# Patient Record
Sex: FEMALE | Race: BLACK OR AFRICAN AMERICAN | NOT HISPANIC OR LATINO | Employment: UNEMPLOYED | URBAN - METROPOLITAN AREA
[De-identification: names, ages, dates, MRNs, and addresses within clinical notes are randomized per-mention and may not be internally consistent; named-entity substitution may affect disease eponyms.]

---

## 2017-01-10 RX ORDER — ERGOCALCIFEROL 1.25 MG/1
50000 CAPSULE ORAL
Qty: 12 CAPSULE | Refills: 1 | Status: SHIPPED | OUTPATIENT
Start: 2017-01-10 | End: 2017-12-11 | Stop reason: SDUPTHER

## 2017-03-28 DIAGNOSIS — I10 ESSENTIAL HYPERTENSION: ICD-10-CM

## 2017-03-28 RX ORDER — ATENOLOL AND CHLORTHALIDONE TABLET 100; 25 MG/1; MG/1
1 TABLET ORAL DAILY
Qty: 90 TABLET | Refills: 1 | Status: SHIPPED | OUTPATIENT
Start: 2017-03-28 | End: 2017-12-11 | Stop reason: SDUPTHER

## 2017-09-07 DIAGNOSIS — I10 ESSENTIAL HYPERTENSION: ICD-10-CM

## 2017-09-07 RX ORDER — BENAZEPRIL HYDROCHLORIDE 20 MG/1
TABLET ORAL
Qty: 90 TABLET | Refills: 1 | Status: SHIPPED | OUTPATIENT
Start: 2017-09-07 | End: 2017-12-11 | Stop reason: SDUPTHER

## 2017-09-20 DIAGNOSIS — Z85.850 HISTORY OF PAPILLARY ADENOCARCINOMA OF THYROID: ICD-10-CM

## 2017-09-20 RX ORDER — LEVOTHYROXINE SODIUM 125 UG/1
TABLET ORAL
Qty: 90 TABLET | Refills: 2 | Status: SHIPPED | OUTPATIENT
Start: 2017-09-20 | End: 2018-07-25

## 2017-09-24 DIAGNOSIS — E78.5 HYPERLIPIDEMIA, UNSPECIFIED HYPERLIPIDEMIA TYPE: ICD-10-CM

## 2017-09-26 RX ORDER — PRAVASTATIN SODIUM 20 MG/1
20 TABLET ORAL DAILY
Qty: 90 TABLET | Refills: 1 | Status: SHIPPED | OUTPATIENT
Start: 2017-09-26 | End: 2019-01-22 | Stop reason: SDUPTHER

## 2017-12-05 RX ORDER — ERGOCALCIFEROL 1.25 MG/1
50000 CAPSULE ORAL
Qty: 12 CAPSULE | Refills: 1 | OUTPATIENT
Start: 2017-12-05

## 2017-12-11 DIAGNOSIS — E11.65 UNCONTROLLED TYPE 2 DIABETES MELLITUS WITH HYPERGLYCEMIA, WITHOUT LONG-TERM CURRENT USE OF INSULIN: ICD-10-CM

## 2017-12-11 DIAGNOSIS — I10 ESSENTIAL HYPERTENSION: ICD-10-CM

## 2017-12-11 NOTE — TELEPHONE ENCOUNTER
----- Message from Evie Singh sent at 12/11/2017  3:05 PM CST -----  Contact: Self [phone]  If needed, pls add orders and schedule labs at main campus on 4/3.   pls mail appointment slip.      ..Refill request.  ergocalciferol (ERGOCALCIFEROL) 50,000 unit Cap     ..  [facility]/pharmacy #[id number] - [address] AT COR[address] OF [address] [address]  Phone: [phone] Fax: [phone]

## 2017-12-11 NOTE — TELEPHONE ENCOUNTER
----- Message from Katrin Summers sent at 12/11/2017  3:17 PM CST -----  Contact: Pt   [phone]   Rd  -   Pt calling to get an refill on medication  atenolol-chlorthalidone  ,   benazepril  ,  metformin  , to last until her next mel .  Call the pt back when filled

## 2017-12-12 RX ORDER — ERGOCALCIFEROL 1.25 MG/1
50000 CAPSULE ORAL
Qty: 12 CAPSULE | Refills: 1 | Status: SHIPPED | OUTPATIENT
Start: 2017-12-12 | End: 2022-06-01

## 2017-12-12 RX ORDER — METFORMIN HYDROCHLORIDE 500 MG/1
1000 TABLET, EXTENDED RELEASE ORAL 2 TIMES DAILY
Qty: 180 TABLET | Refills: 2 | Status: SHIPPED | OUTPATIENT
Start: 2017-12-12 | End: 2018-08-07 | Stop reason: SDUPTHER

## 2017-12-12 RX ORDER — BENAZEPRIL HYDROCHLORIDE 20 MG/1
20 TABLET ORAL DAILY
Qty: 90 TABLET | Refills: 1 | Status: SHIPPED | OUTPATIENT
Start: 2017-12-12 | End: 2019-01-18 | Stop reason: SDUPTHER

## 2017-12-12 RX ORDER — ATENOLOL AND CHLORTHALIDONE TABLET 100; 25 MG/1; MG/1
1 TABLET ORAL DAILY
Qty: 90 TABLET | Refills: 1 | Status: SHIPPED | OUTPATIENT
Start: 2017-12-12 | End: 2018-11-30

## 2018-04-05 ENCOUNTER — TELEPHONE (OUTPATIENT)
Dept: ENDOCRINOLOGY | Facility: CLINIC | Age: 58
End: 2018-04-05

## 2018-04-05 NOTE — TELEPHONE ENCOUNTER
----- Message from Cosmo Dozier sent at 4/5/2018 10:20 AM CDT -----  Contact: self  Pt called in about having rs appt. Pt next appt is 8/7/18. Pt wanted to see if will get medication up until that point. Pt would like the nurse to give her a call back in regards to this matter      Pt can be reached at [phone]    TY

## 2018-07-18 DIAGNOSIS — E78.5 HYPERLIPIDEMIA, UNSPECIFIED HYPERLIPIDEMIA TYPE: ICD-10-CM

## 2018-07-18 RX ORDER — PRAVASTATIN SODIUM 20 MG/1
20 TABLET ORAL DAILY
Qty: 90 TABLET | Refills: 1 | Status: CANCELLED | OUTPATIENT
Start: 2018-07-18

## 2018-07-24 RX ORDER — LEVOTHYROXINE SODIUM 125 UG/1
TABLET ORAL
Qty: 30 TABLET | Refills: 0 | OUTPATIENT
Start: 2018-07-24

## 2018-07-25 DIAGNOSIS — Z85.850 HISTORY OF PAPILLARY ADENOCARCINOMA OF THYROID: ICD-10-CM

## 2018-07-25 RX ORDER — LEVOTHYROXINE SODIUM 125 UG/1
TABLET ORAL
Qty: 90 TABLET | Refills: 1 | Status: SHIPPED | OUTPATIENT
Start: 2018-07-25 | End: 2018-08-07 | Stop reason: SDUPTHER

## 2018-07-25 NOTE — TELEPHONE ENCOUNTER
----- Message from Evie Singh sent at 7/25/2018 11:38 AM CDT -----  Contact: Self [phone]  PT is requesting orders for labs to be done prior to appointment on 8/7.    pls schedule August 1 at [facility].    ..Checking on the status of refill request.  SYNTHROID 125 mcg tablet    ..  [facility]/pharmacy #[id number] - [address] AT CORNER OF [address] [address]  Phone: [phone] Fax: [phone]

## 2018-08-02 ENCOUNTER — TELEPHONE (OUTPATIENT)
Dept: ENDOCRINOLOGY | Facility: CLINIC | Age: 58
End: 2018-08-02

## 2018-08-02 DIAGNOSIS — E03.9 HYPOTHYROIDISM, UNSPECIFIED TYPE: ICD-10-CM

## 2018-08-02 DIAGNOSIS — E11.8 TYPE 2 DIABETES MELLITUS WITH COMPLICATION, UNSPECIFIED WHETHER LONG TERM INSULIN USE: Primary | ICD-10-CM

## 2018-08-02 NOTE — TELEPHONE ENCOUNTER
----- Message from Bishnu Summers sent at 8/1/2018  5:23 PM CDT -----  Contact: Pt  Pt is requesting orders for labs. Has appt with Dr pugh for 08/07/18. Needs labs orderedand lexy prior to dr smith    Pt can be reached at [phone]

## 2018-08-02 NOTE — TELEPHONE ENCOUNTER
Attempted to call pt but pt phone has  voice mail  not set up,  so called another  contact no for Shelly Melchor and left brief voice mail for pt lab appointment set up for tomorrow. Contact no was provided.

## 2018-08-03 ENCOUNTER — APPOINTMENT (OUTPATIENT)
Dept: LAB | Facility: HOSPITAL | Age: 58
End: 2018-08-03
Attending: INTERNAL MEDICINE
Payer: COMMERCIAL

## 2018-08-06 NOTE — PROGRESS NOTES
"Subjective:       Patient ID: Rand Kaye is a 57 y.o. female.    Chief Complaint: Follow-up    HPI     53 y/o F here for follow-up for DM and hx of thyroid cancer.  Last seen by Trenton Stephens and Rd 9/2016     Has T2DM Since 2009. A1c has increased to 10.1% (from 9.3%). Doesn't check glucose at home. Has been noncompliant with meds and diet. Uses sugar, drinking sodas. Uses metformin sparingly. Never filled invokana from last visit.  Is gaining weight. Is a , so lifestyle is sedentary - insulin is last option. Overdue for eye check. Denies peripheral neuropathy.     Last seen by DME: 4/2013    For thyroid cancer, had surgery in 2012 to remove thyroid 2/2 compressive symptoms. Incidentally found to have a 1 cm papillary thyroid cancer in left lobe after surgery. Received 30 mCi iodine after surgery, but did not show for WBS. Started on synthroid, taking 125mcg/day. No hyper or hypothyroid symptoms. Last TSH normal. Thyroglobulin previously 8.8, but most recent 2.7.       Was then noted to have a left neck mass on exam that pt states is unchanged since 2013. Had U/S neck 2/2013:  "Sonographic examination of neck compartments II through V was carried out.  No pathologic lymphadenopathy identified.  In the lateral compartment III there is an asymmetric thickening of the muscle which may represent a mass. There is no indication that this is arising from the lymph node."     then underwent FNA that was "almost acellular, no colloid, unsatisfactory"      Has HTN, compliant with meds including benazepril      Has HLD, on statin     Is obese, BMI 33, admits to not exercising and not following diet     Review of Systems   Constitutional: Negative for unexpected weight change.   Eyes: Negative for visual disturbance.   Respiratory: Negative for shortness of breath.    Cardiovascular: Negative for chest pain.   Gastrointestinal: Negative for abdominal pain.   Genitourinary: Negative for urgency. " "  Musculoskeletal: Negative for arthralgias.   Skin: Negative for wound.   Neurological: Negative for headaches.   Hematological: Does not bruise/bleed easily.   Psychiatric/Behavioral: Negative for sleep disturbance.       Objective:       BP (!) 150/84 (BP Location: Left arm, Patient Position: Sitting, BP Method: Medium (Manual))   Pulse (!) 54   Ht 5' 10" (1.778 m)   Wt 105.7 kg (233 lb 0.4 oz)   LMP 09/18/2013   BMI 33.44 kg/m²     Physical Exam   Constitutional: She appears well-developed and well-nourished. No distress.   HENT:   Head: Normocephalic and atraumatic.   Neck: No thyromegaly present.   +L neck mass ~3-4cm, mobile, non tender, not warm   Cardiovascular: Normal rate.    Pulmonary/Chest: Effort normal and breath sounds normal.   Abdominal: Soft.   Musculoskeletal: She exhibits no edema.   Feet no cuts or scratches  Shoes appropriate    Neurological:   sensation intact to vibration and monofilament    Skin:   No nodules  injection sites are ok. No lipo hypertrophy or atrophy    Vitals reviewed.        Protective Sensation (w/ 10 gram monofilament):  Right: Intact  Left: Intact    Visual Inspection:  Normal -  Bilateral    Pedal Pulses:   Right: Present  Left: Present    Posterior tibialis:   Right:Present  Left: Present        Lab Results   Component Value Date    HGBA1C 10.1 (H) 08/03/2018    HGBA1C 9.3 (H) 09/10/2016    LABMICR 89.0 09/13/2016    MICALBCREAT 94.7 (H) 09/13/2016    CHOL 222 (H) 08/03/2018    CHOL 206 (H) 07/30/2015    TRIG 191 (H) 08/03/2018    TRIG 113 07/30/2015    HDL 66 08/03/2018    HDL 56 07/30/2015    LDLCALC 117.8 08/03/2018    LDLCALC 127.4 07/30/2015         Chemistry        Component Value Date/Time     08/03/2018 1620    K 3.3 (L) 08/03/2018 1620     08/03/2018 1620    CO2 28 08/03/2018 1620    BUN 12 08/03/2018 1620    CREATININE 0.8 08/03/2018 1620     (H) 08/03/2018 1620        Component Value Date/Time    CALCIUM 10.0 08/03/2018 1620    STEPHAN " 59 2018 1620    AST 22 2018 1620    ALT 24 2018 1620    BILITOT 0.3 2018 1620    ESTGFRAFRICA >60.0 2018 1620    EGFRNONAA >60.0 2018 1620          TSH   Date Value Ref Range Status   2018 0.814 0.400 - 4.000 uIU/mL Final     Free T4   Date Value Ref Range Status   2018 1.08 0.71 - 1.51 ng/dL Final     Thyroglobulin Antibody Screen   Date Value Ref Range Status   2018 <1.8 <4.0 IU/mL Final     Thyroglobulin   Date Value Ref Range Status   10/18/2013 6.6 2.0 - 40.0 ng/mL Final     Thyroperoxidase Antibodies   Date Value Ref Range Status   2012 < 6.0 0 - 6 IU/mL Final     T.7 (2018)    Assessment:       1. History of papillary adenocarcinoma of thyroid    2. Uncontrolled type 2 diabetes mellitus with hyperglycemia, without long-term current use of insulin    3. Postoperative hypothyroidism    4. Class 1 obesity due to excess calories with serious comorbidity and body mass index (BMI) of 34.0 to 34.9 in adult    5. Hyperlipidemia, unspecified hyperlipidemia type    6. Essential hypertension        Plan:       History of papillary adenocarcinoma of thyroid  Got 30mCi s/p surgery but never showed for WBS   This left neck mass in concerning, will recommend repeat U/S for comparison since .    May require further imaging to evaluate soft tissue, ie CT neck. Will await US results prior to ordering.  TG improved from previous.      Biochemically and clinically euthyroid, continue LT4 125mcg.      Uncontrolled type 2 diabetes mellitus with hyperglycemia, without long-term current use of insulin  Had extensive discussion about dietary lifestyle changes.  Patient v/u.   Referred to DM education for further education on diet, new medication, and possibility of CGM.  She is motivated to start managing her DM, as she wants to avoid end organ complications.   Discussed increased daily activity to assist with weight loss.   Given a1c and increasing trend, prefer  to start insulin, but patient's occupation prevents that at this time.  To restart metformin (500mg XL BID), start Jardiance (counseled on potential SE of yeast infections, dehydration, and UTI.  Counseled on positive effects of weight loss.).  Will likely benefit from GLP1 RA in near future.  Start checking BS at least 3 times daily, but preferably 4x daily.  Provided BG log. Patient to send in completed sheet for further titration of regimen.    Postoperative hypothyroidism  As above     Obesity due to excess calories, unspecified obesity severity  Counseled on wt loss and exercise   Avoid white carbs, sodas, snacking, late meals  Started SGLT2i, which will assist with weight loss. Recommended compliance with metformin.     Hyperlipidemia, unspecified hyperlipidemia type  Reviewed lipid panel with patient.  Continue statin.    Essential hypertension  Hypertensive at triage, but did not take medications today.  Usually controlled on ACEi.  Continue benazepril 20mg and tenoric.         RTC in 6 months for DM follow up, and again in 1y for thyroid FU  Patient to send in log for further DM regimen adjustment if necessary.      Madai Otero MD  Endocrinology Fellow     This case has been reviewed with staff, Dr. Brooks.

## 2018-08-07 ENCOUNTER — OFFICE VISIT (OUTPATIENT)
Dept: ENDOCRINOLOGY | Facility: CLINIC | Age: 58
End: 2018-08-07
Payer: COMMERCIAL

## 2018-08-07 VITALS
HEIGHT: 70 IN | SYSTOLIC BLOOD PRESSURE: 150 MMHG | BODY MASS INDEX: 33.36 KG/M2 | DIASTOLIC BLOOD PRESSURE: 84 MMHG | WEIGHT: 233 LBS | HEART RATE: 54 BPM

## 2018-08-07 DIAGNOSIS — E78.5 HYPERLIPIDEMIA, UNSPECIFIED HYPERLIPIDEMIA TYPE: ICD-10-CM

## 2018-08-07 DIAGNOSIS — E89.0 POSTOPERATIVE HYPOTHYROIDISM: ICD-10-CM

## 2018-08-07 DIAGNOSIS — E66.09 CLASS 1 OBESITY DUE TO EXCESS CALORIES WITH SERIOUS COMORBIDITY AND BODY MASS INDEX (BMI) OF 34.0 TO 34.9 IN ADULT: ICD-10-CM

## 2018-08-07 DIAGNOSIS — Z85.850 HISTORY OF PAPILLARY ADENOCARCINOMA OF THYROID: Primary | ICD-10-CM

## 2018-08-07 DIAGNOSIS — I10 ESSENTIAL HYPERTENSION: ICD-10-CM

## 2018-08-07 DIAGNOSIS — E11.65 UNCONTROLLED TYPE 2 DIABETES MELLITUS WITH HYPERGLYCEMIA, WITHOUT LONG-TERM CURRENT USE OF INSULIN: ICD-10-CM

## 2018-08-07 PROCEDURE — 99214 OFFICE O/P EST MOD 30 MIN: CPT | Mod: S$GLB,,, | Performed by: INTERNAL MEDICINE

## 2018-08-07 PROCEDURE — 3046F HEMOGLOBIN A1C LEVEL >9.0%: CPT | Mod: CPTII,S$GLB,, | Performed by: INTERNAL MEDICINE

## 2018-08-07 PROCEDURE — 3008F BODY MASS INDEX DOCD: CPT | Mod: CPTII,S$GLB,, | Performed by: INTERNAL MEDICINE

## 2018-08-07 PROCEDURE — 3077F SYST BP >= 140 MM HG: CPT | Mod: CPTII,S$GLB,, | Performed by: INTERNAL MEDICINE

## 2018-08-07 PROCEDURE — 3079F DIAST BP 80-89 MM HG: CPT | Mod: CPTII,S$GLB,, | Performed by: INTERNAL MEDICINE

## 2018-08-07 PROCEDURE — 99999 PR PBB SHADOW E&M-EST. PATIENT-LVL V: CPT | Mod: PBBFAC,,, | Performed by: INTERNAL MEDICINE

## 2018-08-07 RX ORDER — INSULIN PUMP SYRINGE, 3 ML
EACH MISCELLANEOUS
Qty: 1 EACH | Refills: 0 | Status: SHIPPED | OUTPATIENT
Start: 2018-08-07 | End: 2019-08-07

## 2018-08-07 RX ORDER — METFORMIN HYDROCHLORIDE 500 MG/1
1000 TABLET, EXTENDED RELEASE ORAL 2 TIMES DAILY
Qty: 180 TABLET | Refills: 2 | Status: SHIPPED | OUTPATIENT
Start: 2018-08-07 | End: 2023-04-26 | Stop reason: SDUPTHER

## 2018-08-07 RX ORDER — LANCETS
EACH MISCELLANEOUS
Qty: 200 EACH | Refills: 6 | Status: SHIPPED | OUTPATIENT
Start: 2018-08-07

## 2018-08-07 RX ORDER — ERGOCALCIFEROL 1.25 MG/1
50000 CAPSULE ORAL
Qty: 12 CAPSULE | Refills: 1 | Status: CANCELLED | OUTPATIENT
Start: 2018-08-07

## 2018-08-07 RX ORDER — LEVOTHYROXINE SODIUM 125 UG/1
TABLET ORAL
Qty: 90 TABLET | Refills: 1 | Status: SHIPPED | OUTPATIENT
Start: 2018-08-07 | End: 2022-02-25

## 2018-08-07 RX ORDER — BETAMETHASONE DIPROPIONATE 0.5 MG/G
OINTMENT TOPICAL 2 TIMES DAILY
Qty: 45 G | Refills: 0 | Status: SHIPPED | OUTPATIENT
Start: 2018-08-07 | End: 2022-04-27 | Stop reason: SDUPTHER

## 2018-08-07 NOTE — PATIENT INSTRUCTIONS
Vitamin D3 1000 iU daily    Referred to Diabetes education    Started new medication - Jardiance.    Discussed side effects - dehydration, yeast infection, UTIs, etc.     Scheduled US.

## 2018-08-21 ENCOUNTER — CLINICAL SUPPORT (OUTPATIENT)
Dept: DIABETES | Facility: CLINIC | Age: 58
End: 2018-08-21
Payer: COMMERCIAL

## 2018-08-21 ENCOUNTER — HOSPITAL ENCOUNTER (OUTPATIENT)
Dept: RADIOLOGY | Facility: HOSPITAL | Age: 58
Discharge: HOME | End: 2018-08-21
Attending: INTERNAL MEDICINE
Payer: COMMERCIAL

## 2018-08-21 ENCOUNTER — CLINICAL SUPPORT (OUTPATIENT)
Dept: OPTOMETRY | Facility: CLINIC | Age: 58
End: 2018-08-21
Attending: INTERNAL MEDICINE
Payer: COMMERCIAL

## 2018-08-21 DIAGNOSIS — E11.65 UNCONTROLLED TYPE 2 DIABETES MELLITUS WITH HYPERGLYCEMIA, WITHOUT LONG-TERM CURRENT USE OF INSULIN: ICD-10-CM

## 2018-08-21 DIAGNOSIS — Z85.850 HISTORY OF PAPILLARY ADENOCARCINOMA OF THYROID: ICD-10-CM

## 2018-08-21 PROCEDURE — 76536 US EXAM OF HEAD AND NECK: CPT | Mod: 26,,, | Performed by: RADIOLOGY

## 2018-08-21 PROCEDURE — 92250 FUNDUS PHOTOGRAPHY W/I&R: CPT | Mod: S$GLB,,, | Performed by: OPHTHALMOLOGY

## 2018-08-21 PROCEDURE — G0108 DIAB MANAGE TRN  PER INDIV: HCPCS | Mod: S$GLB,,, | Performed by: INTERNAL MEDICINE

## 2018-08-21 PROCEDURE — 76536 US EXAM OF HEAD AND NECK: CPT | Mod: TC

## 2018-08-21 NOTE — PROGRESS NOTES
"Diabetes Education  Author: Traci Cruz RD, CDE  Date: 8/21/2018    Diabetes Education Visit  Diabetes Education Record Assessment/Progress: Initial    Diabetes Type  Diabetes Type : Type II    Diabetes History  Diabetes Diagnosis: >10 years    Diagnosed with type 2 DM ~ 2010. At the time attended a group diabetes education class.      18 [address] .      hasnt been able to afford picking up rx of jardiance yet. Was previously taking metformin 500 mg once a day, has rx to increase this to 500 mg 2 tabs BID.      Until January was sleeping in 18 hinojosa but since January now sleeping at home.      Sedentary lifestyle. Currently drinking "a lot of water." Stopped sugar sweetened beverages last week -- was drinking 2 a day (sweet tea in bottle). Also cut out chips, ice cream.      3 meals/day. bfast is eaten at home (sandwich), lunch she brings leftovers with her and eats both lunch and evening meal on road.      Lives alone. 3 kids, 4 granddaughters     Not SMBG-has not been a priority. has current rx for meter supplies. Goal will be to begin SMBG.    Monitoring   Blood Glucose Logs: No  Do you use a personal glucose monitor?: No  In the last month, how often have you had a low blood sugar reaction?: never  Can you tell when your blood sugar is too high?: no    Exercise   Exercise Type: none    Current Diabetes Treatment   Current Treatment: Oral Medication    Social History  Preferred Learning Method: Face to Face  Primary Support: Self    Barriers to Change  Barriers to Change: None  Learning Challenges : None    Readiness to Learn   Readiness to Learn : Eager    Cultural Influences  Cultural Influences: No    Diabetes Education Assessment/Progress  Diabetes Disease Process (diabetes disease process and treatment options): Discussion, Instructed, Individual Session  Nutrition (Incorporating nutritional management into one's lifestyle): Discussion, Individual Session  Physical Activity (incorporating " physical activity into one's lifestyle): Discussion, Individual Session  Medications (states correct name, dose, onset, peak, duration, side effects & timing of meds): Discussion, Individual Session  Monitoring (monitoring blood glucose/other parameters & using results): Discussion, Individual Session  Acute Complications (preventing, detecting, and treating acute complications): Discussion  Chronic Complications (preventing, detecting, and treating chronic complications): Discussion  Clinical (diabetes, other pertinent medical history, and relevant comorbidities reviewed during visit): Discussion  Cognitive (knowledge of self-management skills, functional health literacy): Discussion  Psychosocial (emotional response to diabetes): Discussion  Diabetes Distress and Support Systems: Discussion  Behavioral (readiness for change, lifestyle practices, self-care behaviors): Discussion    Goals  Patient has selected/evaluated goals during today's session: Yes, selected  Monitoring: Set(SMBG 2x/day )  Start Date: 08/21/18  Target Date: 09/21/18    Diabetes Care Plan/Intervention  Education Plan/Intervention: Individual Follow-Up DSMT    Diabetes Meal Plan  Carbohydrate Per Meal: 30-45g  Carbohydrate Per Snack : 7-15g    Education Units of Time   Time Spent: 60 min    Health Maintenance was reviewed today with patient. Discussed with patient importance of routine eye exams, foot exams/foot care, blood work (i.e.: A1c, microalbumin, and lipid), dental visits, yearly flu vaccine, and pneumonia vaccine as indicated by PCP. Patient verbalized understanding.     Health Maintenance Topics with due status: Not Due       Topic Last Completion Date    Lipid Panel 08/03/2018    Hemoglobin A1c 08/03/2018    Low Dose Statin 08/07/2018    Foot Exam 08/07/2018    Mammogram 08/07/2018     Health Maintenance Due   Topic Date Due    Hepatitis C Screening  1960    Eye Exam  09/22/1969    TETANUS VACCINE  09/22/1977    Pneumococcal  PPSV23 (Medium Risk) (1) 09/22/1977    Colonoscopy  09/22/2009    Influenza Vaccine  08/01/2018

## 2018-08-21 NOTE — PROGRESS NOTES
HPI     Diabetic Eye Exam      Additional comments: Photos              Comments     57 y.o. y/o here for screening for Diabetic Renopathy with non-dilated   fundus photos per Uli Sol Jr, MD            Last edited by Madeleine Harvey MA on 8/21/2018  1:18 PM. (History)            Assessment /Plan     For exam results, see Encounter Report.    Uncontrolled type 2 diabetes mellitus with hyperglycemia, without long-term current use of insulin  -     Diabetic Eye Screening Photo      Patient had small pupils.

## 2018-08-21 NOTE — PROGRESS NOTES
"Diagnosed with type 2 DM ~ 2010. At the time attended a group diabetes education class.     18 [address] .     hasnt been able to afford picking up rx of jardiance yet. Was previously taking metformin 500 mg once a day, has rx to increase this to 500 mg 2 tabs BID.     Until January was sleeping in 18 hinojosa but since January now sleeping at home.     Sedentary lifestyle. Currently drinking "a lot of water." Stopped sugar sweetened beverages last week -- was drinking 2 a day (sweet tea in bottle). Also cut out chips, ice cream.     3 meals/day. bfast is eaten at home (sandwich), lunch she brings leftovers with her and eats both lunch and evening meal on road.     Lives alone. 3 kids, 4 granddaughters    Not SMBG.   "

## 2018-08-22 DIAGNOSIS — R22.1 NECK MASS: ICD-10-CM

## 2018-09-24 ENCOUNTER — HOSPITAL ENCOUNTER (OUTPATIENT)
Dept: RADIOLOGY | Facility: HOSPITAL | Age: 58
Discharge: HOME | End: 2018-09-24
Attending: INTERNAL MEDICINE
Payer: COMMERCIAL

## 2018-09-24 ENCOUNTER — OFFICE VISIT (OUTPATIENT)
Dept: OPTOMETRY | Facility: CLINIC | Age: 58
End: 2018-09-24
Payer: COMMERCIAL

## 2018-09-24 DIAGNOSIS — H25.13 NUCLEAR SCLEROTIC CATARACT OF BOTH EYES: ICD-10-CM

## 2018-09-24 DIAGNOSIS — Z79.84 LONG TERM CURRENT USE OF ORAL HYPOGLYCEMIC DRUG: ICD-10-CM

## 2018-09-24 DIAGNOSIS — R22.1 NECK MASS: ICD-10-CM

## 2018-09-24 DIAGNOSIS — E11.9 TYPE 2 DIABETES MELLITUS WITHOUT RETINOPATHY: Primary | ICD-10-CM

## 2018-09-24 LAB
CREAT SERPL-MCNC: 0.7 MG/DL (ref 0.5–1.4)
SAMPLE: NORMAL

## 2018-09-24 PROCEDURE — 70491 CT SOFT TISSUE NECK W/DYE: CPT | Mod: TC

## 2018-09-24 PROCEDURE — 25500020 PHARM REV CODE 255: Performed by: INTERNAL MEDICINE

## 2018-09-24 PROCEDURE — 92004 COMPRE OPH EXAM NEW PT 1/>: CPT | Mod: S$GLB,,, | Performed by: OPTOMETRIST

## 2018-09-24 PROCEDURE — 70491 CT SOFT TISSUE NECK W/DYE: CPT | Mod: 26,,, | Performed by: RADIOLOGY

## 2018-09-24 PROCEDURE — 99999 PR PBB SHADOW E&M-EST. PATIENT-LVL III: CPT | Mod: PBBFAC,,, | Performed by: OPTOMETRIST

## 2018-09-24 RX ADMIN — IOHEXOL 75 ML: 350 INJECTION, SOLUTION INTRAVENOUS at 03:09

## 2018-09-24 NOTE — PATIENT INSTRUCTIONS
CATARACT    Symptoms and Signs:  A cataract starts out small, and at first has little effect on your vision. You may notice that your vision is blurred a little, like looking through a cloudy piece of glass or viewing an impressionist painting. A cataract may make light from the sun or a lamp seem too bright or glaring. Or you may notice when you drive at night that the oncoming headlights cause more glare than before. Colors may not appear as bright as they once did.  The type of cataract you have will affect exactly which symptoms you experience and how soon they will occur. When a nuclear cataract first develops it can bring about a temporary improvement in your near vision, called second sight. Unfortunately, the improved vision is short-lived and will disappear as the cataract worsens. Meanwhile, a sub-capsular cataract may not produce any symptoms until it's well-developed.    Causes:  No one knows for sure why the eye's lens changes as we age, forming cataracts. Researchers are gradually identifying factors that may cause cataracts - and information that may help to prevent them.  Many studies suggest that exposure to ultraviolet light is associated with cataracts, so eye care practitioners recommend wearing sunglasses and a wide-brimmed hat to lessen your exposure.  Other studies suggest people with diabetes are at risk for developing a cataract.   Some eye care practitioners believe that a diet high in antioxidants, such as beta-carotene (vitamin A), selenium and vitamins C and E, may forestall cataracts.  The most important of these is probably vitamin C; it might be helpful to supplement the diet with an extra Vitamin C tablet.  Meanwhile, eating a lot of salt may increase your risk.  Other risk factors include cigarette smoke, air pollution and heavy alcohol consumption.  We simply recommend that you be careful to use sunglasses and to take Vitamin C.    Treatment:  When symptoms begin to appear, we can  improve your vision for a while using new glasses, strong bifocals, magnification, appropriate lighting or other visual aids.  This is true in your case; your cataract does not impact your vision very much at this time. If you experience any of the symptoms we described you can return at any time. Otherwise it is fine to see you in 1 year.     ==============================================      Diabetes: Meal Planning  You can help keep your blood sugar level in your target range by eating healthy foods. Your healthcare team can help you create a low-fat, nutritious meal plan. Take an active role in your diabetes management by following your meal plan and working with your healthcare team.    Make your meal plan  A meal plan gives guidelines for the types and amounts of food you should eat. The goal is to balance food and insulin (or other diabetes medications) so your blood sugars will be in your target range. Your dietitian will help you make a flexible meal plan that includes many foods that you like.  Watch serving sizes  Your meal plan will group foods by servings. To learn how much a serving is, start by measuring food portions at each meal. Soon youll know what a serving looks like on your plate. Ask your healthcare provider about how to balance servings of different foods.  Eat from all the food groups  The basis of a healthy meal plan is variety (eating lots of different foods). Choose lean meats, fresh fruits and vegetables, whole grains, and low-fat or nonfat dairy products. Eating a wide variety of foods provides the nutrients your body needs. It can also keep you from getting bored with your meal plan.  Learn about carbohydrates, fats, and protein  · Carbohydrates are starches, sugars, and fiber. They are found in many foods, including fruit, bread, pasta, milk, and sweets. Of all the foods you eat, carbohydrates have the most effect on your blood sugar. Your dietitian may teach you about carb  counting, a way to figure out the number of carbohydrates in a meal.  · Fats have the most calories. They also have the most effect on your weight and your risk of heart disease. When you have diabetes, its important to control your weight and protect your heart. Foods that are high in fat include whole milk, cheese, snack foods, and desserts.  · Protein is important for building and repairing muscles and bones. Choose low-fat protein sources, such as fish, egg whites, and skinless chicken.  Reduce liquid sugars  Extra calories from sodas, sports drinks, and fruit drinks make it hard to keep blood sugar in range. Cut as many liquid sugars from your meal plan as you can.  This includes most fruit juices, which are often high in natural or added sugar. Instead, drink plenty of water and other sugar-free beverages.  Eat less fat  If you need to lose weight, try to reduce the amount of fat in your diet. This can also help lower your cholesterol level to keep blood vessels healthier. Cut fat by using only small amounts of liquid oil for cooking. Read food labels carefully to avoid foods with unhealthy trans fats.  Timing your meals  When it comes to blood sugar control, when you eat is as important as what you eat. You may need to eat several small meals spaced evenly throughout the day to stay in your target range. So dont skip breakfast or wait until late in the day to get most of your calories. Doing so can cause your blood sugar to rise too high or fall too low.    © 3114-0618 [employer]. [address] [place] [address], [place] [address]. All rights reserved. This information is not intended as a substitute for professional medical care. Always follow your healthcare professional's instructions.           Diabetes: Shopping for and Preparing Meals  Having diabetes doesnt mean you have to shop in a special aisle or look for special foods. But you will need to make choices. By comparing items and reading food  "labels, you can find the healthiest foods for you and your family.  Comparing items    When you shop, compare items to find the best ones for your needs. Keep these facts in mind:  · No sugar added does not mean a product is sugar-free.  · "Sugar-free" means less than 1/2 gram (g) of sugar per serving.  · Fat free means less than 1/2 g of fat per serving. This does not necessarily mean the product is low in calories.  · Low fat means 3 g fat or less per serving. Reduced fat or less fat means 25% less fat than the regular version. Some of this fat may be saturated or trans fat. And calories per serving may be similar to the regular version.  Making small changes  Dont try to change all of your eating habits at once. Here are some ideas to start with:  · Try fat-free or low-fat cheese, milk, and yogurt. Also try leaner cuts of meat. This will help you cut down on saturated fat.  · Try whole-grain breads, brown rice, and whole-wheat pasta.  · Load up on fresh or frozen vegetables. If you buy canned, choose low-sodium vegetables.  · Avoid processed foods as much as possible. They tend to be low in fiber and high in trans fats and sodium.  · Try tofu, soymilk, or meat substitutes.?They can help you cut cholesterol and saturated fat out of your diet.  Learning to read food labels  To find healthy foods that help you control blood sugar, learn how to read food labels. Look for the Nutrition Facts label on packaged foods. It will tell you how much carbohydrate, sugar, fat, and fiber is in each serving. Then, you can decide whether or not the food fits into your meal plan.  Using the food label  So, once you have the food label, what do you do with it? The food label helps in many ways. Use it to:  · Compare items and decide which is the best for your health needs.  · Track the number of carbohydrates in your portions.  · Figure out how many servings of a food you can have and still stay within the number of " carbohydrates for that meal.  Planning meals  For good blood sugar control, plan what and when youll eat. Start by creating a meal plan that includes all the food groups. Then, time your meals to help keep your blood sugar level steady. You may need to adjust your plan for special situations.  Eat from all the food groups  The basis of a healthy meal plan is variety (eating many different types of foods). Look for lean meats, fresh fruits and vegetables, whole grains, and low-fat or non-fat dairy products. Eating a wide variety of foods provides the nutrients your body needs. It can also keep you from getting bored with your meal plan.  Reduce liquid sugars  Extra calories from sodas, sports drinks, and fruit drinks make it hard to keep blood sugar in range. Cut as many liquid sugars from your meal plan as you can. This includes most fruit juices, which are often high in natural or added sugar. Instead, drink plenty of water and other sugar-free beverages.  Eat less fat  If you need to lose some weight, try to reduce the amount of fat in your diet. This can also help lower your cholesterol level to keep blood vessels healthier. Cut fat by using only small amounts of liquid oil for cooking. Read food labels carefully to avoid foods with unhealthy trans fats.  Timing your meals  When it comes to blood sugar control, when you eat is as important as what you eat. You may need to eat several small meals spaced evenly throughout the day to stay in your target range. So dont skip breakfast or wait until late in the day to get most of your calories. Doing so can cause your blood sugar to rise too high or fall too low.  Cooking wisely      Aim for meals that include foods from all the food groups.     · Broil, steam, bake, or grill meats and vegetables, instead of frying.  · Instead of cream-based sauces or sugary glazes, flavor foods with vegetable purée, lemon or lime juice, or herb seasonings.  · Remove skin from  chicken and turkey before serving.  · Look in cookbooks for easy, low-fat, low-sugar recipes. When making your usual recipes, cut sugar by 1/2 and fat by 1/3.  © 6095-1160 [employer]. [address] [place] [address], [place] [address]. All rights reserved. This information is not intended as a substitute for professional medical care. Always follow your healthcare professional's instructions.    ==============================================          Diabetes: The Benefits of Exercise  Exercise can lower blood sugar, help control weight, and boost your mood. It can also improve blood flow, lower blood pressure, and improve heart health. Even a small amount of regular activity can have a big impact on your health.      Take the stairs whenever you can.      What can exercise help?  · Blood sugar. Regular exercise improves blood sugar control by helping your body use insulin.  · Mental and emotional health. Physical activity relieves stress and helps you sleep better.  · Heart health. With regular exercise, you can reduce your risk of heart disease and high blood pressure. You can also improve your cholesterol and triglyceride levels.  · Weight. Exercise helps you lose fat, gain muscle, and control your weight.  · Health of blood vessels and nerves. Activity helps lower blood sugar. This helps prevent damage to blood vessels and nerves that can cause problems with your brain, eyes, feet, and legs.  · Finances. If you manage your blood sugar, you may spend less on medical care.  2 types of exercise  Two types of exercise help your body use blood sugar. Experts advise both types of exercise for people with diabetes:  · Aerobic exercise. This is a rhythmic, repeated, continued movement of large muscle groups for at least 10 minutes at a time. Examples include walking, bicycling, jogging, swimming, water aerobics, and many sports.  · Resistance exercise (strength training). This type of exercise uses muscles to move  weight or work against resistance. You can do it with free weights, machines, resistance tubing, or your own body weight.  A goal to shoot for  Your main goal is to become more active. Even a little bit helps. Choose an activity that you like. Walking is one great form of exercise that everyone can do. Talk to your doctor about any limits you may have before starting with an exercise program. Then aim for 40 minutes of moderate to high intensity physical activity on at least 3 to 4 days each week.   Getting activity into your day  Being more active doesnt have to be hard work. Try these to get more activity into your day:  · Take the stairs instead of the elevator  · Garden, do housework, and yard work  · Choose a parking space farther from the store  · Walk to talk to a co-worker instead of calling  · Take a 10-minute walk around the block at lunch  · Walk to a bus stop a little farther from your home or office  · Walk the dog after dinner  © 6201-1279 [employer]. [address] [place] [address]. All rights reserved. This information is not intended as a substitute for professional medical care. Always follow your healthcare professional's instructions.           Diabetes: Getting Started with Exercise  Getting started is easier than you think. Simple and small movements can get you started on a regular exercise routine. You dont need to join a gym to start moving. Choose an activity you enjoy. Start slowly and set small goals. Work activity into your daily life. Talk to your health care provider before starting an activity program. You may need to have a checkup before you begin.    Start with Movement  If youre not used to being active, start with gentle movements while you watch TV. Raise your arms and legs while seated. Then repeat for 5 to 10 minutes. With time, add some slow walking. Even taking a flight of stairs instead of the elevator can lift you to healthier heights. These  types of brief activities are great ways to get started. They can help lower your blood sugar level, strengthen your heart, and improve your energy.  Steps Toward Being More Active  Your goal, especially at first, is to keep your activity simple. Slowly work up to 30 minutes of activity a day. But you dont need to do it all at once. You can be active in 3, 10-minute sessions a day. You can also combine being active with the other things you need to do. For instance, stand up from your desk and walk around often when at work. Or, go for a walk around the mall before you shop.  Keep Your Activity Simple  Why make activity hard on yourself? Choose things that you like to do and that fit into your schedule. Here are some tips:  · Get off the bus a stop or 2 early and walk the rest of the way.  · Run small shopping errands on your bike.  · Go for a 10-minute walk after each meal.  · Park your car in the space farthest from where youre going.  · Get a pedometer that records the number of steps you take. Make a goal for the number of steps you take each day. Increase your goal a little each week.  Keep Your Activity Safe  · Be sure to warm up before you start and cool down when youre done.  · Carry or wear identification that says that you have diabetes.  · Eat 1 to 2 hours before you exercise, if instructed.  · Check your blood sugar before and after you exercise, if instructed. Check your blood sugar if you feel symptoms.  · Carry fast-acting sugar with you in case you have low blood sugar.  · Wear socks and well-fitting shoes.  · Think about the weather in your area. At times, you may need to choose indoor rather than outdoor activities.  Make Your Activity Fun  Mix fitness with fun. The more fun you have, the more likely you are to stick to your plan. You can have a better blood sugar level along with an active, fun day. Try these hints:  · Choose an exercise that you enjoy and can do easily.  · Join a social club  that goes for walks or does other physical activities.  · Go bird watching or do something else that gets you outdoors.  · Put on some music and dance.  · Involve your family or friends in your physical activity.  © 4601-7087 [employer]. [address] [place] [address], [place] [address]. All rights reserved. This information is not intended as a substitute for professional medical care. Always follow your healthcare professional's instructions.             Diabetes: Activity Tips  Being more active can help you manage your diabetes. The tips on this sheet can help you get the most from your exercise. They can also help you stay safe.    Benefit from Briskness  Brisk activity gets your heart beating faster. This can help you increase your fitness, lose extra weight, and manage your blood sugar level. Try brisk walking. Or, if you have foot or leg problems, you can try swimming or bike riding. You can break up your exercise into chunks throughout the day. Work up to at least 30 minutes of steady, brisk exercise on most days.  Warm Up and Cool Down  Warming up and cooling down reduce your risk of injury. They also help limit muscle soreness. Do a mild version of your activity for 5 minutes before and after your routine. You can also learn stretches that will help keep your muscles loose. Your health care provider may show you good ways to warm up and stretch.  Do the Talk-Sing Test  The talk-sing test is a simple way to tell how hard youre exercising. If you can talk while exercising, youre in a safe range. If youre out of breath, slow down. If you can carry a tune, its time to  the pace. Walk up a hill. Increase the resistance on your stationary bike. Or swim faster.  What About Eating?  You may be told to plan your exercise for 1 to 2 hours after a meal. In most cases, you dont need to eat while being active. If you take insulin or medication that can cause low blood sugar, test your blood sugar  before exercising. And carry a fast-acting sugar that will raise your blood sugar level quickly. This includes glucose tablets or hard candy. Use it if you feel low blood sugar symptoms.  Safety Tips  These tips can help you stay safe as you become fit:  · Exercise with a friend or carry a cell phone if you have one.  · Carry or wear identification that says you have diabetes.  · Use the proper footwear and safety equipment for your activity.  · Drink water before, during, and after exercise.  · Dress properly for the weather.  · Dont exercise in very hot or very cold weather.  · Dont exercise if you are sick.  · If you are instructed to do so, test your blood sugar before and after you exercise.  When to Stop Exercising and Call Your Doctor  Stop exercising and call your doctor right away if you notice any of the following:  · Pain, pressure, tightness, or heaviness in the chest  · Pain or heaviness in the neck, shoulders, back, arms, legs, or feet  · Unusual shortness of breath  · Dizziness or lightheadedness  · Unusually rapid or slow pulse  · Increased joint or muscle pain  · Nausea or vomiting  © 6732-5829 [employer]. [address] [place] [address], [place] [address]. All rights reserved. This information is not intended as a substitute for professional medical care. Always follow your healthcare professional's instructions.

## 2018-09-25 ENCOUNTER — TELEPHONE (OUTPATIENT)
Dept: ENDOCRINOLOGY | Facility: CLINIC | Age: 58
End: 2018-09-25

## 2018-09-25 NOTE — PROGRESS NOTES
HPI     Ms. Rand Kaye is here for a diabetic eye exam.  Diabetic eye screening photos 08/21/18 showed no retinopathy OD, but exam   recommended due to blurry images OS.     No visual/ocular concerns.   Patient states she is concerned about her overall ocular health because   she drives trucks professionally.    Would patient like a refraction today? No pt states if she needs a   refraction she will return in the future.    (-)drops   (-)flashes  (-)floaters  (-)diplopia    Diabetic yes  Hemoglobin A1C       Date                     Value               Ref Range             Status           08/03/2018               10.1 (H)            4.0 - 5.6 %         Final  09/10/2016               9.3 (H)             4.5 - 6.2 %         Final  07/30/2015               8.7 (H)             4.5 - 6.2 %         Final    OCULAR HISTORY  Last Eye Exam: 2006   (-)eye surgery   (-)diagnosed or treated for any eye conditions or diseases none     FAMILY HISTORY  (-)Glaucoma none         Last edited by Luba Langford, OD on 9/25/2018  1:05 PM. (History)            Assessment /Plan     For exam results, see Encounter Report.    Type 2 diabetes mellitus without retinopathy  Long term current use of oral hypoglycemic drug   No retinopathy noted OU. Monitor with yearly DFE.     Nuclear sclerotic cataract of both eyes   Not visually significant OU. Monitor.          RTC 1 year

## 2018-09-25 NOTE — TELEPHONE ENCOUNTER
Called patient to discuss US and CT results of neck. Confirmed lipoma.  Patient notes that it is not problematic at this time and she is not interested in extraction.    Will continue surveillance with yearly US.  If exponential/significant growth, will refer to dermatology.    Otherwise, continue with rest of plan of care.   TG improved from previous.      Biochemically and clinically euthyroid, continue LT4 125mcg.  Repeat US and Tg in one year.  Follow up at that time.     Madai Otero MD  Endocrinology Fellow

## 2018-10-04 NOTE — PROGRESS NOTES
I, Mary Brooks, have personally taken the history and physical exam and I agree with Dr. Otero's assessment and plan.

## 2018-11-30 ENCOUNTER — NURSE TRIAGE (OUTPATIENT)
Dept: ADMINISTRATIVE | Facility: CLINIC | Age: 58
End: 2018-11-30

## 2018-11-30 DIAGNOSIS — I10 ESSENTIAL HYPERTENSION: ICD-10-CM

## 2018-11-30 RX ORDER — ATENOLOL AND CHLORTHALIDONE TABLET 100; 25 MG/1; MG/1
TABLET ORAL
Qty: 90 TABLET | Refills: 1 | Status: CANCELLED | OUTPATIENT
Start: 2018-11-30

## 2018-11-30 RX ORDER — ATENOLOL AND CHLORTHALIDONE TABLET 100; 25 MG/1; MG/1
1 TABLET ORAL DAILY
Qty: 7 TABLET | Refills: 0 | Status: SHIPPED | OUTPATIENT
Start: 2018-11-30 | End: 2018-12-03 | Stop reason: SDUPTHER

## 2018-12-01 NOTE — TELEPHONE ENCOUNTER
Reason for Disposition   Caller has URGENT medication question about med that PCP prescribed and triager unable to answer question    Protocols used: ST MEDICATION QUESTION CALL-A-  pt called re maria - going out of town x 7 days. Needs atenolol   Spoke with dr gregory villaseñor for 7 day supply.   [facility]  LM on pharm VM at 711pm. Pt notified. Call back with questions

## 2018-12-03 DIAGNOSIS — I10 ESSENTIAL HYPERTENSION: ICD-10-CM

## 2018-12-03 RX ORDER — ATENOLOL AND CHLORTHALIDONE TABLET 100; 25 MG/1; MG/1
1 TABLET ORAL DAILY
Qty: 30 TABLET | Refills: 0 | Status: SHIPPED | OUTPATIENT
Start: 2018-12-03 | End: 2019-01-18 | Stop reason: SDUPTHER

## 2018-12-03 NOTE — TELEPHONE ENCOUNTER
----- Message from Natasha Leone MA sent at 12/3/2018 12:10 PM CST -----  Contact: Self [phone]      ----- Message -----  From: Evie Singh  Sent: 12/3/2018  11:30 AM  To: Shanna Aj Staff    ..Refill request.  atenolol-chlorthalidone (TENORETIC) 100-25 mg per tablet - previous prescription was called in for 7 tablets.    ..  [facility] #[id number] - [address] AT [address] [address]  Phone: [phone] Fax: [phone]

## 2018-12-07 ENCOUNTER — TELEPHONE (OUTPATIENT)
Dept: ENDOCRINOLOGY | Facility: CLINIC | Age: 58
End: 2018-12-07

## 2018-12-07 DIAGNOSIS — I10 ESSENTIAL HYPERTENSION: ICD-10-CM

## 2018-12-07 RX ORDER — ATENOLOL AND CHLORTHALIDONE TABLET 100; 25 MG/1; MG/1
1 TABLET ORAL DAILY
Qty: 30 TABLET | Refills: 0 | Status: CANCELLED | OUTPATIENT
Start: 2018-12-07

## 2018-12-07 NOTE — TELEPHONE ENCOUNTER
[facility] to [facility]ino for [facility] she put in pt refilled for tenorectic she stated that she will put refilled in.

## 2018-12-07 NOTE — TELEPHONE ENCOUNTER
Talk to chino from [facility] about patient medication  ternoretic to refilled it. Abdirashid said she will put in the refill.

## 2018-12-07 NOTE — TELEPHONE ENCOUNTER
----- Message from Julieta Fela sent at 12/7/2018  1:55 PM CST -----  Contact: Rand   tel:   [phone]   [facility] [place], [place]  .  Pt. Says she is out of medication.   Says she called about this on Monday and the pharmacy has not received the order from you for the Atenolol.   Pt.says she cannot afford the 3 mos. Supply and asks that you order a 30-day supply.    Asking for a return call today about this.

## 2018-12-07 NOTE — TELEPHONE ENCOUNTER
----- Message from Julieta Fela sent at 12/7/2018  1:55 PM CST -----  Contact: Rand   tel:   [phone]   [facility] [place], [place]  .  Pt. Says she is out of medication.   Says she called about this on Monday and the pharmacy has not received the order from you for the Atenolol.   Pt.says she cannot afford the 3 mos. Supply and asks that you order a 30-day supply.    Asking for a return call today about this.

## 2018-12-07 NOTE — TELEPHONE ENCOUNTER
Attempted to call pt in regards to her Rx request.   Pt did not answer and does not have a voicemail set up so I was not able to leave a message.

## 2019-01-04 DIAGNOSIS — I10 ESSENTIAL HYPERTENSION: ICD-10-CM

## 2019-01-09 RX ORDER — ATENOLOL AND CHLORTHALIDONE TABLET 100; 25 MG/1; MG/1
TABLET ORAL
Qty: 30 TABLET | Refills: 0 | OUTPATIENT
Start: 2019-01-09

## 2019-01-18 ENCOUNTER — LAB VISIT (OUTPATIENT)
Dept: LAB | Facility: HOSPITAL | Age: 59
End: 2019-01-18
Payer: COMMERCIAL

## 2019-01-18 DIAGNOSIS — I10 ESSENTIAL HYPERTENSION: ICD-10-CM

## 2019-01-18 LAB
ALBUMIN SERPL BCP-MCNC: 3.7 G/DL
ANION GAP SERPL CALC-SCNC: 11 MMOL/L
BUN SERPL-MCNC: 15 MG/DL
CALCIUM SERPL-MCNC: 10.5 MG/DL
CHLORIDE SERPL-SCNC: 101 MMOL/L
CO2 SERPL-SCNC: 28 MMOL/L
CREAT SERPL-MCNC: 0.9 MG/DL
EST. GFR  (AFRICAN AMERICAN): >60 ML/MIN/1.73 M^2
EST. GFR  (NON AFRICAN AMERICAN): >60 ML/MIN/1.73 M^2
GLUCOSE SERPL-MCNC: 225 MG/DL
PHOSPHATE SERPL-MCNC: 3.1 MG/DL
POTASSIUM SERPL-SCNC: 3.7 MMOL/L
SODIUM SERPL-SCNC: 140 MMOL/L

## 2019-01-18 PROCEDURE — 36415 COLL VENOUS BLD VENIPUNCTURE: CPT

## 2019-01-18 PROCEDURE — 80069 RENAL FUNCTION PANEL: CPT

## 2019-01-18 NOTE — TELEPHONE ENCOUNTER
Talk to pt I explained to her that the doctor can't refilled her request medication until she draw blood work for her kidney. Pt agreed will do blood work tonight across the street.

## 2019-01-18 NOTE — TELEPHONE ENCOUNTER
----- Message from Cindy Verduzco sent at 1/18/2019  8:12 AM CST -----  Contact: pt  Can the clinic reply in [facility]: no      Please refill the medication(s) listed below. The patient can be reached at this phone number  once it is called into the pharmacy. [phone]      Medication #1 benazepril (LOTENSIN) 20 MG tablet      Medication #2pravastatin (PRAVACHOL) 20 MG tablet    Medication #3 atenolol-chlorthalidone (TENORETIC) 100-25 mg per tablet    Medication #4 betamethasone dipropionate (DIPROLENE) 0.05 % ointment      Preferred Pharmacy: [facility]/pharmacy #[id number] - [address] AT [facility] [phone] (Phone)  [phone] (Fax)

## 2019-01-21 RX ORDER — ATENOLOL AND CHLORTHALIDONE TABLET 100; 25 MG/1; MG/1
TABLET ORAL
Qty: 30 TABLET | Refills: 0 | Status: SHIPPED | OUTPATIENT
Start: 2019-01-21 | End: 2023-12-26 | Stop reason: SDUPTHER

## 2019-01-22 ENCOUNTER — TELEPHONE (OUTPATIENT)
Dept: ENDOCRINOLOGY | Facility: CLINIC | Age: 59
End: 2019-01-22

## 2019-01-22 DIAGNOSIS — E78.5 HYPERLIPIDEMIA, UNSPECIFIED HYPERLIPIDEMIA TYPE: ICD-10-CM

## 2019-01-22 DIAGNOSIS — I10 ESSENTIAL HYPERTENSION: ICD-10-CM

## 2019-01-22 RX ORDER — BENAZEPRIL HYDROCHLORIDE 20 MG/1
20 TABLET ORAL DAILY
Qty: 90 TABLET | Refills: 1 | Status: SHIPPED | OUTPATIENT
Start: 2019-01-22 | End: 2022-04-27 | Stop reason: SDUPTHER

## 2019-01-22 RX ORDER — ATENOLOL AND CHLORTHALIDONE TABLET 100; 25 MG/1; MG/1
1 TABLET ORAL DAILY
Qty: 30 TABLET | Refills: 11 | OUTPATIENT
Start: 2019-01-22

## 2019-01-22 RX ORDER — PRAVASTATIN SODIUM 20 MG/1
20 TABLET ORAL DAILY
Qty: 90 TABLET | Refills: 1 | Status: SHIPPED | OUTPATIENT
Start: 2019-01-22 | End: 2022-04-27

## 2019-01-22 RX ORDER — BETAMETHASONE DIPROPIONATE 0.5 MG/G
OINTMENT TOPICAL 2 TIMES DAILY
Qty: 45 G | Refills: 0 | OUTPATIENT
Start: 2019-01-22 | End: 2019-02-01

## 2019-01-22 NOTE — TELEPHONE ENCOUNTER
Refilled - tenoric and statin.  Refused steroid cream refill.    Sent patient message on update.  Future refills for steroid cream and anti HTN meds to be filled by PCP in future.    Madai Otero MD  Endocrinology Fellow

## 2019-01-22 NOTE — TELEPHONE ENCOUNTER
----- Message from Alena Rojas sent at 1/22/2019 10:46 AM CST -----  Contact: Self  .Rx Refill/Request     Is this a Refill or New Rx:  Refill  Rx Name and Strength:  pravastatin (PRAVACHOL) 20 MG tablet, betamethasone dipropionate (DIPROLENE) 0.05 % ointment  Preferred Pharmacy with phone number: [facility]/pharmacy, [phone] Fax: [phone]  Communication Preference:   Additional Information:

## 2019-02-18 DIAGNOSIS — I10 ESSENTIAL HYPERTENSION: ICD-10-CM

## 2019-02-18 RX ORDER — ATENOLOL AND CHLORTHALIDONE TABLET 100; 25 MG/1; MG/1
TABLET ORAL
Qty: 30 TABLET | Refills: 0 | OUTPATIENT
Start: 2019-02-18

## 2019-03-08 DIAGNOSIS — I10 ESSENTIAL HYPERTENSION: ICD-10-CM

## 2019-03-08 RX ORDER — ATENOLOL AND CHLORTHALIDONE TABLET 100; 25 MG/1; MG/1
TABLET ORAL
Qty: 30 TABLET | Refills: 0 | OUTPATIENT
Start: 2019-03-08

## 2019-03-08 NOTE — TELEPHONE ENCOUNTER
----- Message from Alena Rojas sent at 3/8/2019  9:58 AM CST -----  Contact: Self  .Rx Refill/Request     Is this a Refill or New Rx:  Refill  Rx Name and Strength:  atenolol-chlorthalidone (TENORETIC) 100-25 mg per tablet  Preferred Pharmacy with phone number: [facility]/pharmacy #[id number] , [phone] Fax: [phone]   Communication Preference:  [phone]  Additional Information:

## 2019-03-08 NOTE — TELEPHONE ENCOUNTER
Talk to pt she wanted to know the reason why we have not refilled her medication. I explained to her dr valdovinos had a discuss with pt. she wouldn't refill medication she wants patient to follow up with her pcp for any refills. Pt understand, verbally said ok.

## 2019-03-08 NOTE — TELEPHONE ENCOUNTER
----- Message from Natasha Leone MA sent at 3/8/2019 11:27 AM CST -----  Contact: Self [phone]      ----- Message -----  From: Evie Singh  Sent: 3/8/2019  11:18 AM  To: Shanna Aj Staff    PT called to check status of refill status of atenolol-chlorthalidone (TENORETIC) 100-25 mg per tablet.   PT can be reached at [phone].

## 2022-02-18 ENCOUNTER — LAB VISIT (OUTPATIENT)
Dept: LAB | Facility: HOSPITAL | Age: 62
End: 2022-02-18
Payer: COMMERCIAL

## 2022-02-18 ENCOUNTER — OFFICE VISIT (OUTPATIENT)
Dept: ENDOCRINOLOGY | Facility: CLINIC | Age: 62
End: 2022-02-18
Payer: COMMERCIAL

## 2022-02-18 VITALS
HEART RATE: 56 BPM | OXYGEN SATURATION: 99 % | SYSTOLIC BLOOD PRESSURE: 134 MMHG | HEIGHT: 70 IN | WEIGHT: 222 LBS | BODY MASS INDEX: 31.78 KG/M2 | RESPIRATION RATE: 18 BRPM | DIASTOLIC BLOOD PRESSURE: 82 MMHG

## 2022-02-18 DIAGNOSIS — E11.65 UNCONTROLLED TYPE 2 DIABETES MELLITUS WITH HYPERGLYCEMIA, WITHOUT LONG-TERM CURRENT USE OF INSULIN: ICD-10-CM

## 2022-02-18 DIAGNOSIS — Z85.850 HISTORY OF PAPILLARY ADENOCARCINOMA OF THYROID: ICD-10-CM

## 2022-02-18 DIAGNOSIS — E89.0 POSTOPERATIVE HYPOTHYROIDISM: ICD-10-CM

## 2022-02-18 DIAGNOSIS — E11.65 UNCONTROLLED TYPE 2 DIABETES MELLITUS WITH HYPERGLYCEMIA, WITHOUT LONG-TERM CURRENT USE OF INSULIN: Primary | ICD-10-CM

## 2022-02-18 LAB
ALBUMIN SERPL BCP-MCNC: 4 G/DL (ref 3.5–5.2)
ALP SERPL-CCNC: 61 U/L (ref 55–135)
ALT SERPL W/O P-5'-P-CCNC: 21 U/L (ref 10–44)
ANION GAP SERPL CALC-SCNC: 10 MMOL/L (ref 8–16)
AST SERPL-CCNC: 18 U/L (ref 10–40)
BASOPHILS # BLD AUTO: 0.07 K/UL (ref 0–0.2)
BASOPHILS NFR BLD: 1.3 % (ref 0–1.9)
BILIRUB SERPL-MCNC: 0.5 MG/DL (ref 0.1–1)
BUN SERPL-MCNC: 10 MG/DL (ref 8–23)
CALCIUM SERPL-MCNC: 10.5 MG/DL (ref 8.7–10.5)
CHLORIDE SERPL-SCNC: 101 MMOL/L (ref 95–110)
CHOLEST SERPL-MCNC: 250 MG/DL (ref 120–199)
CHOLEST/HDLC SERPL: 3.4 {RATIO} (ref 2–5)
CO2 SERPL-SCNC: 31 MMOL/L (ref 23–29)
CREAT SERPL-MCNC: 0.7 MG/DL (ref 0.5–1.4)
DIFFERENTIAL METHOD: NORMAL
EOSINOPHIL # BLD AUTO: 0.2 K/UL (ref 0–0.5)
EOSINOPHIL NFR BLD: 3.9 % (ref 0–8)
ERYTHROCYTE [DISTWIDTH] IN BLOOD BY AUTOMATED COUNT: 12.4 % (ref 11.5–14.5)
EST. GFR  (AFRICAN AMERICAN): >60 ML/MIN/1.73 M^2
EST. GFR  (NON AFRICAN AMERICAN): >60 ML/MIN/1.73 M^2
ESTIMATED AVG GLUCOSE: 214 MG/DL (ref 68–131)
GLUCOSE SERPL-MCNC: 194 MG/DL (ref 70–110)
HBA1C MFR BLD: 9.1 % (ref 4–5.6)
HCT VFR BLD AUTO: 40.5 % (ref 37–48.5)
HDLC SERPL-MCNC: 73 MG/DL (ref 40–75)
HDLC SERPL: 29.2 % (ref 20–50)
HGB BLD-MCNC: 13.2 G/DL (ref 12–16)
IMM GRANULOCYTES # BLD AUTO: 0.01 K/UL (ref 0–0.04)
IMM GRANULOCYTES NFR BLD AUTO: 0.2 % (ref 0–0.5)
LDLC SERPL CALC-MCNC: 145.6 MG/DL (ref 63–159)
LYMPHOCYTES # BLD AUTO: 1.9 K/UL (ref 1–4.8)
LYMPHOCYTES NFR BLD: 34.1 % (ref 18–48)
MCH RBC QN AUTO: 28.9 PG (ref 27–31)
MCHC RBC AUTO-ENTMCNC: 32.6 G/DL (ref 32–36)
MCV RBC AUTO: 89 FL (ref 82–98)
MONOCYTES # BLD AUTO: 0.5 K/UL (ref 0.3–1)
MONOCYTES NFR BLD: 8.1 % (ref 4–15)
NEUTROPHILS # BLD AUTO: 2.9 K/UL (ref 1.8–7.7)
NEUTROPHILS NFR BLD: 52.4 % (ref 38–73)
NONHDLC SERPL-MCNC: 177 MG/DL
NRBC BLD-RTO: 0 /100 WBC
PLATELET # BLD AUTO: 246 K/UL (ref 150–450)
PMV BLD AUTO: 11.3 FL (ref 9.2–12.9)
POTASSIUM SERPL-SCNC: 3.9 MMOL/L (ref 3.5–5.1)
PROT SERPL-MCNC: 7.6 G/DL (ref 6–8.4)
RBC # BLD AUTO: 4.56 M/UL (ref 4–5.4)
SODIUM SERPL-SCNC: 142 MMOL/L (ref 136–145)
T4 FREE SERPL-MCNC: 1.2 NG/DL (ref 0.71–1.51)
TRIGL SERPL-MCNC: 157 MG/DL (ref 30–150)
TSH SERPL DL<=0.005 MIU/L-ACNC: 0.15 UIU/ML (ref 0.4–4)
WBC # BLD AUTO: 5.58 K/UL (ref 3.9–12.7)

## 2022-02-18 PROCEDURE — 3046F HEMOGLOBIN A1C LEVEL >9.0%: CPT | Mod: CPTII,S$GLB,, | Performed by: INTERNAL MEDICINE

## 2022-02-18 PROCEDURE — 3066F NEPHROPATHY DOC TX: CPT | Mod: CPTII,S$GLB,, | Performed by: INTERNAL MEDICINE

## 2022-02-18 PROCEDURE — 99999 PR PBB SHADOW E&M-EST. PATIENT-LVL V: ICD-10-PCS | Mod: PBBFAC,,, | Performed by: INTERNAL MEDICINE

## 2022-02-18 PROCEDURE — 1159F MED LIST DOCD IN RCRD: CPT | Mod: CPTII,S$GLB,, | Performed by: INTERNAL MEDICINE

## 2022-02-18 PROCEDURE — 3008F BODY MASS INDEX DOCD: CPT | Mod: CPTII,S$GLB,, | Performed by: INTERNAL MEDICINE

## 2022-02-18 PROCEDURE — 3046F PR MOST RECENT HEMOGLOBIN A1C LEVEL > 9.0%: ICD-10-PCS | Mod: CPTII,S$GLB,, | Performed by: INTERNAL MEDICINE

## 2022-02-18 PROCEDURE — 1159F PR MEDICATION LIST DOCUMENTED IN MEDICAL RECORD: ICD-10-PCS | Mod: CPTII,S$GLB,, | Performed by: INTERNAL MEDICINE

## 2022-02-18 PROCEDURE — 80053 COMPREHEN METABOLIC PANEL: CPT | Performed by: INTERNAL MEDICINE

## 2022-02-18 PROCEDURE — 3062F POS MACROALBUMINURIA REV: CPT | Mod: CPTII,S$GLB,, | Performed by: INTERNAL MEDICINE

## 2022-02-18 PROCEDURE — 99204 OFFICE O/P NEW MOD 45 MIN: CPT | Mod: S$GLB,,, | Performed by: INTERNAL MEDICINE

## 2022-02-18 PROCEDURE — 83036 HEMOGLOBIN GLYCOSYLATED A1C: CPT | Performed by: INTERNAL MEDICINE

## 2022-02-18 PROCEDURE — 3079F DIAST BP 80-89 MM HG: CPT | Mod: CPTII,S$GLB,, | Performed by: INTERNAL MEDICINE

## 2022-02-18 PROCEDURE — 1160F RVW MEDS BY RX/DR IN RCRD: CPT | Mod: CPTII,S$GLB,, | Performed by: INTERNAL MEDICINE

## 2022-02-18 PROCEDURE — 3062F PR POS MACROALBUMINURIA RESULT DOCUMENTED/REVIEW: ICD-10-PCS | Mod: CPTII,S$GLB,, | Performed by: INTERNAL MEDICINE

## 2022-02-18 PROCEDURE — 84432 ASSAY OF THYROGLOBULIN: CPT | Performed by: INTERNAL MEDICINE

## 2022-02-18 PROCEDURE — 99204 PR OFFICE/OUTPT VISIT, NEW, LEVL IV, 45-59 MIN: ICD-10-PCS | Mod: S$GLB,,, | Performed by: INTERNAL MEDICINE

## 2022-02-18 PROCEDURE — 84439 ASSAY OF FREE THYROXINE: CPT | Performed by: INTERNAL MEDICINE

## 2022-02-18 PROCEDURE — 36415 COLL VENOUS BLD VENIPUNCTURE: CPT | Performed by: INTERNAL MEDICINE

## 2022-02-18 PROCEDURE — 3075F SYST BP GE 130 - 139MM HG: CPT | Mod: CPTII,S$GLB,, | Performed by: INTERNAL MEDICINE

## 2022-02-18 PROCEDURE — 3008F PR BODY MASS INDEX (BMI) DOCUMENTED: ICD-10-PCS | Mod: CPTII,S$GLB,, | Performed by: INTERNAL MEDICINE

## 2022-02-18 PROCEDURE — 99999 PR PBB SHADOW E&M-EST. PATIENT-LVL V: CPT | Mod: PBBFAC,,, | Performed by: INTERNAL MEDICINE

## 2022-02-18 PROCEDURE — 1160F PR REVIEW ALL MEDS BY PRESCRIBER/CLIN PHARMACIST DOCUMENTED: ICD-10-PCS | Mod: CPTII,S$GLB,, | Performed by: INTERNAL MEDICINE

## 2022-02-18 PROCEDURE — 84443 ASSAY THYROID STIM HORMONE: CPT | Performed by: INTERNAL MEDICINE

## 2022-02-18 PROCEDURE — 80061 LIPID PANEL: CPT | Performed by: INTERNAL MEDICINE

## 2022-02-18 PROCEDURE — 3066F PR DOCUMENTATION OF TREATMENT FOR NEPHROPATHY: ICD-10-PCS | Mod: CPTII,S$GLB,, | Performed by: INTERNAL MEDICINE

## 2022-02-18 PROCEDURE — 3075F PR MOST RECENT SYSTOLIC BLOOD PRESS GE 130-139MM HG: ICD-10-PCS | Mod: CPTII,S$GLB,, | Performed by: INTERNAL MEDICINE

## 2022-02-18 PROCEDURE — 85025 COMPLETE CBC W/AUTO DIFF WBC: CPT | Performed by: INTERNAL MEDICINE

## 2022-02-18 PROCEDURE — 3079F PR MOST RECENT DIASTOLIC BLOOD PRESSURE 80-89 MM HG: ICD-10-PCS | Mod: CPTII,S$GLB,, | Performed by: INTERNAL MEDICINE

## 2022-02-18 RX ORDER — ATENOLOL 100 MG/1
100 TABLET ORAL DAILY
COMMUNITY
Start: 2021-11-29 | End: 2023-12-19

## 2022-02-18 RX ORDER — INSULIN PUMP SYRINGE, 3 ML
EACH MISCELLANEOUS
Qty: 1 EACH | Refills: 1 | Status: SHIPPED | OUTPATIENT
Start: 2022-02-18

## 2022-02-18 RX ORDER — AMLODIPINE BESYLATE 10 MG/1
10 TABLET ORAL DAILY
COMMUNITY
Start: 2022-01-14

## 2022-02-18 RX ORDER — LANCETS
EACH MISCELLANEOUS
Qty: 200 EACH | Refills: 5 | Status: SHIPPED | OUTPATIENT
Start: 2022-02-18

## 2022-02-18 RX ORDER — FLASH GLUCOSE SENSOR
KIT MISCELLANEOUS
Qty: 2 KIT | Refills: 11 | Status: SHIPPED | OUTPATIENT
Start: 2022-02-18 | End: 2023-01-26 | Stop reason: SDUPTHER

## 2022-02-18 NOTE — PATIENT INSTRUCTIONS
Start metformin using the following schedule    Week 1 Take 1 pill with dinner (500 mg)  Week 2 Take 1 pill with breakfast or morning snack and one pill with dinner (500  Mg twice a day)  Week 3 Take 2 pills with dinner, 1 with breakfast  Week 4 Take 2 pills with dinner, 2 with breakfast      Start taking Ozempic 0.25 mg once weekly for 4 weeks then increase to 0.5 mg once weekly    Thank you for enrolling in [facility]. Please follow the instructions below to securely access your online medical record. [employer] allows you to send messages to your doctor, view your test results, renew your prescriptions, schedule appointments, and more.     How Do I Sign Up?  1. In your Internet browser, go to [email or web address].  2. In the lower right of the page, click the Activate Now link located under the Have Access Code? Title.  3. Enter your [facility] Access Code exactly as it appears below. You will not need to use this code after youve completed the sign-up process. If you do not sign up before the expiration date, you must request a new code.  [facility] Access Code: [email or web address]  Expires: 4/4/2022  2:07 PM    4. Enter Date of Birth (mm/dd/yyyy) as indicated and click the Next button. You will be taken to the next sign-up page.  5. Create a [facility] ID. This will be your new [facility] login ID and cannot be changed, so think of one that is secure and easy to remember.  6. Create a [facility] password.  Your password must be at least 8 characters long and contain at least 1 letter and 1 number.  You can change your password at any time.  7. Enter your Password Reset Question and Answer, then click the Next button.   8. Enter your e-mail address. You will receive e-mail notification when new information is available in [facility].  9. Click Sign Up. You can now view your medical record.     Additional Information  If you have questions, you can email [email or web address] or call [phone]  to talk to our [facility]  staff. Remember, [facility] is NOT to be used for urgent needs. For medical emergencies, dial 911.

## 2022-02-18 NOTE — LETTER
February 18, 2022      [facility] 6th Fl  [address]  [facility] [address]  Phone: [phone]  Fax: [phone]       Patient: Rand Kaye   YOB: 1960  Date of Visit: 02/18/2022    To Whom It May Concern:    Kristel Kaye  was at [facility] on 02/18/2022. The patient may return to work on February 20, 2022 {With no restrictions. If you have any questions or concerns, or if I can be of further assistance, please do not hesitate to contact me.    Sincerely,    Breezy Weiner MA

## 2022-02-18 NOTE — PROGRESS NOTES
ENDOCRINOLOGY CLINIC  02/18/2022     Last seen in endocrine clinic by Dr. Otero, first visit with me today.      Subjective:      CC: re-establishing care for T2 diabetes and hx of PTC    HPI:   Rand Kaye is a 61 y.o. female with hx of anemia (due to fibroids s/p hysterectomy in 2013) who presents for management of uncontrolled type 2 diabetes and postsurgical hypothyroidism that status post thyroidectomy and history of PTC.       Regarding hx of thyroid cancer:  Had thyroidectomy in 2012 for MNG w/ compressive symptoms. Incidentally found to have a 1 cm papillary thyroid cancer in left lobe after surgery. Received 30 mCi iodine after surgery, but did not show for WBS. Started on synthroid.     Thyroglobulin previously 8.8, but most recent 2.7 in 2018, not recently checked.  Results for RAND KAYE (MRN [id number]) as of 2/18/2022 12:51   Ref. Range 9/13/2016 08:02 3/6/2018 19:08 8/3/2018 16:20   TSH Latest Ref Range: 0.400 - 4.000 uIU/mL 4.916 (H) 4.620 (H) 0.814   Free T4 Latest Ref Range: 0.71 - 1.51 ng/dL 0.87 0.65 (L) 1.08   Thyroglobulin Interpretation Unknown SEE BELOW  SEE BELOW   Thyroglobulin Antibody Screen Latest Ref Range: <4.0 IU/mL <1.8  <1.8   Thyroglobulin, Tumor Marker Latest Units: ng/mL 8.6 (H)  2.7 (H)      Neck US  8/2018 - There is a 5 cm hypoechoic subcutaneous structure at the left lateral neck.  There is additional cystic structure at the thyroid surgical bed.  These findings are not specific yet not well characterized with ultrasound.  While this may be merely a lipoma, in light of the patient's history of papillary thyroid carcinoma, contrast enhanced CT or MRI of the neck would be recommended as the appearance is nonspecific.    CT neck   9/2018  1.2 cm enhancing structure in the left thyroid resection bed.  Findings are nonspecific and may represent residual thyroid tissue or vascular structure, however in this patient with a history of papillary thyroid carcinoma continued  surveillance is recommended.    Currently taking:  Levothyroxine 125 mcg daily   Denies missed doses     Thyroid Symptoms  No fatigue    Weight change:  []  Gain []  Loss  [x]  Denies   Wt Readings from Last 3 Encounters:   02/18/22 100.7 kg (222 lb 0.1 oz)   08/07/18 105.7 kg (233 lb 0.4 oz)   03/06/18 108.9 kg (240 lb)      Temperature intolerance:  []  Cold []  Hot   [x]  Denies     GI:  []  Diarrhea []  Constipation [x]  Denies    Integument:  []  Hair loss []  Dry skin  [x]  Denies    Other:  [x]  Palpitation (infrequent, lasting few seconds) []  tremor     []  Increased anxiety    []  Denies      Regarding T2DM:    Denies history of pancreatitis or medullary thyroid cancer.  History recurrent UTI: No  History of recurrent fungal infection: No    Polyuria: yes  Polydipsia: yes    Diabetes Hx:  Diagnosed w/ DM: T2DM diagnosed around 2009  Complications: none known  Current meds: compliant with    Metformin  mg with dinner    Hypoglycemia: denies  Home glucose checks: not checking, no functional meter  Diet/Exercise:   Eating 3 meals per day  Drinking reg soda daily and sweet tea   Doing aerobics at home  Last A1c:   Lab Results   Component Value Date    HGBA1C 10.1 (H) 08/03/2018    HGBA1C 9.3 (H) 09/10/2016    HGBA1C 8.7 (H) 07/30/2015     microalbumin: on benazepril 20 mg daily  Lab Results   Component Value Date    LABMICR 89.0 09/13/2016    CREATRANDUR 94.0 09/13/2016    MICALBCREAT 94.7 (H) 09/13/2016     Lipids: on pravastatin 20 mg daily  Lab Results   Component Value Date    CHOL 222 (H) 08/03/2018    TRIG 191 (H) 08/03/2018    HDL 66 08/03/2018    LDLCALC 117.8 08/03/2018    CHOLHDL 29.7 08/03/2018     Aspirin: no   TSH:  Lab Results   Component Value Date    TSH 0.814 08/03/2018     Eye: some blurring at night, needs visit   Last eye exam: Most Recent Eye Exam Date: Not Found)  Foot: + neuropathy, no hx of ulcers   Last foot exam: Most Recent Foot Exam Date: Not Found)      Past Medical History:    Diagnosis Date    Diabetes mellitus type II     Fibroids     Hypertension     Thyroid cancer 2011    thyroid       Past Surgical History:   Procedure Laterality Date    BILATERAL SALPINGOOPHORECTOMY  10/28/13    HYSTERECTOMY Bilateral 10/28/2013    supracervical with BSO secondary to uterine fibroids    OOPHORECTOMY      SALPINGOOPHORECTOMY Bilateral 10/2013    2/2 uterine fibroids    supracervical hysterectomy  10/28/13    THYROID SURGERY      TUBAL LIGATION         Review of patient's allergies indicates:  No Known Allergies      Current Outpatient Medications:     aspirin (ECOTRIN) 81 MG EC tablet, Take 81 mg by mouth once daily. Last dose preop was 10/23/13, Disp: , Rfl:     atenolol-chlorthalidone (TENORETIC) 100-25 mg per tablet, TAKE 1 TABLET BY MOUTH EVERY DAY, Disp: 30 tablet, Rfl: 0    benazepril (LOTENSIN) 20 MG tablet, TAKE 1 TABLET (20 MG TOTAL) BY MOUTH ONCE DAILY., Disp: 90 tablet, Rfl: 1    betamethasone dipropionate (DIPROLENE) 0.05 % ointment, Apply topically 2 (two) times daily. PRN for 10 days, Disp: 45 g, Rfl: 0    blood sugar diagnostic Strp, To check BG 4 times daily, to use with insurance preferred meter, Disp: 200 each, Rfl: 6    blood-glucose meter kit, To check BG 4 times daily, to use with insurance preferred meter, Disp: 1 each, Rfl: 0    empagliflozin 10 mg Tab, Take 10 mg by mouth every morning., Disp: 30 tablet, Rfl: 5    ergocalciferol (ERGOCALCIFEROL) 50,000 unit Cap, Take 1 capsule (50,000 Units total) by mouth every 7 days. (Patient taking differently: Take 50,000 Units by mouth every 7 days. ), Disp: 12 capsule, Rfl: 1    fish oil-omega-3 fatty acids 300-1,000 mg capsule, Take 2 g by mouth once daily., Disp: , Rfl:     lancets Misc, To check BG 4 times daily, to use with insurance preferred meter, Disp: 200 each, Rfl: 6    metFORMIN (GLUCOPHAGE-XR) 500 MG 24 hr tablet, Take 2 tablets (1,000 mg total) by mouth 2 (two) times daily., Disp: 180 tablet, Rfl:  2    pravastatin (PRAVACHOL) 20 MG tablet, Take 1 tablet (20 mg total) by mouth once daily., Disp: 90 tablet, Rfl: 1    SYNTHROID 125 mcg tablet, TAKE 1 TABLET (125 MCG TOTAL) BY MOUTH ONCE DAILY., Disp: 90 tablet, Rfl: 1    Social History     Socioeconomic History    Marital status:    Tobacco Use    Smoking status: Former Smoker     Packs/day: 1.00     Years: 3.00     Pack years: 3.00     Types: Cigarettes     Quit date: 1993     Years since quittin.0    Smokeless tobacco: Never Used   Substance and Sexual Activity    Alcohol use: Yes     Alcohol/week: 0.0 standard drinks     Comment: occasionally    Drug use: No    Sexual activity: Never     Birth control/protection: Surgical, Abstinence, See Surgical Hx       Family History   Problem Relation Age of Onset    Thyroid disease Mother     Heart attack Father     Diabetes type II Father     Breast cancer Neg Hx     Colon cancer Neg Hx     Ovarian cancer Neg Hx        ROS: see HPI     Objective:   Physical Exam   LMP 2013   Wt Readings from Last 3 Encounters:   18 105.7 kg (233 lb 0.4 oz)   18 108.9 kg (240 lb)   17 107.9 kg (237 lb 12.3 oz)   ]    Constitutional:  Pleasant,  in no acute distress.   HENT:   Head:    Normocephalic and atraumatic.   Eyes:    EOMI. No scleral icterus.   Neck:    No t palpable thyroid nodules, no cervical LAD  Cardiovascular:  Normal rate, regular rhythm, 2+ radial pulses blx   Respiratory:   Effort normal   Gastrointestinal: Soft, nontender  Neurological:  No tremor, normal speech  Skin:    Skin is warm, dry  Psych:  Normal mood and affect.   Extremity:  No edema or wounds, no deformity           LABORATORY REVIEW:  See HPI for other labs reviewed today      Chemistry        Component Value Date/Time     2019 1730    K 3.7 2019 1730     2019 1730    CO2 28 2019 1730    BUN 15 2019 1730    CREATININE 0.9 2019 1730     (H)  01/18/2019 1730        Component Value Date/Time    CALCIUM 10.5 01/18/2019 1730    ALKPHOS 59 08/03/2018 1620    AST 22 08/03/2018 1620    ALT 24 08/03/2018 1620    BILITOT 0.3 08/03/2018 1620    ESTGFRAFRICA >60.0 01/18/2019 1730    EGFRNONAA >60.0 01/18/2019 1730          Lab Results   Component Value Date    HGBA1C 10.1 (H) 08/03/2018    HGBA1C 9.3 (H) 09/10/2016    HGBA1C 8.7 (H) 07/30/2015     Other labs reviewed today in HPI    Assessment/Plan:     Problem List Items Addressed This Visit        1 - High    History of papillary adenocarcinoma of thyroid     History of elevated thyroglobulin and previous ultrasound with possible residual thyroid tissue.  Will recheck thyroglobulin levels to trend and check neck ultrasound.           Relevant Orders    Thyroglobulin    US Soft Tissue Head Neck Thyroid       2     Uncontrolled type 2 diabetes mellitus with hyperglycemia, without long-term current use of insulin - Primary     Limited data to review as patient has not had recent hemoglobin A1c and has not been checking home blood sugars however I suspect she continues to have hyperglycemia based on symptoms of polyuria and polydipsia.  Will get baseline labs, start Ozempic, and increase metformin.  We discussed lifestyle modifications and she is motivated to make dietary changes.  Will have her meet with Diabetes Education to discuss this further.           Relevant Medications    semaglutide (OZEMPIC) 0.25 mg or 0.5 mg(2 mg/1.5 mL) pen injector    flash glucose sensor (FREESTYLE SHIN 2 SENSOR) Kit    blood-glucose meter kit    lancets Misc    blood sugar diagnostic Strp    Other Relevant Orders    Comprehensive Metabolic Panel (Completed)    Hemoglobin A1C (Completed)    Lipid Panel (Completed)    Microalbumin/Creatinine Ratio, Urine (Completed)    CBC Auto Differential (Completed)    Ambulatory referral/consult to Diabetes Education    Ambulatory referral/consult to Internal Medicine    Ambulatory  referral/consult to Optometry       3     Postoperative hypothyroidism     Check TSH with goal of keeping it in the lower half of the normal range.  She is clinically euthyroid today           Relevant Orders    TSH (Completed)    T4, Free (Completed)        Patient Instructions       Start metformin using the following schedule    Week 1 Take 1 pill with dinner (500 mg)  Week 2 Take 1 pill with breakfast or morning snack and one pill with dinner (500  Mg twice a day)  Week 3 Take 2 pills with dinner, 1 with breakfast  Week 4 Take 2 pills with dinner, 2 with breakfast      Start taking Ozempic 0.25 mg once weekly for 4 weeks then increase to 0.5 mg once weekly    Thank you for enrolling in [facility]. Please follow the instructions below to securely access your online medical record. My allows you to send messages to your doctor, view your test results, renew your prescriptions, schedule appointments, and more.     How Do I Sign Up?  1. In your Internet browser, go to [email or web address].  2. In the lower right of the page, click the Activate Now link located under the Have Access Code? Title.  3. Enter your [facility] Access Code exactly as it appears below. You will not need to use this code after youve completed the sign-up process. If you do not sign up before the expiration date, you must request a new code.  [facility] Access Code: [email or web address]  Expires: 4/4/2022  2:07 PM    4. Enter Date of Birth (mm/dd/yyyy) as indicated and click the Next button. You will be taken to the next sign-up page.  5. Create a [facility] ID. This will be your new [facility] login ID and cannot be changed, so think of one that is secure and easy to remember.  6. Create a [facility] password.  Your password must be at least 8 characters long and contain at least 1 letter and 1 number.  You can change your password at any time.  7. Enter your Password Reset Question and Answer, then click the Next button.   8. Enter your e-mail  address. You will receive e-mail notification when new information is available in [facility].  9. Click Sign Up. You can now view your medical record.     Additional Information  If you have questions, you can email [email or web address] or call [phone]  to talk to our [facility] staff. Remember, [facility] is NOT to be used for urgent needs. For medical emergencies, dial 911.          Return to clinic in 3-4 months with me or fellow  Needs labs now  Neck US and diabetic educator soon    Tom Palmer MD

## 2022-02-21 LAB
THRYOGLOBULIN INTERPRETATION: ABNORMAL
THYROGLOB AB SERPL-ACNC: <1.8 IU/ML
THYROGLOB SERPL-MCNC: 2.5 NG/ML

## 2022-02-21 NOTE — ASSESSMENT & PLAN NOTE
History of elevated thyroglobulin and previous ultrasound with possible residual thyroid tissue.  Will recheck thyroglobulin levels to trend and check neck ultrasound.

## 2022-02-21 NOTE — ASSESSMENT & PLAN NOTE
Check TSH with goal of keeping it in the lower half of the normal range.  She is clinically euthyroid today

## 2022-02-21 NOTE — ASSESSMENT & PLAN NOTE
Limited data to review as patient has not had recent hemoglobin A1c and has not been checking home blood sugars however I suspect she continues to have hyperglycemia based on symptoms of polyuria and polydipsia.  Will get baseline labs, start Ozempic, and increase metformin.  We discussed lifestyle modifications and she is motivated to make dietary changes.  Will have her meet with Diabetes Education to discuss this further.

## 2022-02-25 DIAGNOSIS — E78.5 HYPERLIPIDEMIA, UNSPECIFIED HYPERLIPIDEMIA TYPE: ICD-10-CM

## 2022-02-25 DIAGNOSIS — E89.0 POSTOPERATIVE HYPOTHYROIDISM: ICD-10-CM

## 2022-02-25 DIAGNOSIS — E11.65 UNCONTROLLED TYPE 2 DIABETES MELLITUS WITH HYPERGLYCEMIA, WITHOUT LONG-TERM CURRENT USE OF INSULIN: Primary | ICD-10-CM

## 2022-02-25 DIAGNOSIS — Z85.850 HISTORY OF PAPILLARY ADENOCARCINOMA OF THYROID: ICD-10-CM

## 2022-02-25 RX ORDER — LEVOTHYROXINE SODIUM 125 UG/1
TABLET ORAL
Qty: 90 TABLET | Refills: 0 | Status: SHIPPED | OUTPATIENT
Start: 2022-02-25 | End: 2022-05-12

## 2022-02-25 NOTE — PROGRESS NOTES
Please schedule hemoglobin A1c, TSH, lipid panel fasting few days before her follow-up visit with me.   Please call patient encouraged her to sign up for the patient portal so that she can see her test results and communicate with her providers.  Please let patient know the following:    Dr. Palmer has reviewed your lab results and they show that you are getting a little bit too much thyroid hormone so she would like you to reduce your thyroid hormone dose by taking only half a tablet on Sundays (this means that you will be taking a total of 6 and half pills over the span of a week).  The thyroglobulin which is a substance produced by thyroid tissue was detectable so there may be a small piece of leftover thyroid tissue in your neck.  We will be able to get more information about this once we have the results of the neck ultrasound.  Regarding your diabetes, the hemoglobin A1c is elevated at 9.1 so the changes we made to your medications should help with this in addition to working on your diet.  Your cholesterol is higher than I would like which sometimes can be a result of not consistently taking the pravastatin.  Please try to not miss any doses of your pravastatin and if in the future the cholesterol is still high we may need to switch you to a different type of cholesterol medication.

## 2022-03-04 ENCOUNTER — CLINICAL SUPPORT (OUTPATIENT)
Dept: DIABETES | Facility: CLINIC | Age: 62
End: 2022-03-04
Payer: COMMERCIAL

## 2022-03-04 DIAGNOSIS — E11.65 UNCONTROLLED TYPE 2 DIABETES MELLITUS WITH HYPERGLYCEMIA, WITHOUT LONG-TERM CURRENT USE OF INSULIN: ICD-10-CM

## 2022-03-04 PROCEDURE — G0108 PR DIAB MANAGE TRN  PER INDIV: ICD-10-PCS | Mod: S$GLB,,, | Performed by: INTERNAL MEDICINE

## 2022-03-04 PROCEDURE — G0108 DIAB MANAGE TRN  PER INDIV: HCPCS | Mod: S$GLB,,, | Performed by: INTERNAL MEDICINE

## 2022-03-04 PROCEDURE — 99999 PR PBB SHADOW E&M-EST. PATIENT-LVL I: CPT | Mod: PBBFAC,,,

## 2022-03-04 PROCEDURE — 99999 PR PBB SHADOW E&M-EST. PATIENT-LVL I: ICD-10-PCS | Mod: PBBFAC,,,

## 2022-03-07 NOTE — PROGRESS NOTES
Diabetes Care Specialist Progress Note  Author: Brittney Tabor RD, CDE  Date: 3/7/2022    Program Intake  Reason for Diabetes Program Visit:: Initial Diabetes Assessment  Type of Intervention:: Individual  Individual: Education    Education: Self-Management Skill Review    Current diabetes risk level:: moderate      Lab Results   Component Value Date    HGBA1C 9.1 (H) 02/18/2022         Clinical  Problem Review  Reviewed Problem List with Patient: yes  Active comorbidities affecting diabetes self-care.: no  Reviewed health maintenance: yes    Clinical Assessment  Current Diabetes Treatment: Oral Medication, Injectable  Metformin daily (to increase to 1000 mg BID)    To start Ozempic      Have you ever experienced hypoglycemia (low blood sugar)?: no  Have you ever experienced hyperglycemia (high blood sugar)?: no (but not currently testing)          Medication Information  How many days a week do you miss your medications?: Never  Do you sometimes have difficulty refilling your medications?: No  Medication adherence impacting ability to self-manage diabetes?: No    Labs  Do you have regular lab work to monitor your medications?: Yes  Lab Compliance Barriers: No          Nutritional Status  Diet: Regular (3 meals daily)  Change in appetite?: No  Recent Changes in Weight: No Recent Weight Change  Current nutritional status an area of need that is impacting patient's ability to self-manage diabetes?: No          Additional Social History  Support  Does anyone support you with your diabetes care?: yes  Who supports you?: son/daughter, self  Who takes you to your medical appointments?: self, son/daughter  Does the current support meet the patient's needs?: Yes  Is Support an area impacting ability to self-manage diabetes?: No    Access to Mass Media & Technology  Media or technology needs impacting ability to self-manage diabetes?: No    Cognitive/Behavioral Health  Alert and Oriented: Yes  Difficulty Thinking:  No  Requires Prompting: No  Requires assistance for routine expression?: No  Cognitive or behavioral barriers impacting ability to self-manage diabetes?: No    Culture/Anglican  Culture or Protestant beliefs that may impact ability to access healthcare: No    Communication  Language preference: English  Hearing Problems: No  Vision Problems: No  Communication needs impacting ability to self-manage diabetes?: No    Health Literacy  Preferred Learning Method: Face to Face, Demonstration, Hands On, Reading Materials  How often do you need to have someone help you read instructions, pamphlets, or written material from your doctor or pharmacy?: Never  Health literacy needs impacting ability to self-manage diabetes?: No              Diabetes Self-Management Skills Assessment  Diabetes Disease Process/Treatment Options  Patient/caregiver able to state what happens when someone has diabetes.: yes  Patient/caregiver knows what type of diabetes they have.: yes  Diabetes Type : Type II  Patient/caregiver able to identify at least three signs and symptoms of diabetes.: yes  Patient able to identify at least three risk factors for diabetes.: yes  Diabetes Disease Process/Treatment Options: Skills Assessment Completed: Yes  Assessment indicates:: Adequate understanding  Area of need?: No    Nutrition/Healthy Eating  Challenges to healthy eating:: eating out, going to parties, portion control, snacking between meals and at night  Method of carbohydrate measurement:: no method  Patient can identify foods that impact blood sugar.: yes  Nutrition/Healthy Eating Skills Assessment Completed:: Yes  Assessment indicates:: Instruction Needed  Area of need?: Yes    Physical Activity/Exercise  Patient formally exercises outside of work.: yes  Exercise Type:  (doing some aerobics)  Patient can identify forms of physical activity.: yes  Patient can identify reasons why exercise/physical activity is important in diabetes management.:  yes  Physical Activity/Exercise Skills Assessment Completed: : Yes  Assessment indicates:: Instruction Needed  Area of need?: Yes    Medications  Patient is able to describe current diabetes management routine.: yes  Patient is able to identify current diabetes medications, dosages, and appropriate timing of medications.: yes  Patient understands the purpose of the medications taken for diabetes.: no  Patient reports problems or concerns with current medication regimen.: no  Medication Skills Assessment Completed:: Yes  Assessment indicates:: Instruction Needed  Area of need?: Yes    Home Blood Glucose Monitoring  Patient states that blood sugar is checked at home daily.: no  Home Blood Glucose Monitoring Skills Assessment Completed: : Yes  Assessment indicates:: Instruction Needed  Area of need?: Yes    Acute Complications  Patient is able to identify types of acute complications: No  Acute Complications Skills Assessment Completed: : Yes  Assessment indicates:: Instruction Needed  Area of need?: Yes    Chronic Complications  Patient can identify major chronic complications of diabetes.: yes  Patient can identify ways to prevent or delay diabetes complications.: yes  Chronic Complications Skills Assessment Completed: : Yes  Assessment indicates:: Adequate understanding  Area of need?: No    Psychosocial/Coping  Patient can identify ways of coping with chronic disease.: yes  Psychosocial/Coping Skills Assessment Completed: : Yes  Assessment indicates:: Adequate understanding  Area of need?: No                Diabetes Self Support Plan     Assessment Summary and Plan    Based on today's diabetes care assessment, the following areas of need were identified:      Social 3/4/2022   Support No   Access to Mass Media/Tech No   Cognitive/Behavioral Health No   Culture/Restoration No   Communication No   Health Literacy No        Clinical 3/4/2022   Medication Adherence No   Lab Compliance No   Nutritional Status No         Diabetes Self-Management Skills 3/4/2022   Diabetes Disease Process/Treatment Options No   Nutrition/Healthy Eating Yes  See Care Plan     Physical Activity/Exercise Yes  -Discussed goals and benefits of regular physical activity.   -Reviewed difference between active lifestyle and structured physical activity.   -Encouraged physical activity with increased heart rate for sustained duration as tolerated.   -Reviewed physical activity goals of >150 minutes per week.     Medication Yes  -Discussed mechanism of action of metformin and Ozempic.  -Reviewed prescribed doses and instructed on appropriate timing of each medication.  -Discussed possible side effects and how to avoid these.    -Reviewed need for rotation of injection sites, appropriate medication storage, and pen use.       Discussed titration schedules for each medication       Home Blood Glucose Monitoring Yes  See Care Plan     Acute Complications Yes  - Discussed hypoglycemia vs hyperglycemia symptoms and discussed appropriate treatments for each.   - Reviewed that pt is at no risk of hypo with current medication regimen.   - Discussed general vs severe hyperglycemia and risk of DKA/HHS.     Chronic Complications No   Psychosocial/Coping No      Today's interventions were provided through individual discussion, instruction, and written materials were provided.      Patient verbalized understanding of instruction and written materials.  Pt was able to return back demonstration of instructions today. Patient understood key points, needs reinforcement and further instruction.                     Diabetes Self-Management Care Plan:    Today's Diabetes Self-Management Care Plan was developed with Rand's input. Rand has agreed to work toward the following goal(s) to improve his/her overall diabetes control.      Care Plan: Diabetes Management   Updates made since 2/5/2022 12:00 AM      Problem: Healthy Eating       Long-Range Goal: Eat 3 meals daily  with 2-3 servings of Carbohydrate per meal.    Start Date: 3/4/2022   Priority: High   Barriers: Knowledge deficit; Lack of Motivation to Change   Note:    -Emphasized importance of eliminating all sugar sweetened beverages, including fruit juice. Discussed sugar free drink options.   -Discussed carb vs non-carb foods and instructed on appropriate amounts of carbs to have at meals and snacks.   -Recommended 30-45 gm carb at meals  -Recommended 0-15 gm carb at snacks  -Instructed/reviewed how to read nutrition label.   -Reviewed need to limit total/saturated fats despite lesser effect on BG increase.   -Encouraged carb sources primarily from whole grains, fresh/frozen fruits, and low-fat milk and yogurt.   -Discussed meal plans and snack ideas amenable to pt.        Task: Reviewed the sources and role of Carbohydrate, Protein, and Fat and how each nutrient impacts blood sugar. Completed 3/7/2022      Task: Provided visual examples using dry measuring cups, food models, and other familiar objects such as computer mouse, deck or cards, tennis ball etc. to help with visualization of portions. Completed 3/7/2022      Task: Explained how to count carbohydrates using the food label and the use of dry measuring cups for accurate carb counting. Completed 3/7/2022      Task: Discussed strategies for choosing healthier menu options when dining out. Completed 3/7/2022      Task: Recommended replacing beverages containing high sugar content with noncaloric/sugar free options and/or water. Completed 3/7/2022      Task: Review the importance of balancing carbohydrates with each meal using portion control techniques to count servings of carbohydrate and label reading to identify serving size and amount of total carbs per serving. Completed 3/7/2022      Task: Provided Sample plate method and reviewed the use of the plate to estimate amounts of carbohydrate per meal. Completed 3/7/2022      Problem: Blood Glucose Self-Monitoring        Goal: Patient agrees to check and record blood sugars 1-2 times per day.    Start Date: 3/4/2022   Priority: High   Barriers: Knowledge deficit   Note:    Discussed goal BGs for different times of day and in relation to meals. Instructed pt to test BG twice daily at varying times: fasting and 2-hours after any meal. Reviewed need for updated BG logs for all endo, PCP, and education appts.       Task: Reviewed the importance of self-monitoring blood glucose and keeping logs. Completed 3/7/2022      Task: Instructed on how to self-monitor blood glucose using a home glucometer, how to properly dispose of used strips and lancets after use, and how to appropriately store meter and supplies. Completed 3/7/2022      Task: Provided patient with blood glucose logs, reviewed appropriate timing and frequency to SMBG, education on parameters on when to notify provider and advised patient to bring logs to all appts with PCP/Endocrinologist/Diabetes Care Specialist. Completed 3/7/2022      Task: Discussed ways to minimize pain when monitoring blood glucose. Completed 3/7/2022                      Follow Up Plan     F/u with me in 3 months.        Today's care plan and follow up schedule was discussed with patient.  Rand verbalized understanding of the care plan, goals, and agrees to follow up plan.        The patient was encouraged to communicate with his/her health care provider/physician and care team regarding his/her condition(s) and treatment.  I provided the patient with my contact information today and encouraged to contact me via phone or [facility] Patient Portal as needed.           Length of Visit   Total Time: 60 Minutes

## 2022-04-27 ENCOUNTER — HOSPITAL ENCOUNTER (OUTPATIENT)
Dept: ENDOCRINOLOGY | Facility: CLINIC | Age: 62
Discharge: HOME | End: 2022-04-27
Attending: INTERNAL MEDICINE
Payer: COMMERCIAL

## 2022-04-27 ENCOUNTER — OFFICE VISIT (OUTPATIENT)
Dept: INTERNAL MEDICINE | Facility: CLINIC | Age: 62
End: 2022-04-27
Payer: COMMERCIAL

## 2022-04-27 ENCOUNTER — LAB VISIT (OUTPATIENT)
Dept: LAB | Facility: HOSPITAL | Age: 62
End: 2022-04-27
Payer: COMMERCIAL

## 2022-04-27 VITALS
DIASTOLIC BLOOD PRESSURE: 84 MMHG | WEIGHT: 224.19 LBS | SYSTOLIC BLOOD PRESSURE: 142 MMHG | HEART RATE: 54 BPM | BODY MASS INDEX: 32.1 KG/M2 | HEIGHT: 70 IN

## 2022-04-27 DIAGNOSIS — M79.602 MUSCULOSKELETAL PAIN OF LEFT UPPER EXTREMITY: ICD-10-CM

## 2022-04-27 DIAGNOSIS — Z11.4 ENCOUNTER FOR SCREENING FOR HIV: ICD-10-CM

## 2022-04-27 DIAGNOSIS — E11.65 UNCONTROLLED TYPE 2 DIABETES MELLITUS WITH HYPERGLYCEMIA, WITHOUT LONG-TERM CURRENT USE OF INSULIN: Primary | ICD-10-CM

## 2022-04-27 DIAGNOSIS — L20.9 ATOPIC DERMATITIS, UNSPECIFIED TYPE: ICD-10-CM

## 2022-04-27 DIAGNOSIS — Z76.89 ENCOUNTER TO ESTABLISH CARE: ICD-10-CM

## 2022-04-27 DIAGNOSIS — I10 ESSENTIAL HYPERTENSION: ICD-10-CM

## 2022-04-27 DIAGNOSIS — E78.2 MIXED HYPERLIPIDEMIA: ICD-10-CM

## 2022-04-27 DIAGNOSIS — Z00.00 ROUTINE GENERAL MEDICAL EXAMINATION AT A HEALTH CARE FACILITY: ICD-10-CM

## 2022-04-27 DIAGNOSIS — Z12.39 ENCOUNTER FOR SCREENING FOR MALIGNANT NEOPLASM OF BREAST, UNSPECIFIED SCREENING MODALITY: ICD-10-CM

## 2022-04-27 DIAGNOSIS — E89.0 POSTOPERATIVE HYPOTHYROIDISM: ICD-10-CM

## 2022-04-27 DIAGNOSIS — R22.1 NECK MASS: ICD-10-CM

## 2022-04-27 DIAGNOSIS — Z12.11 COLON CANCER SCREENING: ICD-10-CM

## 2022-04-27 DIAGNOSIS — Z85.850 HISTORY OF PAPILLARY ADENOCARCINOMA OF THYROID: ICD-10-CM

## 2022-04-27 DIAGNOSIS — Z11.59 ENCOUNTER FOR HEPATITIS C SCREENING TEST FOR LOW RISK PATIENT: ICD-10-CM

## 2022-04-27 DIAGNOSIS — Z12.31 ENCOUNTER FOR SCREENING MAMMOGRAM FOR MALIGNANT NEOPLASM OF BREAST: ICD-10-CM

## 2022-04-27 PROCEDURE — 76536 US SOFT TISSUE HEAD NECK THYROID: ICD-10-PCS | Mod: S$GLB,,, | Performed by: INTERNAL MEDICINE

## 2022-04-27 PROCEDURE — 86803 HEPATITIS C AB TEST: CPT | Performed by: STUDENT IN AN ORGANIZED HEALTH CARE EDUCATION/TRAINING PROGRAM

## 2022-04-27 PROCEDURE — 99203 PR OFFICE/OUTPT VISIT, NEW, LEVL III, 30-44 MIN: ICD-10-PCS | Mod: S$GLB,,, | Performed by: STUDENT IN AN ORGANIZED HEALTH CARE EDUCATION/TRAINING PROGRAM

## 2022-04-27 PROCEDURE — 36415 COLL VENOUS BLD VENIPUNCTURE: CPT | Performed by: STUDENT IN AN ORGANIZED HEALTH CARE EDUCATION/TRAINING PROGRAM

## 2022-04-27 PROCEDURE — 99999 PR PBB SHADOW E&M-EST. PATIENT-LVL V: CPT | Mod: PBBFAC,,, | Performed by: STUDENT IN AN ORGANIZED HEALTH CARE EDUCATION/TRAINING PROGRAM

## 2022-04-27 PROCEDURE — 76536 US EXAM OF HEAD AND NECK: CPT | Mod: S$GLB,,, | Performed by: INTERNAL MEDICINE

## 2022-04-27 PROCEDURE — 99203 OFFICE O/P NEW LOW 30 MIN: CPT | Mod: S$GLB,,, | Performed by: STUDENT IN AN ORGANIZED HEALTH CARE EDUCATION/TRAINING PROGRAM

## 2022-04-27 PROCEDURE — 87389 HIV-1 AG W/HIV-1&-2 AB AG IA: CPT | Performed by: STUDENT IN AN ORGANIZED HEALTH CARE EDUCATION/TRAINING PROGRAM

## 2022-04-27 PROCEDURE — 99999 PR PBB SHADOW E&M-EST. PATIENT-LVL V: ICD-10-PCS | Mod: PBBFAC,,, | Performed by: STUDENT IN AN ORGANIZED HEALTH CARE EDUCATION/TRAINING PROGRAM

## 2022-04-27 RX ORDER — BENAZEPRIL HYDROCHLORIDE 40 MG/1
20 TABLET ORAL DAILY
Qty: 45 TABLET | Refills: 1 | Status: SHIPPED | OUTPATIENT
Start: 2022-04-27 | End: 2022-05-12

## 2022-04-27 RX ORDER — ATORVASTATIN CALCIUM 80 MG/1
80 TABLET, FILM COATED ORAL DAILY
Qty: 90 TABLET | Refills: 3 | Status: SHIPPED | OUTPATIENT
Start: 2022-04-27 | End: 2023-12-19

## 2022-04-27 RX ORDER — BETAMETHASONE DIPROPIONATE 0.5 MG/G
OINTMENT TOPICAL 2 TIMES DAILY
Qty: 45 G | Refills: 0 | Status: SHIPPED | OUTPATIENT
Start: 2022-04-27 | End: 2022-05-07

## 2022-04-27 NOTE — PATIENT INSTRUCTIONS
-- increase benazepril to 40 mg daily  -- stop taking pravastatin. Start taking atorvastatin (lipitor) 80 mg daily
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
## 2022-04-28 ENCOUNTER — TELEPHONE (OUTPATIENT)
Dept: DIABETES | Facility: CLINIC | Age: 62
End: 2022-04-28
Payer: COMMERCIAL

## 2022-05-02 LAB
HCV AB SERPL QL IA: NEGATIVE
HIV 1+2 AB+HIV1 P24 AG SERPL QL IA: NEGATIVE

## 2022-05-10 NOTE — PROGRESS NOTES
Subjective:      Chief Complaint: post surgical hypothyroidism and diabetes mellitus type 2  HPI: Rand Kaye is a 61 y.o. female who is here for a follow up evaluation of papillary thyroid cancer and diabetes mellitus type 2    Regarding her history of thyroid cancer:  -She had thyroidectomy in 2012 for multi-nodular goiter with compressive symptoms. Incidentally found to have a 1 cm papillary thyroid cancer in left lobe after surgery. She received 30 mCi iodine after surgery, but did not show for WBS.   -On Synthyroid 125 mcg daily  -Thyroglobulin previously 8.8, but most recent was 2.5 in February 2022     Latest Reference Range & Units 09/13/16 08:02 03/06/18 19:08 08/03/18 16:20 02/18/22 14:41   TSH 0.400 - 4.000 uIU/mL 4.916 (H) 4.620 (H) 0.814 0.150 (L)   Free T4 0.71 - 1.51 ng/dL 0.87 0.65 (L) 1.08 1.20   Thyroglobulin Antibody Screen <1.8 IU/mL <1.8  <1.8 <1.8   Thyroglobulin, Tumor Marker ng/mL 8.6 (H) [4]  2.7 (H) [5] 2.5 (H) [6]       Ultrasound Neck   4/2022  -Status post thyroidectomy.  -No sonographic evidence of malignancy.  -Recommend to continue correlation with thyroglobulin levels and repeat neck US in 1 year to follow the 0.5 cm hypoechoic focus in the right inferior thyroid bed and the 1.5 cm hypoechoic focus in the left middle thyroid bed.    8/2018   There is a 5 cm hypoechoic subcutaneous structure at the left lateral neck.  There is additional cystic structure at the thyroid surgical bed.  These findings are not specific yet not well characterized with ultrasound.  While this may be merely a lipoma, in light of the patient's history of papillary thyroid carcinoma, contrast enhanced CT or MRI of the neck would be recommended as the appearance is nonspecific.     CT neck   9/2018  1.2 cm enhancing structure in the left thyroid resection bed.  Findings are nonspecific and may represent residual thyroid tissue or vascular structure, however in this patient with a history of papillary  thyroid carcinoma continued surveillance is recommended.     Pathology      Now regarding Post-Surgical Hypothyroidism  -Patient presents for evaluation of thyroid function. She had thyroidectomy in 2012 for multi-nodular goiter with compressive symptoms. Incidentally found to have a 1 cm papillary thyroid cancer in left lobe after surgery. She received 30 mCi iodine after surgery, but did not show for WBS.   -Current medication is levothyroxine 125 mcg daily  -Takes thyroid medication properly without food first thing in the morning   -Current symptoms:         No   Yes    [x]    []   Weight gain    []    [x]   Fatigue    [x]    []   Constipation    []    [x]   Hair loss    [x]    []   Brittle nails    []    [x]   Mental fog    [x]    []   Cold intolerance    [x]    []   Memory impair    []    [x]   Muscle weakness    []    [x]   Neck swelling    [x]    []   Hoarseness    [x]    []   Periorbital edema    [x]    []   Lithium or Amiodarone use    [x]    []   Recent severe illness              Lab Results   Component Value Date    TSH 0.150 (L) 02/18/2022    TSH 0.814 08/03/2018    TSH 4.620 (H) 03/06/2018    FREET4 1.20 02/18/2022    FREET4 1.08 08/03/2018    FREET4 0.65 (L) 03/06/2018    THYROPEROXID < 6.0 01/23/2012       Now Regarding diabetes:  -Patient was initially diagnosed with Type 2 diabetes mellitus in 2009  -When questioned about pertinent symptoms, denies any polyuria, polydipsia, nocturia, nausea vomiting, abdominal discomfort, numbness/tingling, visual change, weight change   -Last HbA1c in chart is 9.1 from February 2022  -Diabetic complications present  Neuropathy     : yes  Retinopathy    : denies it   Nephropathy   : yes    -Denies history of pancreatitis, medullary thyroid cancer, recurrent UTI or recurrent fungal infection       Current diabetic medications include:   Metformin 500 mg daily      Blood Sugar Range   Does not check her blood sugar with finger sticks at home  Has a CGM device but  does not remember the name of it and is not using it at home    Medication Compliance  -Patient has a history of poor compliance    Consultant Visits  -Diabetes educator: yes  -Ophthalmology Visit: saw 3 months ago in [place], does not have documentation present   -Podiatry Visit: no    Cardiovascular risk factors:   -diabetes mellitus, dyslipidemia, hypertension, and obesity (BMI >= 30 kg/m2)    Diet  Meals are:  Breakfast: sandwich, coffee and acosta sometimes  Lunch: fried chicken, rice  Dinner: rice, beans, pork    Eating 3 meals per day  Drinking diet coke, powerade, water and lemon tea     Does patient count carbohydrates? no      Diabetes History in Family  Father had Type 2 Diabetes Mellitus    Exercise:  Walks, Drive [place]    Hypoglycemic Episodes:  Denies it    Episodes of Diabetic Ketoacidosis:   Denies it    Previous Treatment:   Says might have tried insulin in 2010    Screening for Complications:    Dyslipidemia   Statin: Taking Atorvastatin 20 mg daily, LDL above goal at 145    Nephropathy  ACE/ARB: Taking Benazepril 20 mg daily      Diabetes Management Status  Screening or Prevention Patient's value Goal Complete/Controlled?   HgA1C Testing and Control   Lab Results   Component Value Date    HGBA1C 9.1 (H) 02/18/2022      Annually/Less than 8% No   Lipid profile : 02/18/2022 Annually Yes   LDL control Lab Results   Component Value Date    LDLCALC 145.6 02/18/2022    Annually/Less than 100 mg/dl  No   Nephropathy screening Lab Results   Component Value Date    LABMICR 400.0 02/18/2022     Lab Results   Component Value Date    PROTEINUA Negative 04/19/2012    Annually Yes   Blood pressure BP Readings from Last 1 Encounters:   05/12/22 (!) 217/108    Less than 140/90 No   Dilated retinal exam : 09/24/2018 Annually No   Foot exam   Most Recent Foot Exam Date: Not Found Annually No       Reviewed past medical, family, social history and updated as appropriate.    Review of Systems as  above    Objective:     Vitals:    05/12/22 0924   BP: (!) 148/88   Pulse: 63     BP Readings from Last 5 Encounters:   05/12/22 (!) 217/108   05/12/22 (!) 148/88   04/27/22 (!) 142/84   02/18/22 134/82   08/07/18 (!) 150/84       Physical Exam  Constitutional:       Appearance: She is obese.   HENT:      Head: Normocephalic and atraumatic.      Right Ear: External ear normal.      Left Ear: External ear normal.      Mouth/Throat:      Mouth: Mucous membranes are moist.   Eyes:      Conjunctiva/sclera: Conjunctivae normal.   Neck:      Comments: Fatty swelling on side of neck suggestive of lipoma  Cardiovascular:      Rate and Rhythm: Normal rate.   Pulmonary:      Effort: Pulmonary effort is normal.   Abdominal:      Palpations: Abdomen is soft.   Musculoskeletal:         General: Normal range of motion.      Cervical back: Normal range of motion.   Skin:     General: Skin is warm.   Neurological:      General: No focal deficit present.   Psychiatric:         Mood and Affect: Mood normal.         Wt Readings from Last 10 Encounters:   05/12/22 1047 101.2 kg (223 lb 3.5 oz)   05/12/22 0924 101.4 kg (223 lb 8.7 oz)   04/27/22 1359 101.7 kg (224 lb 3.3 oz)   02/18/22 1329 100.7 kg (222 lb 0.1 oz)   08/07/18 0821 105.7 kg (233 lb 0.4 oz)   03/06/18 1748 108.9 kg (240 lb)   03/24/17 1057 107.9 kg (237 lb 12.3 oz)   09/13/16 1241 108.8 kg (239 lb 13.8 oz)   02/01/16 1108 110.2 kg (243 lb)   08/05/15 1449 108.9 kg (240 lb)       Lab Results   Component Value Date    HGBA1C 9.1 (H) 02/18/2022     Lab Results   Component Value Date    CHOL 250 (H) 02/18/2022    HDL 73 02/18/2022    LDLCALC 145.6 02/18/2022    TRIG 157 (H) 02/18/2022    CHOLHDL 29.2 02/18/2022     Lab Results   Component Value Date     02/18/2022    K 3.9 02/18/2022     02/18/2022    CO2 31 (H) 02/18/2022     (H) 02/18/2022    BUN 10 02/18/2022    CREATININE 0.7 02/18/2022    CALCIUM 10.5 02/18/2022    PROT 7.6 02/18/2022    ALBUMIN 4.0  02/18/2022    BILITOT 0.5 02/18/2022    ALKPHOS 61 02/18/2022    AST 18 02/18/2022    ALT 21 02/18/2022    ANIONGAP 10 02/18/2022    ESTGFRAFRICA >60.0 02/18/2022    EGFRNONAA >60.0 02/18/2022    TSH 0.150 (L) 02/18/2022      Lab Results   Component Value Date    MICALBCREAT 384.6 (H) 02/18/2022       Assessment/Plan:     Uncontrolled type 2 diabetes mellitus with hyperglycemia, without long-term current use of insulin  -No blood sugar data to review apart from recent hemoglobin A1c as she has not been checking home blood sugars, however, I suspect she continues to have hyperglycemia based on symptoms of polyuria and polydipsia.  -Will start her on Ozempic 0.25 mg weekly, patient instructed to pick it up from her pharmacy  -Provided instructions on how to increase her metformin dose to target of 1000 mg twice daily     -She will meet with Diabetes Education today.  Would need education of continuous glucose monitoring and carbohydrate counting  -Foot examination performed today shows some evidence of neuropathy with slight loss of sensations in her big toes bilaterally  -Nephropathy present, will increase her dose of Benazepril to 40 mg daily  -LDL above goal, will start taking atorvastatin 80 mg dailay  -Lifestyle modifications, healthy eating habits recommended  -Patient requested and provided blood sugar logs, to be brought to her next appointment with us    History of papillary adenocarcinoma of thyroid  -Stage 1 papillary thyroid cancer s/p thyroidectomy and radioiodine in 2012   -History of elevated thyroglobulin and ultrasound with possible residual thyroid tissue but no lymphadenopathy   -Repeated thyroglobulin levels down trending, now at 2.5  -Will recommend to check neck ultrasound in 1 year  -TSH suppressed below goal of 0.5-2, will decrease dose of synthroid to 112 mcg daily and repeat blood work in 6 weeks    Postoperative hypothyroidism  - Decreasing synthroid to 112 mcg daily  - Will repeat TSH in 6  weeks and target a goal of 0.5-2      Follow up in 3 months    Dr. Juan Manuel Collins  [facility], [address]th Floor  [address] [place], [address]    Office: [phone]  Fax: [phone]    The above history labs imaging impression and plan were discussed with attending physician who is in agreement and also took part in this patient's care.  I personally reviewed all of the patients available medications, labs, imaging, vitals, allergies, medical history.

## 2022-05-12 ENCOUNTER — OFFICE VISIT (OUTPATIENT)
Dept: SURGERY | Facility: CLINIC | Age: 62
End: 2022-05-12
Payer: COMMERCIAL

## 2022-05-12 ENCOUNTER — CLINICAL SUPPORT (OUTPATIENT)
Dept: DIABETES | Facility: CLINIC | Age: 62
End: 2022-05-12
Payer: COMMERCIAL

## 2022-05-12 ENCOUNTER — OFFICE VISIT (OUTPATIENT)
Dept: ENDOCRINOLOGY | Facility: CLINIC | Age: 62
End: 2022-05-12
Payer: COMMERCIAL

## 2022-05-12 ENCOUNTER — OFFICE VISIT (OUTPATIENT)
Dept: PODIATRY | Facility: CLINIC | Age: 62
End: 2022-05-12
Payer: COMMERCIAL

## 2022-05-12 VITALS
WEIGHT: 223.13 LBS | SYSTOLIC BLOOD PRESSURE: 164 MMHG | HEIGHT: 70 IN | DIASTOLIC BLOOD PRESSURE: 74 MMHG | HEART RATE: 56 BPM | BODY MASS INDEX: 31.94 KG/M2

## 2022-05-12 VITALS
DIASTOLIC BLOOD PRESSURE: 88 MMHG | BODY MASS INDEX: 32.01 KG/M2 | SYSTOLIC BLOOD PRESSURE: 148 MMHG | HEIGHT: 70 IN | HEART RATE: 63 BPM | OXYGEN SATURATION: 98 % | WEIGHT: 223.56 LBS

## 2022-05-12 VITALS
SYSTOLIC BLOOD PRESSURE: 217 MMHG | TEMPERATURE: 98 F | BODY MASS INDEX: 31.95 KG/M2 | HEIGHT: 70 IN | HEART RATE: 64 BPM | OXYGEN SATURATION: 99 % | WEIGHT: 223.19 LBS | DIASTOLIC BLOOD PRESSURE: 108 MMHG

## 2022-05-12 DIAGNOSIS — E89.0 POSTOPERATIVE HYPOTHYROIDISM: Primary | ICD-10-CM

## 2022-05-12 DIAGNOSIS — I10 ESSENTIAL HYPERTENSION: ICD-10-CM

## 2022-05-12 DIAGNOSIS — R22.1 NECK MASS: ICD-10-CM

## 2022-05-12 DIAGNOSIS — E11.9 COMPREHENSIVE DIABETIC FOOT EXAMINATION, TYPE 2 DM, ENCOUNTER FOR: ICD-10-CM

## 2022-05-12 DIAGNOSIS — Z85.850 HISTORY OF PAPILLARY ADENOCARCINOMA OF THYROID: ICD-10-CM

## 2022-05-12 DIAGNOSIS — R20.2 PARESTHESIA OF BOTH LOWER EXTREMITIES: ICD-10-CM

## 2022-05-12 DIAGNOSIS — E11.65 UNCONTROLLED TYPE 2 DIABETES MELLITUS WITH HYPERGLYCEMIA, WITHOUT LONG-TERM CURRENT USE OF INSULIN: ICD-10-CM

## 2022-05-12 DIAGNOSIS — E11.49 TYPE II DIABETES MELLITUS WITH NEUROLOGICAL MANIFESTATIONS: Primary | ICD-10-CM

## 2022-05-12 PROCEDURE — 99999 PR PBB SHADOW E&M-EST. PATIENT-LVL IV: CPT | Mod: PBBFAC,,, | Performed by: STUDENT IN AN ORGANIZED HEALTH CARE EDUCATION/TRAINING PROGRAM

## 2022-05-12 PROCEDURE — 99214 PR OFFICE/OUTPT VISIT, EST, LEVL IV, 30-39 MIN: ICD-10-PCS | Mod: S$GLB,,, | Performed by: STUDENT IN AN ORGANIZED HEALTH CARE EDUCATION/TRAINING PROGRAM

## 2022-05-12 PROCEDURE — 3077F PR MOST RECENT SYSTOLIC BLOOD PRESSURE >= 140 MM HG: ICD-10-PCS | Mod: CPTII,S$GLB,, | Performed by: PODIATRIST

## 2022-05-12 PROCEDURE — G0108 PR DIAB MANAGE TRN  PER INDIV: ICD-10-PCS | Mod: S$GLB,,, | Performed by: INTERNAL MEDICINE

## 2022-05-12 PROCEDURE — 3008F PR BODY MASS INDEX (BMI) DOCUMENTED: ICD-10-PCS | Mod: CPTII,S$GLB,, | Performed by: STUDENT IN AN ORGANIZED HEALTH CARE EDUCATION/TRAINING PROGRAM

## 2022-05-12 PROCEDURE — 3066F NEPHROPATHY DOC TX: CPT | Mod: CPTII,S$GLB,, | Performed by: STUDENT IN AN ORGANIZED HEALTH CARE EDUCATION/TRAINING PROGRAM

## 2022-05-12 PROCEDURE — 99999 PR PBB SHADOW E&M-EST. PATIENT-LVL IV: ICD-10-PCS | Mod: PBBFAC,,, | Performed by: STUDENT IN AN ORGANIZED HEALTH CARE EDUCATION/TRAINING PROGRAM

## 2022-05-12 PROCEDURE — 3062F POS MACROALBUMINURIA REV: CPT | Mod: CPTII,S$GLB,, | Performed by: STUDENT IN AN ORGANIZED HEALTH CARE EDUCATION/TRAINING PROGRAM

## 2022-05-12 PROCEDURE — 3078F DIAST BP <80 MM HG: CPT | Mod: CPTII,S$GLB,, | Performed by: PODIATRIST

## 2022-05-12 PROCEDURE — 1160F RVW MEDS BY RX/DR IN RCRD: CPT | Mod: CPTII,S$GLB,, | Performed by: PODIATRIST

## 2022-05-12 PROCEDURE — 1160F PR REVIEW ALL MEDS BY PRESCRIBER/CLIN PHARMACIST DOCUMENTED: ICD-10-PCS | Mod: CPTII,S$GLB,, | Performed by: PODIATRIST

## 2022-05-12 PROCEDURE — 99999 PR PBB SHADOW E&M-EST. PATIENT-LVL II: CPT | Mod: PBBFAC,,,

## 2022-05-12 PROCEDURE — 99999 PR PBB SHADOW E&M-EST. PATIENT-LVL IV: ICD-10-PCS | Mod: PBBFAC,,, | Performed by: PODIATRIST

## 2022-05-12 PROCEDURE — 99203 PR OFFICE/OUTPT VISIT, NEW, LEVL III, 30-44 MIN: ICD-10-PCS | Mod: S$GLB,,, | Performed by: PODIATRIST

## 2022-05-12 PROCEDURE — 3046F PR MOST RECENT HEMOGLOBIN A1C LEVEL > 9.0%: ICD-10-PCS | Mod: CPTII,S$GLB,, | Performed by: PODIATRIST

## 2022-05-12 PROCEDURE — 3066F PR DOCUMENTATION OF TREATMENT FOR NEPHROPATHY: ICD-10-PCS | Mod: CPTII,S$GLB,, | Performed by: STUDENT IN AN ORGANIZED HEALTH CARE EDUCATION/TRAINING PROGRAM

## 2022-05-12 PROCEDURE — 3078F PR MOST RECENT DIASTOLIC BLOOD PRESSURE < 80 MM HG: ICD-10-PCS | Mod: CPTII,S$GLB,, | Performed by: PODIATRIST

## 2022-05-12 PROCEDURE — 99999 PR PBB SHADOW E&M-EST. PATIENT-LVL V: CPT | Mod: PBBFAC,,, | Performed by: STUDENT IN AN ORGANIZED HEALTH CARE EDUCATION/TRAINING PROGRAM

## 2022-05-12 PROCEDURE — 3046F HEMOGLOBIN A1C LEVEL >9.0%: CPT | Mod: CPTII,S$GLB,, | Performed by: PODIATRIST

## 2022-05-12 PROCEDURE — 3046F HEMOGLOBIN A1C LEVEL >9.0%: CPT | Mod: CPTII,S$GLB,, | Performed by: STUDENT IN AN ORGANIZED HEALTH CARE EDUCATION/TRAINING PROGRAM

## 2022-05-12 PROCEDURE — 3062F POS MACROALBUMINURIA REV: CPT | Mod: CPTII,S$GLB,, | Performed by: PODIATRIST

## 2022-05-12 PROCEDURE — 99999 PR PBB SHADOW E&M-EST. PATIENT-LVL II: ICD-10-PCS | Mod: PBBFAC,,,

## 2022-05-12 PROCEDURE — 3066F PR DOCUMENTATION OF TREATMENT FOR NEPHROPATHY: ICD-10-PCS | Mod: CPTII,S$GLB,, | Performed by: PODIATRIST

## 2022-05-12 PROCEDURE — 4010F PR ACE/ARB THEARPY RXD/TAKEN: ICD-10-PCS | Mod: CPTII,S$GLB,, | Performed by: STUDENT IN AN ORGANIZED HEALTH CARE EDUCATION/TRAINING PROGRAM

## 2022-05-12 PROCEDURE — 99203 OFFICE O/P NEW LOW 30 MIN: CPT | Mod: S$GLB,,, | Performed by: STUDENT IN AN ORGANIZED HEALTH CARE EDUCATION/TRAINING PROGRAM

## 2022-05-12 PROCEDURE — 3046F PR MOST RECENT HEMOGLOBIN A1C LEVEL > 9.0%: ICD-10-PCS | Mod: CPTII,S$GLB,, | Performed by: STUDENT IN AN ORGANIZED HEALTH CARE EDUCATION/TRAINING PROGRAM

## 2022-05-12 PROCEDURE — 3066F NEPHROPATHY DOC TX: CPT | Mod: CPTII,S$GLB,, | Performed by: PODIATRIST

## 2022-05-12 PROCEDURE — G0108 DIAB MANAGE TRN  PER INDIV: HCPCS | Mod: S$GLB,,, | Performed by: INTERNAL MEDICINE

## 2022-05-12 PROCEDURE — 4010F ACE/ARB THERAPY RXD/TAKEN: CPT | Mod: CPTII,S$GLB,, | Performed by: STUDENT IN AN ORGANIZED HEALTH CARE EDUCATION/TRAINING PROGRAM

## 2022-05-12 PROCEDURE — 3080F DIAST BP >= 90 MM HG: CPT | Mod: CPTII,S$GLB,, | Performed by: STUDENT IN AN ORGANIZED HEALTH CARE EDUCATION/TRAINING PROGRAM

## 2022-05-12 PROCEDURE — 99999 PR PBB SHADOW E&M-EST. PATIENT-LVL IV: CPT | Mod: PBBFAC,,, | Performed by: PODIATRIST

## 2022-05-12 PROCEDURE — 3008F PR BODY MASS INDEX (BMI) DOCUMENTED: ICD-10-PCS | Mod: CPTII,S$GLB,, | Performed by: PODIATRIST

## 2022-05-12 PROCEDURE — 3008F BODY MASS INDEX DOCD: CPT | Mod: CPTII,S$GLB,, | Performed by: PODIATRIST

## 2022-05-12 PROCEDURE — 3062F PR POS MACROALBUMINURIA RESULT DOCUMENTED/REVIEW: ICD-10-PCS | Mod: CPTII,S$GLB,, | Performed by: PODIATRIST

## 2022-05-12 PROCEDURE — 3080F PR MOST RECENT DIASTOLIC BLOOD PRESSURE >= 90 MM HG: ICD-10-PCS | Mod: CPTII,S$GLB,, | Performed by: STUDENT IN AN ORGANIZED HEALTH CARE EDUCATION/TRAINING PROGRAM

## 2022-05-12 PROCEDURE — 3008F BODY MASS INDEX DOCD: CPT | Mod: CPTII,S$GLB,, | Performed by: STUDENT IN AN ORGANIZED HEALTH CARE EDUCATION/TRAINING PROGRAM

## 2022-05-12 PROCEDURE — 1159F PR MEDICATION LIST DOCUMENTED IN MEDICAL RECORD: ICD-10-PCS | Mod: CPTII,S$GLB,, | Performed by: PODIATRIST

## 2022-05-12 PROCEDURE — 99203 PR OFFICE/OUTPT VISIT, NEW, LEVL III, 30-44 MIN: ICD-10-PCS | Mod: S$GLB,,, | Performed by: STUDENT IN AN ORGANIZED HEALTH CARE EDUCATION/TRAINING PROGRAM

## 2022-05-12 PROCEDURE — 3062F PR POS MACROALBUMINURIA RESULT DOCUMENTED/REVIEW: ICD-10-PCS | Mod: CPTII,S$GLB,, | Performed by: STUDENT IN AN ORGANIZED HEALTH CARE EDUCATION/TRAINING PROGRAM

## 2022-05-12 PROCEDURE — 99203 OFFICE O/P NEW LOW 30 MIN: CPT | Mod: S$GLB,,, | Performed by: PODIATRIST

## 2022-05-12 PROCEDURE — 99214 OFFICE O/P EST MOD 30 MIN: CPT | Mod: S$GLB,,, | Performed by: STUDENT IN AN ORGANIZED HEALTH CARE EDUCATION/TRAINING PROGRAM

## 2022-05-12 PROCEDURE — 4010F ACE/ARB THERAPY RXD/TAKEN: CPT | Mod: CPTII,S$GLB,, | Performed by: PODIATRIST

## 2022-05-12 PROCEDURE — 99999 PR PBB SHADOW E&M-EST. PATIENT-LVL V: ICD-10-PCS | Mod: PBBFAC,,, | Performed by: STUDENT IN AN ORGANIZED HEALTH CARE EDUCATION/TRAINING PROGRAM

## 2022-05-12 PROCEDURE — 3077F SYST BP >= 140 MM HG: CPT | Mod: CPTII,S$GLB,, | Performed by: PODIATRIST

## 2022-05-12 PROCEDURE — 3077F PR MOST RECENT SYSTOLIC BLOOD PRESSURE >= 140 MM HG: ICD-10-PCS | Mod: CPTII,S$GLB,, | Performed by: STUDENT IN AN ORGANIZED HEALTH CARE EDUCATION/TRAINING PROGRAM

## 2022-05-12 PROCEDURE — 4010F PR ACE/ARB THEARPY RXD/TAKEN: ICD-10-PCS | Mod: CPTII,S$GLB,, | Performed by: PODIATRIST

## 2022-05-12 PROCEDURE — 1159F MED LIST DOCD IN RCRD: CPT | Mod: CPTII,S$GLB,, | Performed by: PODIATRIST

## 2022-05-12 PROCEDURE — 3077F SYST BP >= 140 MM HG: CPT | Mod: CPTII,S$GLB,, | Performed by: STUDENT IN AN ORGANIZED HEALTH CARE EDUCATION/TRAINING PROGRAM

## 2022-05-12 RX ORDER — BENAZEPRIL HYDROCHLORIDE 40 MG/1
40 TABLET ORAL DAILY
Qty: 45 TABLET | Refills: 1 | Status: SHIPPED | OUTPATIENT
Start: 2022-05-12 | End: 2022-08-10

## 2022-05-12 RX ORDER — LEVOTHYROXINE SODIUM 112 UG/1
112 TABLET ORAL
Qty: 30 TABLET | Refills: 11 | Status: SHIPPED | OUTPATIENT
Start: 2022-05-12 | End: 2023-01-29

## 2022-05-12 RX ORDER — VIT C/E/ZN/COPPR/LUTEIN/ZEAXAN 250MG-90MG
1000 CAPSULE ORAL DAILY
COMMUNITY

## 2022-05-12 RX ORDER — DICLOFENAC SODIUM 10 MG/G
2 GEL TOPICAL 3 TIMES DAILY
Qty: 100 G | Refills: 3 | Status: SHIPPED | OUTPATIENT
Start: 2022-05-12

## 2022-05-12 NOTE — PROGRESS NOTES
I have reviewed and concur with Dr. Collins's history, physical, assessment, and plan.  I have personally interviewed and examined the patient.    Uncontrolled T2DM. Will meet with education today. We reviewed importance of glycemic control and compliance with prescribed medications. She has CGM at home (unsure which) and will try to get to start at education visit or can arrange to come another day  Increase metformin and start ozempic. Plan for SGLT2 at future visit although already with polyuria related to hyperglycemia  PTC with thyroglobulin with remains elevated in setting of remnant on US. Stable over time and will monitor    Claudine Morris MD

## 2022-05-12 NOTE — PROGRESS NOTES
"Subjective:      Patient ID: Rand Kaye is a 61 y.o. female.    Chief Complaint: Diabetic Foot Exam (Pcp-Maryellen 4/27/2022)    Rand is a 61 y.o. female who presents to the clinic upon referral from Dr. Bojorquez  for evaluation and treatment of diabetic feet. Rand has a past medical history of Diabetes mellitus type II, Fibroids, Hypertension, Mixed hyperlipidemia, and Thyroid cancer (01/01/2011). Patient relates no major problem with feet. Only complaints today consist of need for comprehensive DM foot exam. Gets some pins/needles and tingling in toes of both feet, mostly at night. Was recommended by PCP to have comprehensive DM foot exam. No other pedal concerns at this time.     PCP: Catherine Bojorquez, DO    Date Last Seen by PCP: 4/27/2022      Current shoe gear: Casual shoes    Vitals:    05/12/22 1406   BP: (!) 164/74   Pulse: (!) 56   Weight: 101.2 kg (223 lb 1.7 oz)   Height: 5' 10" (1.778 m)   PainSc: 0-No pain     Hemoglobin A1C   Date Value Ref Range Status   02/18/2022 9.1 (H) 4.0 - 5.6 % Final     Comment:     ADA Screening Guidelines:  5.7-6.4%  Consistent with prediabetes  >or=6.5%  Consistent with diabetes    High levels of fetal hemoglobin interfere with the HbA1C  assay. Heterozygous hemoglobin variants (HbS, HgC, etc)do  not significantly interfere with this assay.   However, presence of multiple variants may affect accuracy.     08/03/2018 10.1 (H) 4.0 - 5.6 % Final     Comment:     ADA Screening Guidelines:  5.7-6.4%  Consistent with prediabetes  >or=6.5%  Consistent with diabetes  High levels of fetal hemoglobin interfere with the HbA1C  assay. Heterozygous hemoglobin variants (HbS, HgC, etc)do  not significantly interfere with this assay.   However, presence of multiple variants may affect accuracy.     09/10/2016 9.3 (H) 4.5 - 6.2 % Final     Comment:     According to ADA guidelines, hemoglobin A1C <7.0% represents  optimal control in non-pregnant diabetic patients.  Different  metrics " may apply to specific populations.   Standards of Medical Care in Diabetes - 2016.  For the purpose of screening for the presence of diabetes:  <5.7%     Consistent with the absence of diabetes  5.7-6.4%  Consistent with increasing risk for diabetes   (prediabetes)  >or=6.5%  Consistent with diabetes  Currently no consensus exists for use of hemoglobin A1C  for diagnosis of diabetes for children.             Review of Systems   Constitutional: Negative for chills and fever.   Cardiovascular: Negative for chest pain, claudication and leg swelling.   Respiratory: Negative for cough and shortness of breath.    Skin: Negative for dry skin and nail changes.   Musculoskeletal:        High arch feet and hammertoes   Gastrointestinal: Negative for nausea and vomiting.   Neurological: Positive for paresthesias and sensory change. Negative for numbness.   Psychiatric/Behavioral: Negative for altered mental status.           Objective:      Physical Exam  Vitals reviewed.   Constitutional:       Appearance: She is well-developed.   HENT:      Head: Normocephalic.   Cardiovascular:      Pulses:           Dorsalis pedis pulses are 2+ on the right side and 2+ on the left side.        Posterior tibial pulses are 2+ on the right side and 2+ on the left side.      Comments: CRT < 3 sec to tips of toes. No edema noted to b/l LE. No vericosities noted to b/l LEs.     Pulmonary:      Effort: No respiratory distress.   Musculoskeletal:      Comments: Forefoot equinus with plantarflexed metatarsals 1-5 with hammertoe contractures to toes 2-5 and hallux malleus b/l hallux all reducible and asymptomatic. otherwise rectus foot and toe position bilateral with no major deformities noted. Mild equinus noted b/l ankles with < 10 deg DF noted. MMT 5/5 in DF/PF/Inv/Ev resistance with no reproduction of pain in any direction. Passive range of motion of ankle and pedal joints is painless b/l.     Skin:     General: Skin is warm and dry.       Findings: No erythema.      Comments: No open lesions, lacerations or wounds noted. Nails are normotrophic to R 1-5 and L 1-5. Interdigital spaces clean, dry and intact b/l. No erythema noted to b/l foot. Skin texture normal. Pedal hair normal. Skin temperature normal b/l foot.      Neurological:      Mental Status: She is alert and oriented to person, place, and time.      Sensory: Sensory deficit present.      Comments: Light touch, proprioception, and sharp/dull sensation are all intact bilaterally. Protective threshold with the [place] monofilament is decreased at toes bilaterally. Subjective paresthesias with no clearly identifiable source or trigger.        Psychiatric:         Behavior: Behavior normal.         Thought Content: Thought content normal.         Judgment: Judgment normal.               Assessment:       Encounter Diagnoses   Name Primary?    Uncontrolled type 2 diabetes mellitus with hyperglycemia, without long-term current use of insulin     Type II diabetes mellitus with neurological manifestations Yes    Comprehensive diabetic foot examination, type 2 DM, encounter for     Paresthesia of both lower extremities          Plan:       Rand was seen today for diabetic foot exam.    Diagnoses and all orders for this visit:    Type II diabetes mellitus with neurological manifestations    Uncontrolled type 2 diabetes mellitus with hyperglycemia, without long-term current use of insulin  -     Ambulatory referral/consult to Podiatry    Comprehensive diabetic foot examination, type 2 DM, encounter for    Paresthesia of both lower extremities    Other orders  -     diclofenac sodium (VOLTAREN) 1 % Gel; Apply 2 g topically 3 (three) times daily. Apply to the area of pain 2-3x per day or night as needed      I counseled the patient on her conditions, their implications and medical management.    - Shoe inspection. Diabetic Foot Education. Patient reminded of the importance of good  nutrition and blood sugar control to help prevent podiatric complications of diabetes. Patient instructed on proper foot hygeine. We discussed wearing proper shoe gear, daily foot inspections, never walking without protective shoe gear, caution putting sharp instruments to feet     - Discussed DM foot care:  Wear comfortable, proper fitting shoes. Wash feet daily. Dry well. After drying, apply moisturizer to feet (no lotion to webspaces). Inspect feet daily for skin breaks, blisters, swelling, or redness. Wear cotton socks (preferably white)  Change socks every day. Do NOT walk barefoot. Do NOT use heating pads or warm/hot water soaks     Rx Voltaren gel to be applied to affected area up to 3-4 x daily as needed for nerve pain. Consider Gabapentin if no improvement.     Long discussion with patient regarding appropriate, supportive and comfortable shoes. Recommended [employer] style shoe brands with adequate arch supports to alleviate abnormal pressure and improve stability of foot while walking. Avoid flat shoes and barefoot walking as these will exacerbate or worsen symptoms.     RTC 1 year, sooner PRN

## 2022-05-12 NOTE — PROGRESS NOTES
General Surgery  History & Physical    SUBJECTIVE:     CC: Left neck lipoma    History of Present Illness:  Rand Kaye is a 61 y.o. female with a history of papillary thyroid cancer (s/p total thyroidectomy in 2012), DM (A1c 9.1), HLD, and HTN who presents for evaluation of a left-sided neck mass. She first noticed this neck mass shortly after her thyroidectomy. Since then, the mass has grown significantly. She had a CT scan to evaluate the mass in the setting of prior papillary thyroid cancer, and the mass was characteristic of a lipoma on imaging. She opted for observation at that time. Since then, the mass has become more cosmetically bothersome and she is concerned about future compressive symptoms.    She did not receive neck radiation    Review of patient's allergies indicates:  No Known Allergies    Current Outpatient Medications   Medication Sig Dispense Refill    amLODIPine (NORVASC) 10 MG tablet Take 10 mg by mouth once daily.      aspirin (ECOTRIN) 81 MG EC tablet Take 81 mg by mouth once daily. Last dose preop was 10/23/13      atenoloL (TENORMIN) 100 MG tablet Take 100 mg by mouth once daily.      atenolol-chlorthalidone (TENORETIC) 100-25 mg per tablet TAKE 1 TABLET BY MOUTH EVERY DAY 30 tablet 0    atorvastatin (LIPITOR) 80 MG tablet Take 1 tablet (80 mg total) by mouth once daily. 90 tablet 3    benazepriL (LOTENSIN) 40 MG tablet Take 0.5 tablets (20 mg total) by mouth once daily. 45 tablet 1    betamethasone dipropionate (DIPROLENE) 0.05 % ointment Apply topically 2 (two) times daily. PRN for 10 days 45 g 0    blood sugar diagnostic Strp To check BG 4 times daily, to use with insurance preferred meter 200 each 6    blood sugar diagnostic Strp To check BG 4 times daily, to use with insurance preferred meter 200 strip 5    blood-glucose meter kit To check BG 4 times daily, to use with insurance preferred meter 1 each 1    cholecalciferol, vitamin D3, (VITAMIN D3) 25 mcg (1,000 unit)  capsule Take 1,000 Units by mouth once daily.      ergocalciferol (ERGOCALCIFEROL) 50,000 unit Cap Take 1 capsule (50,000 Units total) by mouth every 7 days. (Patient taking differently: Take 50,000 Units by mouth every 7 days.) 12 capsule 1    fish oil-omega-3 fatty acids 300-1,000 mg capsule Take 2 g by mouth once daily.      flash glucose sensor (FREESTYLE SHIN 2 SENSOR) Kit Apply to skin every 2 weeks (Patient not taking: Reported on 5/12/2022) 2 kit 11    lancets Misc To check BG 4 times daily, to use with insurance preferred meter 200 each 6    lancets Misc To check BG 4 times daily, to use with insurance preferred meter 200 each 5    levothyroxine (SYNTHROID) 112 MCG tablet Take 1 tablet (112 mcg total) by mouth before breakfast. 30 tablet 11    metFORMIN (GLUCOPHAGE-XR) 500 MG 24 hr tablet Take 2 tablets (1,000 mg total) by mouth 2 (two) times daily. 180 tablet 2    semaglutide (OZEMPIC) 0.25 mg or 0.5 mg(2 mg/1.5 mL) pen injector Inject 0.25 mg into the skin every 7 days for 28 days, THEN 0.5 mg every 7 days. 3 pen 1     No current facility-administered medications for this visit.       Past Medical History:   Diagnosis Date    Diabetes mellitus type II     Fibroids     Hypertension     Mixed hyperlipidemia     Thyroid cancer 01/01/2011    thyroid     Past Surgical History:   Procedure Laterality Date    BILATERAL SALPINGOOPHORECTOMY  10/28/13    HYSTERECTOMY Bilateral 10/28/2013    supracervical with BSO secondary to uterine fibroids    OOPHORECTOMY      SALPINGOOPHORECTOMY Bilateral 10/2013    2/2 uterine fibroids    supracervical hysterectomy  10/28/13    THYROID SURGERY      TUBAL LIGATION       Family History   Problem Relation Age of Onset    Thyroid disease Mother     Heart attack Father     Diabetes type II Father     Breast cancer Neg Hx     Colon cancer Neg Hx     Ovarian cancer Neg Hx      Social History     Tobacco Use    Smoking status: Former Smoker     Packs/day:  1.00     Years: 3.00     Pack years: 3.00     Types: Cigarettes     Quit date: 1993     Years since quittin.2    Smokeless tobacco: Never Used   Substance Use Topics    Alcohol use: Yes     Alcohol/week: 0.0 standard drinks     Comment: occasionally    Drug use: No        Review of Systems:  Review of Systems   Constitutional: Negative for appetite change, chills, fever and unexpected weight change.   HENT: Negative for facial swelling and trouble swallowing.    Eyes: Negative for visual disturbance.   Respiratory: Negative for chest tightness and shortness of breath.    Cardiovascular: Negative for chest pain and palpitations.   Gastrointestinal: Negative for abdominal pain, nausea and vomiting.   Skin: Negative for wound.   All other systems reviewed and are negative.      OBJECTIVE:     Vital Signs (Most Recent)  Temp: 98 °F (36.7 °C) (22)  Pulse: 64 (22)  BP: (!) 217/108 (22 104)  SpO2: 99 % (22)           Physical Exam:  Physical Exam  Vitals reviewed.   Constitutional:       Appearance: Normal appearance.   HENT:      Head: Normocephalic.   Eyes:      General: No scleral icterus.     Extraocular Movements: Extraocular movements intact.   Neck:      Comments: 6 cm soft, mobile, symmetric mass consistent with a lipoma over the left SCM. No skin changes. Sensation intact.  Cardiovascular:      Rate and Rhythm: Normal rate and regular rhythm.   Pulmonary:      Effort: Pulmonary effort is normal. No respiratory distress.   Abdominal:      General: Abdomen is flat.      Palpations: Abdomen is soft.      Tenderness: There is no abdominal tenderness.   Musculoskeletal:      Cervical back: Normal range of motion and neck supple.      Right lower leg: No edema.      Left lower leg: No edema.   Skin:     General: Skin is warm and dry.   Neurological:      General: No focal deficit present.      Mental Status: She is alert and oriented to person, place, and time.          Laboratory  Reviewed: A1c 9.1 2/2022    Diagnostic Results:  CT neck reviewed. Mass does appear to be a lipoma    ASSESSMENT/PLAN:     62 yo F with 6 cm lipoma over the left SCM. Will book for excision in minors    Rm Whitten MD  General Surgery PGY-2  05/12/2022

## 2022-05-12 NOTE — PATIENT INSTRUCTIONS
Increase metformin using the following schedule      Week 1 Take 1 pill with breakfast or morning snack and one pill with dinner (500  Mg twice a day)  Week 2 Take 2 pills with dinner, 1 with breakfast  Week 3 Take 2 pills with dinner, 2 with breakfast       -Start Ozempic 0.25 mg weekly  -Target 45-60 grams of carbohydrate with each meal      -Be compliant with Atorvastatin 80 mg      -Decreasing dose of your medication Synthyroid to 112 mcg daily      -Please pick all your medications from [facility] pharmacy in [place]

## 2022-05-12 NOTE — ASSESSMENT & PLAN NOTE
-Stage 1 papillary thyroid cancer s/p thyroidectomy and radioiodine in 2012   -History of elevated thyroglobulin and ultrasound with possible residual thyroid tissue but no lymphadenopathy   -Repeated thyroglobulin levels down trending, now at 2.5  -Will recommend to check neck ultrasound in 1 year  -TSH suppressed below goal of 0.5-2, will decrease dose of synthroid to 112 mcg daily and repeat blood work in 6 weeks

## 2022-05-12 NOTE — ASSESSMENT & PLAN NOTE
-No blood sugar data to review apart from recent hemoglobin A1c as she has not been checking home blood sugars, however, I suspect she continues to have hyperglycemia based on symptoms of polyuria and polydipsia.  -Will start her on Ozempic 0.25 mg weekly, patient instructed to pick it up from her pharmacy  -Provided instructions on how to increase her metformin dose to target of 1000 mg twice daily     -She will meet with Diabetes Education today.  Would need education of continuous glucose monitoring and carbohydrate counting  -Foot examination performed today shows some evidence of neuropathy with slight loss of sensations in her big toes bilaterally  -Nephropathy present, will increase her dose of Benazepril to 40 mg daily  -LDL above goal, will start taking atorvastatin 80 mg dailay  -Lifestyle modifications, healthy eating habits recommended  -Patient requested and provided blood sugar logs, to be brought to her next appointment with us

## 2022-05-13 ENCOUNTER — TELEPHONE (OUTPATIENT)
Dept: SURGERY | Facility: CLINIC | Age: 62
End: 2022-05-13
Payer: COMMERCIAL

## 2022-05-13 ENCOUNTER — CLINICAL SUPPORT (OUTPATIENT)
Dept: DIABETES | Facility: CLINIC | Age: 62
End: 2022-05-13
Payer: COMMERCIAL

## 2022-05-13 DIAGNOSIS — E11.65 UNCONTROLLED TYPE 2 DIABETES MELLITUS WITH HYPERGLYCEMIA, WITHOUT LONG-TERM CURRENT USE OF INSULIN: ICD-10-CM

## 2022-05-13 PROCEDURE — G0108 DIAB MANAGE TRN  PER INDIV: HCPCS | Mod: S$GLB,,, | Performed by: REGISTERED NURSE

## 2022-05-13 PROCEDURE — G0108 PR DIAB MANAGE TRN  PER INDIV: ICD-10-PCS | Mod: S$GLB,,, | Performed by: REGISTERED NURSE

## 2022-05-13 PROCEDURE — 99999 PR PBB SHADOW E&M-EST. PATIENT-LVL I: CPT | Mod: PBBFAC,,,

## 2022-05-13 PROCEDURE — 99999 PR PBB SHADOW E&M-EST. PATIENT-LVL I: ICD-10-PCS | Mod: PBBFAC,,,

## 2022-05-13 NOTE — TELEPHONE ENCOUNTER
Patient left message stating she may possibly want to reschedule her procedure with Dr. Navarrete. Left voicemail with direct callback number.

## 2022-05-16 ENCOUNTER — TELEPHONE (OUTPATIENT)
Dept: SURGERY | Facility: CLINIC | Age: 62
End: 2022-05-16
Payer: COMMERCIAL

## 2022-05-16 ENCOUNTER — TELEPHONE (OUTPATIENT)
Dept: PODIATRY | Facility: CLINIC | Age: 62
End: 2022-05-16
Payer: COMMERCIAL

## 2022-05-16 ENCOUNTER — TELEPHONE (OUTPATIENT)
Dept: ENDOCRINOLOGY | Facility: CLINIC | Age: 62
End: 2022-05-16
Payer: COMMERCIAL

## 2022-05-16 VITALS — WEIGHT: 223 LBS | BODY MASS INDEX: 32 KG/M2 | BODY MASS INDEX: 32 KG/M2 | WEIGHT: 223 LBS

## 2022-05-16 NOTE — TELEPHONE ENCOUNTER
----- Message from Frances Boswell sent at 5/14/2022  8:34 AM CDT -----  Regarding: eye glasses  Contact: pt  Pt requesting to speak w/ you in regards to Possibly leaving her Glasses in your office    Please call    [phone]

## 2022-05-16 NOTE — PROGRESS NOTES
Diabetes Care Specialist Progress Note  Author: Jason Shcmidt RN  Date: 5/16/2022    Program Intake  Reason for Diabetes Program Visit:: Initial Diabetes Assessment  Current diabetes risk level:: high  In the last 12 months, have you:: none  Permission to speak with others about care:: no    Lab Results   Component Value Date    HGBA1C 9.1 (H) 02/18/2022       Clinical              Clinical Assessment  Current Diabetes Treatment: Oral Medication, Diet, Exercise, Insulin  Have you ever experienced hypoglycemia (low blood sugar)?: no  Have you ever experienced hyperglycemia (high blood sugar)?: no    Medication Information  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  How many days a week do you miss your medications?: Never (not taking ozempic)  Do you use a pill box or medication chart to help you manage your medications?: Pill box  Do you sometimes have difficulty refilling your medications?: No  Medication adherence impacting ability to self-manage diabetes?: Yes    Labs  Do you have regular lab work to monitor your medications?: Yes  Type of Regular Lab Work: A1c, CBC, BMP  Where do you get your labs drawn?: [facility]  Lab Compliance Barriers: No    Nutritional Status  Diet: Regular  Meal Plan 24 Hour Recall: Breakfast, Lunch, Dinner, Snack  Meal Plan 24 Hour Recall - Breakfast: bologna sandwich  Meal Plan 24 Hour Recall - Lunch: lasagna  Meal Plan 24 Hour Recall - Dinner: jambalaya, white beans  Meal Plan 24 Hour Recall - Snack: pepperoni    drinks water powerade- unsure if sf  Change in appetite?: No  Recent Changes in Weight: No Recent Weight Change  Current nutritional status an area of need that is impacting patient's ability to self-manage diabetes?: Yes    Additional Social History    Support  Does anyone support you with your diabetes care?: yes  Who supports you?: family member  Who takes you to your medical appointments?: self  Does the current support meet the patient's needs?: Yes  Is Support  an area impacting ability to self-manage diabetes?: No    Access to Mass Media & Technology  Does the patient have access to any of the following devices or technologies?: Smart phone, Home computer, Internet Access  Media or technology needs impacting ability to self-manage diabetes?: No    Cognitive/Behavioral Health  Alert and Oriented: Yes  Difficulty Thinking: No  Requires Prompting: No  Requires assistance for routine expression?: No  Cognitive or behavioral barriers impacting ability to self-manage diabetes?: No    Culture/Mandaen  Culture or Holiness beliefs that may impact ability to access healthcare: No    Communication  Language preference: English  Hearing Problems: No  Vision Problems: No    Health Literacy  Preferred Learning Method: Face to Face, Demonstration, Reading Materials  How often do you need to have someone help you read instructions, pamphlets, or written material from your doctor or pharmacy?: Never  Health literacy needs impacting ability to self-manage diabetes?: No      Diabetes Self-Management Skills Assessment                        Home Blood Glucose Monitoring  Patient states that blood sugar is checked at home daily.: no  Reasons for not monitoring::  (has meter but does not test- does not want to stick finger- waiting on ron)  Home Blood Glucose Monitoring Skills Assessment Completed: : Yes  Assessment indicates:: Knowledge deficit, Instruction Needed  Area of need?: Yes    Acute Complications  Patient is able to identify types of acute complications: No  Acute Complications Skills Assessment Completed: : Yes  Assessment indicates:: Knowledge deficit, Instruction Needed  Area of need?: Yes    Chronic Complications  Patient can identify major chronic complications of diabetes.: no  Patient can identify ways to prevent or delay diabetes complications.: no  Patient is aware that having diabetes increases risk of heart disease?: No  Patient is aware that heart disease is the  leading cause of death and disability in people with diabetes?: No  Patient able to state risk factors for heart disease?: No  Patient is taking statin?: Yes  Chronic Complications Skills Assessment Completed: : Yes  Assessment indicates:: Knowledge deficit, Instruction Needed  Area of need?: Yes    Psychosocial/Coping  Patient can identify ways of coping with chronic disease.: yes  Patient-stated ways of coping with chronic disease:: support from loved ones  Psychosocial/Coping Skills Assessment Completed: : Yes  Assessment indicates:: Knowledge deficit, Instruction Needed  Area of need?: Yes      Diabetes Self Support Plan         Assessment Summary and Plan    Based on today's diabetes care assessment, the following areas of need were identified:      Social 5/12/2022   Support No   Access to Mass Media/Tech No   Cognitive/Behavioral Health No   Culture/Anglican No   Communication -   Health Literacy No        Clinical 5/12/2022   Medication Adherence Yes   Lab Compliance No   Nutritional Status Yes        Diabetes Self-Management Skills 5/12/2022   Diabetes Disease Process/Treatment Options Yes -- Provided Diabetes management guide and provided instruction regarding SS and consume increased amount of carbohydrate foods and risk factors of diabetes.Instructed patient on what is Diabetes and the progression of uncontrolled diabetes. Discussed qualifying parameters of diabetes diagnosis. Reviewed/Instructed on disease process. Discussed current treatment options, especially dietary and lifestyle changes as well as possible medication additions and/or changes. Patient verbalizes understanding of received information/education.       Nutrition/Healthy Eating Yes -- Emphasized importance of eliminating all Sugar Soda Beverages. Discussed Sugar Free drink options. Discussed carb vs non-carb foods and reviewed appropriate amounts of carbs to have at meals vs snacks. Recommended 30-45 gm carb at meals and 15-20 gm carb  at snacks. Instructed on appropriate label reading and serving sizes of specific carb containing foods. Reviewed need to limit total/saturated fats. Discussed meal plans and snack ideas amenable to pt. Reviewed the plate method. Patient verbalizes understanding of received information/education.        Physical Activity/Exercise Yes -- Discussed goals and benefits of regular Physical Activity. Reviewed difference between active lifestyle and structured physical activity. Encouraged Physical Activity with increased heart rate for sustained duration as tolerated. Reviewed Physical Activity goals of 4-5 times per week at 20-30 minutes weekly. Patient verbalizes understanding of received information/education.      Medication Yes -- Patient currently taking Metformin 500mg once a day instead of 2 times a day, and she is not taking the Ozempic  Discussed mechanism of action of oral diabetic medication:  Metformin, Ozempic. Reviewed signs and symptoms of hypoglycemia and treatment with Rule of 15. Discussed general vs severe hyperglycemia and risk of DKA. Patient verbalizes understanding of received information/education.        Home Blood Glucose Monitoring Yes ---- Provided patient with blood glucose logs, reviewed appropriate timing and frequency to SMBG, education on parameters on when to notify provider and advised patient to bring logs to all appts with PCP/Endocrinologist/Diabetes Care Specialist.Reviewed the importance of self-monitoring blood glucose and keeping logs.Instructed on how to self-monitor blood glucose using a home glucometer, how to properly dispose of used strips and lancets after use, and how to appropriately store meter and supplies.     Acute Complications Yes -- Discussed hypoglycemia vs hyperglycemia symptoms and discussed appropriate treatments for each. Reviewed that pt is at high risk of hypo with current medication regimen. Discussed general vs severe hyperglycemia and risk of DKA.      Chronic Complications Yes -- Provided Diabetes management guide and provided instruction regarding the disease progression if diabetes is uncontrolled. Reviewed ways to decrease risk of chronic complications by healthy eating and having regular activity. Maintaining optimal blood glucose control. Following up with Diabetic team which includes PCP, SHERIDAN MD (if applicable), yearly eye exam, yearly foot exam and Diabetes care specialist .Patient verbalizes understanding of received information/education.      Psychosocial/Coping Yes Discussed positive coping mechanisms to combat stress related to diagnosis of Diabetes. Support from family, friends or community members could be a helpful outlet to talk about feelings related to diabetes diagnosis. Provide patient with information on [facility]'s free Diabetes Support group- copy of schedule given to patient. Advise patient to seek medical attention immediately if feels depressed or has wishes to harm self. Patient verbalizes understanding of received information/education.               Today's interventions were provided through individual discussion, instruction, and written materials were provided.      Patient verbalized understanding of instruction and written materials.  Pt was able to return back demonstration of instructions today. Patient understood key points, needs reinforcement and further instruction.     Diabetes Self-Management Care Plan:    Today's Diabetes Self-Management Care Plan was developed with Rand's input. Rand has agreed to work toward the following goal(s) to improve his/her overall diabetes control.      There are no recently modified care plans to display for this patient.      Follow Up Plan     No follow-ups on file.    Today's care plan and follow up schedule was discussed with patient.  Rand verbalized understanding of the care plan, goals, and agrees to follow up plan.        The patient was encouraged to communicate with  his/her health care provider/physician and care team regarding his/her condition(s) and treatment.  I provided the patient with my contact information today and encouraged to contact me via phone or [facility] Patient Portal as needed.     Length of Visit   Total Time: 60 Minutes

## 2022-05-16 NOTE — TELEPHONE ENCOUNTER
----- Message from Frances Boswell sent at 5/14/2022  8:30 AM CDT -----  Regarding: Eye Glasses  Contact: pt  Pt requesting to speak w/ you in regards to Possibly leaving her Glasses in your office    Please call    [phone]

## 2022-05-16 NOTE — TELEPHONE ENCOUNTER
Attempted to contact patient re: her glasses.  They are not in Podiatry.  No answer, mailbox full.  No portal

## 2022-05-16 NOTE — PROGRESS NOTES
FREESTYLE SHIN 2 CGM TRAINING     Patient referred to Diabetes Management today for Freestyle Shin 2 continuous glucose sensor system training.   Patient arrived with his reader charged and 1 sensor for application or will be using their compatible smart phone.      Provided personal FreeStyle Shin 2 training - reviewed the following with patient:   · No fingersticks required but if feeling s/s that do not match with SG reading or in event of hypoglycemia confirm with fingerstick  · To receive alarms, reader must be within 20 feet  · Low and High alarms discussed  · Vitamin C (ascorbic acid) more than 500 mg can cause false readings  · Can bath, shower or swim  · Sensor is 14-day wear  · FDA approved for back of the arm wear only  · Sensor should be removed prior to exposing it to X-rays  · Site rotation  · 1 hour warm-up period  · Scan minimum every 5-8 hours for continual graph.   · Encouraged patient to scan more often - before each meal and before bed.      Explained in detail how the CGM works.  She was shown a training video on insertion.  Explained graphs and arrow directions to determine the direction of her blood sugar readings. A demo reader was used to explain how to place the  over the sensor to obtain readings. She was instructed to obtain readings by placing  the reader over the sensor. She will hear a beep indicating a reading has been made.     Pt successfully set up reader and placed sensor on back of upper left arm. Reviewed additional adhesive options if having any difficulty with sensor staying on.   Sensor no [id number].     If using smart device, instructed to go to "[employer] in phone and connect with [facility] #[id number] to enable sharing directly with clinic.    Customer support number was provided and instructed to call them for any additional help or questions.

## 2022-05-27 ENCOUNTER — TELEPHONE (OUTPATIENT)
Dept: ENDOCRINOLOGY | Facility: CLINIC | Age: 62
End: 2022-05-27
Payer: COMMERCIAL

## 2022-05-27 NOTE — TELEPHONE ENCOUNTER
----- Message from Nicole Tamayo sent at 5/27/2022 10:23 AM CDT -----  Regarding: Pt advice  Contact: @[phone]  Pt. Ms. Gordillo, and she is having issues with her censor asking, to speak with someone in the office.

## 2022-06-01 PROBLEM — Z90.711 STATUS POST LAPAROSCOPIC SUPRACERVICAL HYSTERECTOMY: Status: ACTIVE | Noted: 2022-06-01

## 2022-07-05 ENCOUNTER — LAB VISIT (OUTPATIENT)
Dept: LAB | Facility: HOSPITAL | Age: 62
End: 2022-07-05
Attending: STUDENT IN AN ORGANIZED HEALTH CARE EDUCATION/TRAINING PROGRAM
Payer: COMMERCIAL

## 2022-07-05 DIAGNOSIS — E11.65 UNCONTROLLED TYPE 2 DIABETES MELLITUS WITH HYPERGLYCEMIA, WITHOUT LONG-TERM CURRENT USE OF INSULIN: ICD-10-CM

## 2022-07-05 DIAGNOSIS — E89.0 POSTOPERATIVE HYPOTHYROIDISM: ICD-10-CM

## 2022-07-05 LAB
ESTIMATED AVG GLUCOSE: 226 MG/DL (ref 68–131)
HBA1C MFR BLD: 9.5 % (ref 4–5.6)
T4 FREE SERPL-MCNC: 1.07 NG/DL (ref 0.71–1.51)
TSH SERPL DL<=0.005 MIU/L-ACNC: 1.59 UIU/ML (ref 0.4–4)

## 2022-07-05 PROCEDURE — 84439 ASSAY OF FREE THYROXINE: CPT | Performed by: STUDENT IN AN ORGANIZED HEALTH CARE EDUCATION/TRAINING PROGRAM

## 2022-07-05 PROCEDURE — 84443 ASSAY THYROID STIM HORMONE: CPT | Performed by: STUDENT IN AN ORGANIZED HEALTH CARE EDUCATION/TRAINING PROGRAM

## 2022-07-05 PROCEDURE — 36415 COLL VENOUS BLD VENIPUNCTURE: CPT | Performed by: STUDENT IN AN ORGANIZED HEALTH CARE EDUCATION/TRAINING PROGRAM

## 2022-07-05 PROCEDURE — 83036 HEMOGLOBIN GLYCOSYLATED A1C: CPT | Performed by: STUDENT IN AN ORGANIZED HEALTH CARE EDUCATION/TRAINING PROGRAM

## 2022-07-07 ENCOUNTER — PROCEDURE VISIT (OUTPATIENT)
Dept: SURGERY | Facility: CLINIC | Age: 62
End: 2022-07-07
Payer: COMMERCIAL

## 2022-07-07 VITALS
SYSTOLIC BLOOD PRESSURE: 238 MMHG | DIASTOLIC BLOOD PRESSURE: 98 MMHG | OXYGEN SATURATION: 98 % | BODY MASS INDEX: 32.26 KG/M2 | HEIGHT: 70 IN | WEIGHT: 225.31 LBS | HEART RATE: 64 BPM

## 2022-07-07 DIAGNOSIS — R22.1 MASS OF LEFT SIDE OF NECK: Primary | ICD-10-CM

## 2022-07-07 PROCEDURE — 11424 EXC, TUMOR SOFT TISSUE: ICD-10-PCS | Mod: S$GLB,,, | Performed by: STUDENT IN AN ORGANIZED HEALTH CARE EDUCATION/TRAINING PROGRAM

## 2022-07-07 PROCEDURE — 88307 TISSUE EXAM BY PATHOLOGIST: CPT | Mod: 26,,, | Performed by: PATHOLOGY

## 2022-07-07 PROCEDURE — 11424 EXC H-F-NK-SP B9+MARG 3.1-4: CPT | Mod: S$GLB,,, | Performed by: STUDENT IN AN ORGANIZED HEALTH CARE EDUCATION/TRAINING PROGRAM

## 2022-07-07 PROCEDURE — 88307 TISSUE EXAM BY PATHOLOGIST: CPT | Performed by: PATHOLOGY

## 2022-07-07 PROCEDURE — 88307 PR  SURG PATH,LEVEL V: ICD-10-PCS | Mod: 26,,, | Performed by: PATHOLOGY

## 2022-07-07 PROCEDURE — 12042 PR LAYR CLOS WND REST BODY 2.6-7.5 CM: ICD-10-PCS | Mod: S$GLB,,, | Performed by: STUDENT IN AN ORGANIZED HEALTH CARE EDUCATION/TRAINING PROGRAM

## 2022-07-07 PROCEDURE — 12042 INTMD RPR N-HF/GENIT2.6-7.5: CPT | Mod: S$GLB,,, | Performed by: STUDENT IN AN ORGANIZED HEALTH CARE EDUCATION/TRAINING PROGRAM

## 2022-07-07 NOTE — PROCEDURES
"Rand Kaye is a 61 y.o. female patient.    Pulse: 64 (07/07/22 1003)  BP: (!) 238/98 (07/07/22 1003)  SpO2: 98 % (07/07/22 1003)  Weight: 102.2 kg (225 lb 5 oz) (07/07/22 1003)  Height: 5' 10" (177.8 cm) (07/07/22 1003)       Exc, Tumor Soft Tissue    Date/Time: 7/7/2022 9:45 AM  Performed by: Davida Vega MD  Authorized by: Ej Navarrete MD     Consent Done?:  Yes (Written)  Timeout: prior to procedure the correct patient, procedure, and site was verified    Prep: patient was prepped and draped in usual sterile fashion    Local anesthesia used?: Yes    Anesthesia:  Local infiltration  Local anesthetic:  Lidocaine 1% with epinephrine  Anesthetic total (ml):  15  Assistants?: Yes    Indications:  Lipoma  Body area:  Neck / anterior thorax  Laterality:  Left  Position:  Lateral   Patient was prepped and draped in the normal sterile fashion.  Anesthesia:  Local infiltration  Local anesthetic:  Lidocaine 1% with epinephrine  Excision type:  Skin  Malignancy:  Benign  Excision size (cm):  4  Scalpel size:  15  Incision type:  Single straight  Specimens?: Yes     Specimens submitted to pathology.   Hemostasis was obtained.  Estimated blood loss (cc):  1  Wound closure:  Intermediate layered  Wound repair size (cm):  3  Sutures:  4-0 Monocryl and 3-0 Vicryl  Sterile dressings:  Other (comments)  Post-op diagnosis:  Same as pre-op diagnosis.   Needle, instrument, and sponge counts were correct.   Patient tolerated the procedure well with no immediate complications.   Post-operative instructions were provided for the patient.  Comments:      dermabond  No follow up needed      "

## 2022-07-15 LAB
FINAL PATHOLOGIC DIAGNOSIS: NORMAL
Lab: NORMAL

## 2022-07-28 ENCOUNTER — PATIENT MESSAGE (OUTPATIENT)
Dept: SURGERY | Facility: CLINIC | Age: 62
End: 2022-07-28
Payer: COMMERCIAL

## 2022-07-28 ENCOUNTER — TELEPHONE (OUTPATIENT)
Dept: SURGERY | Facility: CLINIC | Age: 62
End: 2022-07-28
Payer: COMMERCIAL

## 2022-08-09 ENCOUNTER — TELEPHONE (OUTPATIENT)
Dept: ENDOCRINOLOGY | Facility: CLINIC | Age: 62
End: 2022-08-09
Payer: COMMERCIAL

## 2022-08-09 NOTE — TELEPHONE ENCOUNTER
----- Message from Luisa Tamayo sent at 8/9/2022  1:40 PM CDT -----  Regarding: reschduling appt  Contact: pt @ [phone]  Pt is requesting a call back regarding rescheduling appt 8/16/2022 to another date pt says she cannot miss work that day attempt to schedule earliest is 12/15 . Please call to discuss further      Eucrisa Pregnancy And Lactation Text: This medication has not been assigned a Pregnancy Risk Category but animal studies failed to show danger with the topical medication. It is unknown if the medication is excreted in breast milk.

## 2022-11-03 DIAGNOSIS — Z00.00 ROUTINE GENERAL MEDICAL EXAMINATION AT A HEALTH CARE FACILITY: Primary | ICD-10-CM

## 2023-01-23 NOTE — PROGRESS NOTES
Subjective:      Chief Complaint: post surgical hypothyroidism and diabetes mellitus type 2    HPI: Rand Kaye is a 62 y.o. female who is here for a follow up evaluation of papillary thyroid cancer and diabetes mellitus type 2  Previous patient of Dr. Whitten. Last visit in May 2022.    She was lost to follow up    Regarding her history of thyroid cancer:  -She had thyroidectomy in 2012 for multi-nodular goiter with compressive symptoms. Incidentally found to have a 1 cm papillary thyroid cancer in left lobe after surgery.   She received 30 mCi iodine after surgery, but did not show for WBS.     -On Synthyroid 112 mcg daily  Denies   She is taking levothyroxine on an empty stomach and wait 30-45 minutes before eating, drinking, or taking other medications.     -Thyroglobulin previously 8.8, but most recent was 2.5 in February 2022     Latest Reference Range & Units 09/13/16 08:02 03/06/18 19:08 08/03/18 16:20 02/18/22 14:41   TSH 0.400 - 4.000 uIU/mL 4.916 (H) 4.620 (H) 0.814 0.150 (L)   Free T4 0.71 - 1.51 ng/dL 0.87 0.65 (L) 1.08 1.20   Thyroglobulin Antibody Screen <1.8 IU/mL <1.8  <1.8 <1.8   Thyroglobulin, Tumor Marker ng/mL 8.6 (H) [4]  2.7 (H) [5] 2.5 (H) [6]     Lab Results   Component Value Date    TSH 1.593 07/05/2022    FREET4 1.07 07/05/2022     Ultrasound Neck   4/2022  -Status post thyroidectomy.  -No sonographic evidence of malignancy.  -Recommend to continue correlation with thyroglobulin levels and repeat neck US in 1 year to follow the 0.5 cm hypoechoic focus in the right inferior thyroid bed and the 1.5 cm hypoechoic focus in the left middle thyroid bed.    8/2018   There is a 5 cm hypoechoic subcutaneous structure at the left lateral neck.  There is additional cystic structure at the thyroid surgical bed.  These findings are not specific yet not well characterized with ultrasound.  While this may be merely a lipoma, in light of the patient's history of papillary thyroid carcinoma, contrast  enhanced CT or MRI of the neck would be recommended as the appearance is nonspecific.     CT neck   9/2018  1.2 cm enhancing structure in the left thyroid resection bed.  Findings are nonspecific and may represent residual thyroid tissue or vascular structure, however in this patient with a history of papillary thyroid carcinoma continued surveillance is recommended.     Pathology      Now regarding Post-Surgical Hypothyroidism  -Patient presents for evaluation of thyroid function. She had thyroidectomy in 2012 for multi-nodular goiter with compressive symptoms. Incidentally found to have a 1 cm papillary thyroid cancer in left lobe after surgery. She received 30 mCi iodine after surgery, but did not show for WBS.   -Current medication is levothyroxine 112 mcg daily    -Current symptoms:         No   Yes    [x]    []   Weight gain    [x]    []   Fatigue    [x]    []   Constipation    []    [x]   Hair loss    [x]    []   Brittle nails    []    [x]   Mental fog    [x]    []   Cold intolerance    [x]    []   Memory impair    [x]    []   Muscle weakness    []    [x]   Neck swelling    [x]    []   Hoarseness    [x]    []   Periorbital edema    [x]    []   Lithium or Amiodarone use    [x]    []   Recent severe illness      Sometimes having diarrhea that she attributes to metformin      Lab Results   Component Value Date    TSH 1.593 07/05/2022    TSH 0.150 (L) 02/18/2022    TSH 0.814 08/03/2018    FREET4 1.07 07/05/2022    FREET4 1.20 02/18/2022    FREET4 1.08 08/03/2018    THYROPEROXID < 6.0 01/23/2012     Now Regarding diabetes:  -Patient was initially diagnosed with Type 2 diabetes mellitus in 2009  -When questioned about pertinent symptoms, denies any polyuria, polydipsia, nocturia, nausea vomiting, abdominal discomfort, numbness/tingling, visual change, weight change   Denies DKA  -Diabetic complications present  She is due for eye exam; has not seen in 2018  Neuropathy     :  tolerable  Nephropathy   : yes; has seen  in the past but not in awhile  CAD: denies    Diabetes History in Family  Father had Type 2 Diabetes Mellitus    -Denies history of pancreatitis, medullary thyroid cancer, recurrent UTI or recurrent fungal infection     Current diabetic medications include:   Metformin 500 mg daily (tries to increase and has severe diarrhea)  Ozempic was prescribed at last visit but reports she did not receive    Does not remember being on insulin  States she is  and cannot start insulin    Blood Sugar Range   Does not check her blood sugar with finger sticks at home  Has a CGM device -ron but does not know how to apply  Will apply today in clinic  BG in clinic is 294    Hypoglycemic Episodes:  Denies it    Medication Compliance  -Patient has a history of poor compliance    Diet  Meals are: She is bringing her food to work but knows she could do better  Breakfast: sandwich, coffee and acosta sometimes  Lunch: fried chicken, rice  Dinner: rice, beans, pork    Eating 3 meals per day  Drinking diet coke, powerade, water and lemon tea     Does patient count carbohydrates? no    Exercise:  Walks, Drive Trucks    Screening for Complications:    Dyslipidemia   Statin: Taking Atorvastatin 20 mg daily, LDL above goal at 145    Nephropathy  ACE/ARB: Taking Benazepril 20 mg daily    Lab Results   Component Value Date    HGBA1C 9.5 (H) 07/05/2022    HGBA1C 9.1 (H) 02/18/2022    HGBA1C 10.1 (H) 08/03/2018     Diabetes Management Status  Screening or Prevention Patient's value Goal Complete/Controlled?   HgA1C Testing and Control   Lab Results   Component Value Date    HGBA1C 9.5 (H) 07/05/2022      Annually/Less than 8% No   Lipid profile : 02/18/2022 Annually Yes   LDL control Lab Results   Component Value Date    LDLCALC 145.6 02/18/2022    Annually/Less than 100 mg/dl  No   Nephropathy screening Lab Results   Component Value Date    LABMICR 400.0 02/18/2022     Lab Results   Component Value Date    PROTEINUA Negative 04/19/2012     Annually Yes   Blood pressure BP Readings from Last 1 Encounters:   01/26/23 130/78    Less than 140/90 No   Dilated retinal exam : 09/24/2018 Annually No   Foot exam   : 01/26/2023 Annually No     Review of Systems   Constitutional:  Negative for fatigue and unexpected weight change.   Eyes:  Positive for visual disturbance (on awakening).   Endocrine: Negative for polydipsia, polyphagia and polyuria.  as above    Objective:     Vitals:    01/26/23 1307   BP: 130/78   Pulse: (!) 53       BP Readings from Last 5 Encounters:   01/26/23 130/78   07/07/22 (!) 238/98   06/01/22 (!) 189/88   05/12/22 (!) 164/74   05/12/22 (!) 217/108     Physical Exam  Constitutional:       Appearance: She is obese.   Neck:      Comments: No thyroid tissue  Pulmonary:      Effort: Pulmonary effort is normal.   Neurological:      Mental Status: She is alert.   Diabetes Foot Exam:   No sores or lesions to bilateral feet  Shoes appropriate  sensation intact to vibration and monofilament    Wt Readings from Last 10 Encounters:   01/26/23 1307 99.8 kg (220 lb 0.3 oz)   07/07/22 1003 102.2 kg (225 lb 5 oz)   06/01/22 1430 103.5 kg (228 lb 2.8 oz)   05/13/22 0730 101.2 kg (223 lb)   05/12/22 1406 101.2 kg (223 lb 1.7 oz)   05/12/22 1300 101.2 kg (223 lb)   05/12/22 1047 101.2 kg (223 lb 3.5 oz)   05/12/22 0924 101.4 kg (223 lb 8.7 oz)   04/27/22 1359 101.7 kg (224 lb 3.3 oz)   02/18/22 1329 100.7 kg (222 lb 0.1 oz)       Lab Results   Component Value Date    HGBA1C 9.5 (H) 07/05/2022     Lab Results   Component Value Date    CHOL 250 (H) 02/18/2022    HDL 73 02/18/2022    LDLCALC 145.6 02/18/2022    TRIG 157 (H) 02/18/2022    CHOLHDL 29.2 02/18/2022     Lab Results   Component Value Date     02/18/2022    K 3.9 02/18/2022     02/18/2022    CO2 31 (H) 02/18/2022     (H) 02/18/2022    BUN 10 02/18/2022    CREATININE 0.7 02/18/2022    CALCIUM 10.5 02/18/2022    PROT 7.6 02/18/2022    ALBUMIN 4.0 02/18/2022    BILITOT 0.5  02/18/2022    ALKPHOS 61 02/18/2022    AST 18 02/18/2022    ALT 21 02/18/2022    ANIONGAP 10 02/18/2022    ESTGFRAFRICA >60.0 02/18/2022    EGFRNONAA >60.0 02/18/2022    TSH 1.593 07/05/2022      Lab Results   Component Value Date    MICALBCREAT 384.6 (H) 02/18/2022       Assessment/Plan:     1. Uncontrolled type 2 diabetes mellitus with hyperglycemia, without long-term current use of insulin  Hemoglobin A1C    Lipid Panel    Comprehensive Metabolic Panel    Vitamin D    Microalbumin/Creatinine Ratio, Urine    Thyroglobulin    Ambulatory referral/consult to Optometry    Ambulatory referral/consult to Diabetes Education    tirzepatide (MOUNJARO) 2.5 mg/0.5 mL PnIj    glimepiride (AMARYL) 2 MG tablet    flash glucose sensor (FREESTYLE SHIN 2 SENSOR) Kit      2. History of papillary adenocarcinoma of thyroid  TSH    Thyroglobulin    US Soft Tissue Head Neck Thyroid      3. Postoperative hypothyroidism  US Soft Tissue Head Neck Thyroid      4. Mixed hyperlipidemia        5. Essential hypertension          Uncontrolled type 2 diabetes mellitus with hyperglycemia, without long-term current use of insulin  -- Reviewed goals of therapy are to get the best control we can without hypoglycemia  -- Refer to diabetes education  No logs or labs for review  Applied her shin today in clinic with   Medication changes:   A1c likely above goal   Increase Metformin 500 mg twice daily  Start Mounjaro 2.5 mg weekly for 4 weeks. We will re-evaluate your blood sugars in 4 weeks and determine need for increased dose. Return demonstration of how to use pen done in office today. Discussed one pen can stay out of the refrigerator for 21 days. Please take pen out of the refrigerator 10 minutes prior to injection.   Call [phone] for access to savings plan.    Start glimepiride 2 mg daily  Do not take high doses of vitamin C    Goal A1c today is <7.5% but eventual <6.5% without hypoglycemia    -- Reviewed patient's current insulin  regimen. Clarified proper insulin dose and timing in relation to meals, etc. Insulin injection sites and proper rotation instructed.    -- Advised frequent self blood glucose monitoring.  Patient encouraged to document glucose results and bring them to every clinic visit    -- Hypoglycemia precautions discussed. Instructed on precautions before driving.    -- Call for Bg repeatedly < 90 or > 180.   -- Close adherence to lifestyle changes recommended.   -- Periodic follow ups for eye evaluations, foot care and dental care suggested.      History of papillary adenocarcinoma of thyroid  -Stage 1 papillary thyroid cancer s/p thyroidectomy and radioiodine in 2012   -History of elevated thyroglobulin and ultrasound with possible residual thyroid tissue but no lymphadenopathy   -Repeated thyroglobulin levels down trending, now at 2.5  -Will recommend to check neck ultrasound in 1 year      Postoperative hypothyroidism  - Continue synthroid 112 mcg daily  - Will repeat TSH and target a goal of 0.5-2    Mixed hyperlipidemia  On statin   Goal LDL <70  Check LP    Essential hypertension  On ACEI    Follow up in about 4 weeks (around 2/23/2023).

## 2023-01-25 NOTE — ASSESSMENT & PLAN NOTE
-- Reviewed goals of therapy are to get the best control we can without hypoglycemia  -- Refer to diabetes education  No logs or labs for review  Applied her ron today in clinic with   Medication changes:   A1c likely above goal   Increase Metformin 500 mg twice daily  Start Mounjaro 2.5 mg weekly for 4 weeks. We will re-evaluate your blood sugars in 4 weeks and determine need for increased dose. Return demonstration of how to use pen done in office today. Discussed one pen can stay out of the refrigerator for 21 days. Please take pen out of the refrigerator 10 minutes prior to injection.   Call [phone] for access to savings plan.    Start glimepiride 2 mg daily  Do not take high doses of vitamin C    Goal A1c today is <7.5% but eventual <6.5% without hypoglycemia    -- Reviewed patient's current insulin regimen. Clarified proper insulin dose and timing in relation to meals, etc. Insulin injection sites and proper rotation instructed.    -- Advised frequent self blood glucose monitoring.  Patient encouraged to document glucose results and bring them to every clinic visit    -- Hypoglycemia precautions discussed. Instructed on precautions before driving.    -- Call for Bg repeatedly < 90 or > 180.   -- Close adherence to lifestyle changes recommended.   -- Periodic follow ups for eye evaluations, foot care and dental care suggested.

## 2023-01-25 NOTE — ASSESSMENT & PLAN NOTE
-Stage 1 papillary thyroid cancer s/p thyroidectomy and radioiodine in 2012   -History of elevated thyroglobulin and ultrasound with possible residual thyroid tissue but no lymphadenopathy   -Repeated thyroglobulin levels down trending, now at 2.5  -Will recommend to check neck ultrasound in 1 year

## 2023-01-26 ENCOUNTER — OFFICE VISIT (OUTPATIENT)
Dept: ENDOCRINOLOGY | Facility: CLINIC | Age: 63
End: 2023-01-26
Payer: COMMERCIAL

## 2023-01-26 VITALS
BODY MASS INDEX: 31.5 KG/M2 | DIASTOLIC BLOOD PRESSURE: 78 MMHG | OXYGEN SATURATION: 97 % | WEIGHT: 220 LBS | HEART RATE: 53 BPM | HEIGHT: 70 IN | SYSTOLIC BLOOD PRESSURE: 130 MMHG

## 2023-01-26 DIAGNOSIS — E11.65 UNCONTROLLED TYPE 2 DIABETES MELLITUS WITH HYPERGLYCEMIA, WITHOUT LONG-TERM CURRENT USE OF INSULIN: Chronic | ICD-10-CM

## 2023-01-26 DIAGNOSIS — E78.2 MIXED HYPERLIPIDEMIA: ICD-10-CM

## 2023-01-26 DIAGNOSIS — E89.0 POSTOPERATIVE HYPOTHYROIDISM: ICD-10-CM

## 2023-01-26 DIAGNOSIS — Z85.850 HISTORY OF PAPILLARY ADENOCARCINOMA OF THYROID: ICD-10-CM

## 2023-01-26 DIAGNOSIS — I10 ESSENTIAL HYPERTENSION: ICD-10-CM

## 2023-01-26 PROCEDURE — 3078F PR MOST RECENT DIASTOLIC BLOOD PRESSURE < 80 MM HG: ICD-10-PCS | Mod: CPTII,S$GLB,, | Performed by: NURSE PRACTITIONER

## 2023-01-26 PROCEDURE — 1160F RVW MEDS BY RX/DR IN RCRD: CPT | Mod: CPTII,S$GLB,, | Performed by: NURSE PRACTITIONER

## 2023-01-26 PROCEDURE — 3008F PR BODY MASS INDEX (BMI) DOCUMENTED: ICD-10-PCS | Mod: CPTII,S$GLB,, | Performed by: NURSE PRACTITIONER

## 2023-01-26 PROCEDURE — 4010F PR ACE/ARB THEARPY RXD/TAKEN: ICD-10-PCS | Mod: CPTII,S$GLB,, | Performed by: NURSE PRACTITIONER

## 2023-01-26 PROCEDURE — 99214 OFFICE O/P EST MOD 30 MIN: CPT | Mod: S$GLB,,, | Performed by: NURSE PRACTITIONER

## 2023-01-26 PROCEDURE — 1159F MED LIST DOCD IN RCRD: CPT | Mod: CPTII,S$GLB,, | Performed by: NURSE PRACTITIONER

## 2023-01-26 PROCEDURE — 3078F DIAST BP <80 MM HG: CPT | Mod: CPTII,S$GLB,, | Performed by: NURSE PRACTITIONER

## 2023-01-26 PROCEDURE — 3075F SYST BP GE 130 - 139MM HG: CPT | Mod: CPTII,S$GLB,, | Performed by: NURSE PRACTITIONER

## 2023-01-26 PROCEDURE — 3008F BODY MASS INDEX DOCD: CPT | Mod: CPTII,S$GLB,, | Performed by: NURSE PRACTITIONER

## 2023-01-26 PROCEDURE — 1160F PR REVIEW ALL MEDS BY PRESCRIBER/CLIN PHARMACIST DOCUMENTED: ICD-10-PCS | Mod: CPTII,S$GLB,, | Performed by: NURSE PRACTITIONER

## 2023-01-26 PROCEDURE — 3075F PR MOST RECENT SYSTOLIC BLOOD PRESS GE 130-139MM HG: ICD-10-PCS | Mod: CPTII,S$GLB,, | Performed by: NURSE PRACTITIONER

## 2023-01-26 PROCEDURE — 4010F ACE/ARB THERAPY RXD/TAKEN: CPT | Mod: CPTII,S$GLB,, | Performed by: NURSE PRACTITIONER

## 2023-01-26 PROCEDURE — 99999 PR PBB SHADOW E&M-EST. PATIENT-LVL V: ICD-10-PCS | Mod: PBBFAC,,, | Performed by: NURSE PRACTITIONER

## 2023-01-26 PROCEDURE — 99999 PR PBB SHADOW E&M-EST. PATIENT-LVL V: CPT | Mod: PBBFAC,,, | Performed by: NURSE PRACTITIONER

## 2023-01-26 PROCEDURE — 1159F PR MEDICATION LIST DOCUMENTED IN MEDICAL RECORD: ICD-10-PCS | Mod: CPTII,S$GLB,, | Performed by: NURSE PRACTITIONER

## 2023-01-26 PROCEDURE — 99214 PR OFFICE/OUTPT VISIT, EST, LEVL IV, 30-39 MIN: ICD-10-PCS | Mod: S$GLB,,, | Performed by: NURSE PRACTITIONER

## 2023-01-26 RX ORDER — TIRZEPATIDE 2.5 MG/.5ML
2.5 INJECTION, SOLUTION SUBCUTANEOUS
Qty: 4 PEN | Refills: 0 | Status: SHIPPED | OUTPATIENT
Start: 2023-01-26 | End: 2023-02-23

## 2023-01-26 RX ORDER — AMOXICILLIN 500 MG/1
500 CAPSULE ORAL 3 TIMES DAILY
COMMUNITY
Start: 2023-01-11 | End: 2023-12-19

## 2023-01-26 RX ORDER — FLASH GLUCOSE SENSOR
KIT MISCELLANEOUS
Qty: 2 KIT | Refills: 11 | Status: SHIPPED | OUTPATIENT
Start: 2023-01-26 | End: 2023-01-26 | Stop reason: SDUPTHER

## 2023-01-26 RX ORDER — TRIAMCINOLONE ACETONIDE 1 MG/G
CREAM TOPICAL
COMMUNITY
Start: 2022-10-31

## 2023-01-26 RX ORDER — PROMETHAZINE HYDROCHLORIDE 6.25 MG/5ML
5 SYRUP ORAL 2 TIMES DAILY
COMMUNITY
Start: 2023-01-11 | End: 2023-12-19

## 2023-01-26 RX ORDER — GLIMEPIRIDE 2 MG/1
2 TABLET ORAL
Qty: 30 TABLET | Refills: 6 | Status: SHIPPED | OUTPATIENT
Start: 2023-01-26 | End: 2023-03-09

## 2023-01-26 RX ORDER — FLASH GLUCOSE SENSOR
KIT MISCELLANEOUS
Qty: 2 KIT | Refills: 11 | Status: SHIPPED | OUTPATIENT
Start: 2023-01-26 | End: 2023-08-17

## 2023-01-26 RX ORDER — BENAZEPRIL HYDROCHLORIDE 20 MG/1
20 TABLET ORAL
COMMUNITY
Start: 2022-12-22 | End: 2023-07-17 | Stop reason: SDUPTHER

## 2023-01-26 NOTE — PATIENT INSTRUCTIONS
[phone]   Dr Rick Russ    Thyroid Cancer   Continue Levothyroxine 112 mcg daily   Check labs    Diabetes    A1c above goal  Increase Metformin 500 mg twice daily  Start Mounjaro 2.5 mg weekly for 4 weeks. We will re-evaluate your blood sugars in 4 weeks and determine need for increased dose. Return demonstration of how to use pen done in office today. Discussed one pen can stay out of the refrigerator for 21 days. Please take pen out of the refrigerator 10 minutes prior to injection.   Call [phone] for access to savings plan.    Start glimepiride 2 mg daily  Do not take high doses of vitamin C    Goal A1c today is <7.5% but eventual <6.5% without hypoglycemia    Goal blood sugar is  fasting   Goal blood sugar 1 hour after meal is less than 180  Goal blood sugar 2 hour after meal is less than 140    Eating the Right Number of Calories (9574-1443 Guidelines)    Calories are a measure of the energy you get from food. If you eat more calories than you use, you will gain weight. If you eat fewer calories than you use, you will lose weight. Below are tables that give the number of calories needed each day. Look for your gender, age, and activity level. If you stick to this number, you should neither gain nor lose weight. Note that this is an estimated number of calories.* Your exact number may differ.    Women  Age in years Low activity level (calories/day) Moderate activity level (calories/day) High activity level (calories/day)   19 to 30 1,800-2,000 2,000-2,200 2,400   31 to 50 1,800 2,000 2,200   51 and older 1,600 1,800 2,000-2,200      Men  Age in years Low activity level  (calories/day) Moderate activity level (calories/day) High activity level (calories/day)   19 to 30 2,400-2,600 2,600-2,800 3,000   31 to 50 2,200-2,400 2,400-2,600 2,800-3,000   51 and older 2,000-2,200 2,200-2,400 2,400-2,800     Activity levels defined  Low. Only light physical activity such as that done during typical  daily life.  Moderate. Light physical activity done during typical daily life AND physical activity equal to walking about 1.5 to 3 miles a day at 3 to 4 miles per hour.  High. Light physical activity done during typical daily life AND physical activity equal to walking more than 3 miles a day at 3 to 4 miles per hour.  *From Dietary Guidelines for Americans, 2935-5962, [place] Department of Health and Human Services.    Eat less fat  A gram of fat has almost 2.5 times the calories of a gram of protein or carbohydrates. Try to balance your food choices so that only 20% to 35% of your calories comes from total fat. This means an average of 2½ to 3½ grams of fat for each 100 calories you eat.    Eat more fiber  High-fiber foods are digested more slowly than low-fiber foods, so you feel full longer. Try to get at least 25 grams of fiber each day for a 2000 calorie diet. Foods high in fiber include:  Vegetables and fruits  Whole-grain or bran breads, pastas, and cereals  Legumes (beans) and peas  As you begin to eat more fiber, be sure to drink plenty of water to keep your digestive system working smoothly.    Tips  Do's and don'ts include:   Dont skip meals. This often leads to overeating later on. Its best to spread your eating throughout the day.  Eat a variety of foods, not just a few favorites.  If you find yourself eating when youre not hungry, ask yourself why. Many of us eat when were bored, stressed, or just to be polite. Listen to your body. If youre not hungry, get busy doing something else instead of eating.  Eat slower, shooting for 20 to 30 minutes for each meal. It takes 20 minutes for your stomach to tell your brain that its full. Slow eaters tend to eat less and are still satisfied, while fast eaters may tend to be overeaters.   Pay attention to what you eat. Dont read or watch TV during your meal.      ---------------------------------------------------------------------------------------------------------------------------------------------------    Losing Weight for Heart Health  Excess weight is a major risk factor for heart disease. Losing weight has many benefits including lowering your blood pressure, improving your cholesterol level, and decreasing your risk for diseases such as diabetes and heart disease. It may help keep your arteries open so that your heart can get the oxygen-rich blood it needs. All in all, losing weight makes you healthier.       Exercise with a friend. When activity is fun, you're more likely to stick with it.     Calories and weight loss  Calories are the fuel your body burns for energy. You get the calories you need from the food you eat. For healthy weight loss, women should eat at least 1,200 calories a day, men at least 1,500.  When you eat more calories than you need, your body stores the extra calories as fat. One pound of fat equals 3,500 calories.  To lose weight, try to reduce your total calorie intake by 500 calories. To do this, eat 250 calories less each day. Add activity to burn the other 250 calories. Walking 2.5 miles burns about 250 calories. Other more intense activities can burn more calories in the time you spend doing them, such as swimming and running. It is important to understand that reducing calorie intake is much more effective at weight loss than is exercise.  Eat a variety of healthy foods to get the nutrients you need.    Tips for losing weight  Drink 8 to 10 glasses of water a day.  Dont skip meals. Instead, eat smaller portions.  Eat your meals earlier in the day.  Cut out sugary drinks such as soda and fruit juices.  Make your later meals lighter than your earlier meals.     What can exercise help?  Blood sugar. Regular exercise improves blood sugar control by helping your body use insulin.  Mental and emotional health. Physical activity relieves  stress and helps you sleep better.  Heart health. With regular exercise, you can reduce your risk of heart disease and high blood pressure. You can also improve your cholesterol and triglyceride levels.  Weight. Exercise helps you lose fat, gain muscle, and control your weight.  Health of blood vessels and nerves. Activity helps lower blood sugar. This helps prevent damage to blood vessels and nerves that can cause problems with your brain, eyes, feet, and legs.  Finances. If you manage your blood sugar, you may spend less on medical care.    2 types of exercise  Two types of exercise help your body use blood sugar. Experts advise both types of exercise for people with diabetes:  Aerobic exercise. This is a rhythmic, repeated, continued movement of large muscle groups for at least 10 minutes at a time. You should do this about 30 minutes a day on most days of the week. Examples include walking, bicycling, jogging, swimming, water aerobics, and many sports.  Resistance exercise (strength training). This type of exercise uses muscles to move weight or work against resistance. You can do it with free weights, machines, resistance tubing, or your own body weight. Adults with diabetes should aim for 2 to 3 sessions of resistance exercise each week. Its best to skip a day in between.    A goal to shoot for  Your main goal is to become more active. Even a little bit helps. Choose an activity that you like. Walking is one great form of exercise that everyone can do. Talk to your healthcare provider about any limits you may have before starting with an exercise program. Then aim for 150 minutes a week of physical activity. Dont let more than 2 days go by without exercise. When you are sitting for long periods of time, get up for short sessions of light activity every 30 minutes.    Getting activity into your day  Being more active doesnt have to be hard work. Try these to get more activity into your day:  Take the stairs  instead of the elevator  Garden, do housework, and yard work  Choose a parking space farther from the store  Walk to talk to a co-worker instead of calling  Take a 10-minute walk around the block at lunch  Walk to a bus stop a little farther from your home or office  Walk the dog after dinner     ---------------------------------------------------------------------------------------------------------------------------------------------------    Reading Food Labels  Look for the Nutrition Facts label on packaged foods. Reading labels is a big step toward eating healthier. The tips below help you know what to look for.    Serving size. Read this closely because the package, jar, or can may contain more than 1 serving. This is how to measure 1 serving of the food in the package. If you eat more than 1 serving, you get more of everything on the label -- including fat, cholesterol, and calories.  Total fat. This tells you how many grams (g) of fat are in 1 serving. Fat is high in calories. A healthy goal is to have less than 25% of your daily calories come from fat.  Saturated fat. This tells you how much saturated fat is in 1 serving. Saturated fat raises your cholesterol the most. Look for foods that have little or no saturated fat.  Trans fat. This tells you how much trans fat is in 1 serving. Even a small amount of trans fat can harm your health. Choose foods that have no trans fat.  Cholesterol. This tells you how much cholesterol is in 1 serving. For many years, it had been recommended to eat less than 300 milligrams (mg) of cholesterol a day. New guidelines have removed this limitation as cholesterol has been recently shown to not raise blood cholesterol levels as significantly as previously thought. However, many foods high in cholesterol are also high in saturated fat. It is recommended to limit saturated fat in your diet.  Calories from fat. This number tells you how many calories from fat are in 1 serving  (there are 9 calories per gram of fat). Look for foods with few calories from fat.  % Daily value. The higher the number, the more 1 serving has of that nutrient. Look for foods that have low numbers for total fat, saturated fat, cholesterol, and sodium.  Sodium. This tells you how much sodium (salt) is in 1 serving. Choose foods with low numbers for sodium.  Dietary fiber. This number tells you how much fiber is in 1 serving. Foods that are high in fiber can help you feel full. They can also be good for your heart and digestion. The recommended daily amount of fiber is 25 grams for women and 38 grams for men. After age 50, your daily fiber needs drop to 21 grams for women and 30 grams for men.       ---------------------------------------------------------------------------------------------------------------------------------------------------    Understanding Carbohydrates, Fats, and Protein  Food is a source of fuel and nourishment for your body. Its also a source of pleasure. Having diabetes doesnt mean you have to eat special foods or give up desserts. Instead, your dietitian can show you how to plan meals to suit your body. To start, learn how different foods affect blood sugar.    Carbohydrates  Carbohydrates are the main source of fuel for the body. Carbohydrates raise blood sugar. Many people think carbohydrates are only found in pasta or bread. But carbohydrates are actually in many kinds of foods:  Sugars occur naturally in foods such as fruit, milk, honey, and molasses. Sugars can also be added to many foods, from cereals and yogurt to candy and desserts. Sugars raise blood sugar.  Starches are found in bread, cereals, pasta, and dried beans. Theyre also found in corn, peas, potatoes, yam, acorn squash, and butternut squash. Starches also raise blood sugar.   Fiber is found in foods such as vegetables, fruits, beans, and whole grains. Unlike other carbs, fiber isnt digested or absorbed. So it  doesnt raise blood sugar. In fact, fiber can help keep blood sugar from rising too fast. It also helps keep blood cholesterol at a healthy level.  Did you know?  Even though carbohydrates raise blood sugar, its best to have some in every meal. They are an important part of a healthy diet.     Fat  Fat is an energy source that can be stored until needed. Fat does not raise blood sugar. However, it can raise blood cholesterol, increasing the risk of heart disease. Fat is also high in calories, which can cause weight gain. Not all types of fat are the same.    More Healthy:  Monounsaturated fats are mostly found in vegetable oils, such as olive, canola, and peanut oils. They are also found in avocados and some nuts. Monounsaturated fats are healthy for your heart. Thats because they lower LDL (unhealthy) cholesterol.  Polyunsaturated fats are mostly found in vegetable oils, such as corn, safflower, and soybean oils. They are also found in some seeds, nuts, and fish. Polyunsaturated fats lower LDL (unhealthy) cholesterol. So, choosing them instead of saturated fats is healthy for your heart. Certain unsaturated fats can help lower triglycerides.     Less Healthy:  Saturated fats are found in animal products, such as meat, poultry, whole milk, lard, and butter. Saturated fats raise LDL cholesterol and are not healthy for your heart.  Hydrogenated oils and trans fats are formed when vegetable oils are processed into solid fats. They are found in many processed foods. Hydrogenated oils and trans fats raise LDL cholesterol and lower HDL (healthy) cholesterol. They are not healthy for your heart.    Protein  Protein helps the body build and repair muscle and other tissue. Protein has little or no effect on blood sugar. However, many foods that contain protein also contain saturated fat. By choosing low-fat protein sources, you can get the benefits of protein without the extra fat:  Plant protein is found in dry beans and  peas, nuts, and soy products, such as tofu and soymilk. These sources tend to be cholesterol-free and low in saturated fat.  Animal protein is found in fish, poultry, meat, cheese, milk, and eggs. These contain cholesterol and can be high in saturated fat. Aim for lean, lower-fat choices.    ---------------------------------------------------------------------------------------------------------------------------------------------------    Understanding Carbohydrates    A car needs the right type of fuel to run. And you need the right kind of food to function. To keep your energy level up, your body needs food that has carbohydrates. But carbohydrates raise blood sugar levels higher and faster than other kinds of food. Your dietitian will work with you to figure out the amount of carbohydrates you need.    Starches  Starches are found in grains, some vegetables, and beans. Grain products include bread, pasta, cereal, and tortillas. Starchy vegetables include potatoes, peas, corn, lima beans, yams, and squash. Kidney beans, ferguson beans, black beans, garbanzo beans, and lentils also contain starches.    Sugars  Sugars are found naturally in many foods. Or sugar can be added. Foods that contain natural sugar include fruits and fruit juices, dairy products, honey, and molasses. Added sugars are found in most desserts, processed foods, candy, regular soda, and fruit drinks. These are very helpful for treating low blood sugar, or hypoglycemia. They provide sugar quickly. Try to keep at least 15 to 20 grams of these simple sugars with you at all times. Eat this if you begin to have low blood sugar symptoms.    Fiber  Fiber comes from plant foods. Most fiber isnt digested by the body. Instead of raising blood sugar levels like other carbohydrates, it actually stops blood sugar from rising too fast. Fiber is found in fruits, vegetables, whole grains, beans, peas, and many nuts.    Carb counting  Keep track of the amount of  carbohydrates you eat. This can help you keep the right balance of physical activity and medicine. The amount of carbohydrates needed will vary for each person. It depends on many things such as your health, the medicines you take, and how active you are. Your healthcare team will help you figure out the right amount of carbohydrates for you. You may start with around 45 to 60 grams of carbohydrate per meal, depending on your situation. Carb counting is a system that helps you keep track of the carbohydrates you eat at each meal.  Carbohydrates come from a variety of foods. These include grains, starchy vegetables, fruit, milk, beans, and snack foods. You can either count carbohydrate grams or carbohydrate servings. When you count carbohydrate servings, 1 carbohydrate serving = 15 grams of carbohydrates.  Here are some examples of foods containing about 15 grams of carbohydrates (1 serving of carbohydrates):  1/2 cup of canned or frozen fruit  A small piece of fresh fruit (4 ounces)  1 slice of bread  1/2 cup of oatmeal  1/3 cup of rice  4 to 6 crackers  1/2 English muffin  1/2 cup of black beans  1/4 of a large baked potato (3 ounces)  2/3 cup of plain fat-free yogurt  1 cup of soup  1/2 cup of casserole  6 chicken nuggets  2-inch-square brownie or cake without frosting  2 small cookies  1/2 cup of ice cream or sherbet    Carb counting is easier when food labels are available. Look at the label to see how many grams of total carbohydrates the food contains. Then you can figure out how much you should eat.  Two very important lines to look at on the label are the serving size and the total carbohydrate amount. Here are some tips for using food labels to count your carbohydrate intake:  Check the serving size. The information on the label is based on that serving size. If you eat more than the listed serving size, you may have to double or triple the other information on the label.   Check the total grams of  carbohydrates. Total carbohydrate from the label includes sugar, starch, and fiber. Be sure to use the total carbohydrate number and not sugar alone.  Know how many grams of carbohydrates you can have.  Be familiar with the matching portion sizes.  Compare labels. Compare the labels of different products, looking at serving sizes and total carbohydrates to find the products that work best for you.   Don't forget protein and fat. With all the focus on carb counting, it might be easy to forget protein and fat in your meals. Don't forget to include sources of protein and healthy fat to balance your meals.  Its also important to be consistent with the amount and time you eat when taking a fixed dose of diabetes medicine. Work with your healthcare provider or dietitian if you need additional help. He or she can help you keep track of your carbohydrate intake. He or she can also help you figure out how many grams of carbohydrates you should have.      ---------------------------------------------------------------------------------------------------------------------------------------------------    Diabetes: Learning About Serving and Portion Sizes     A good rule of thumb: Devote half your plate to vegetables and green salad. Split the other half between protein and starchy carbohydrates. Fruit makes a good dessert.     Servings and portions. Whats the difference? These terms can be very confusing. But learning to measure serving sizes can help you figure out how many carbohydrates (carbs) and other foods you eat each day. They are also powerful tools for managing your weight.    Servings and portions  Many different words are used to describe amounts of food. If your health care provider uses a term youre not sure of, dont be afraid to ask. It helps to know the difference between servings and portions:  A serving size is a fixed size. Food producers use this term to describe their products. For example, the label  on a cereal box could say that 1 cup of dry cereal = 1 serving.  A portion (also called a helping) is how much you eat or how much you put on your plate at a meal. For example, you might eat 2 cups of cereal at breakfast.    Using serving information  The portion you choose to eat (such as 2 cups of cereal) may be more than one serving as listed on the food label (such as 1 cup of cereal). Thats why it helps to measure or weigh the food you eat. Because the food label values are based on servings, youll need to know how many servings you eat at one sitting.     Ounces: 2 to 3 ounces is about the size of your palm.       1 Cup: 1 cup (or a medium-sized piece) is about the size of your fist.       1/2 Cup: 1/2 cup is about the size of your cupped hand.      One tablespoon is about the size of your thumb.  One teaspoon is about the size of the tip of your thumb.    Keeping track of serving sizes  When youre planning for a snack or a meal, keep servings in mind. If you dont have measuring cups or a scale handy, there are ways to eyeball serving sizes, such as comparing your food to the size of your hand (see pictures above).    Managing portion sizes  If your weight is a concern, reducing your portions can help. You can eat more than one serving of a food at once. But to keep from eating too much at one meal, learn how to manage your portions. A portion is the amount of each type of food on your plate. See the plate diagram for an example of balanced portions.    ---------------------------------------------------------------------------------------------------------------------------------------------------    Diabetes: Shopping for and Preparing Meals    Having diabetes doesnt mean you have to shop in a special aisle or look for special foods. But you will need to make choices. By comparing items and reading food labels, you can find the healthiest foods for you and your family.  Comparing items  When you  "shop, compare items to find the best ones for your needs. Keep these facts in mind:  No sugar added does not mean a product is sugar-free.  "Sugar-free" means less than 1/2 gram (g) of sugar per serving.  Fat free means less than 1/2 g of fat per serving. This does not necessarily mean the product is low in calories.  Low fat means 3 g fat or less per serving. Reduced fat or less fat means 25% less fat than the regular version. Some of this fat may be saturated or trans fat. And calories per serving may be similar to the regular version.    Making small changes  Dont try to change all of your eating habits at once. Here are some ideas to start with:  Try fat-free or low-fat cheese, milk, and yogurt. Also try leaner cuts of meat. This will help you cut down on saturated fat.  Try whole-grain breads, brown rice, and whole-wheat pasta.  Load up on fresh or frozen vegetables. If you buy canned, choose low-sodium vegetables.  Avoid processed foods as much as possible. They tend to be low in fiber and high in trans fats and sodium.  Try tofu, soymilk, or meat substitutes.?They can help you cut cholesterol and saturated fat out of your diet.    Learning to read food labels  To find healthy foods that help you control blood sugar, learn how to read food labels. Look for the Nutrition Facts label on packaged foods. It will tell you how much carbohydrate, sugar, fat, and fiber is in each serving. Then, you can decide whether or not the food fits into your meal plan.    Using the food label  So, once you have the food label, what do you do with it? The food label helps in many ways. Use it to:  Compare items and decide which is the best for your health needs.  Track the number of carbohydrates in your portions.  Figure out how many servings of a food you can have and still stay within the number of carbohydrates for that meal.    Planning meals  For good blood sugar control, plan what and when youll eat. Start by " creating a meal plan that includes all the food groups. Then, time your meals to help keep your blood sugar level steady. You may need to adjust your plan for special situations.    Eat from all the food groups  The basis of a healthy meal plan is variety (eating many different types of foods). Look for lean meats, fresh fruits and vegetables, whole grains, and low-fat or non-fat dairy products. Eating a wide variety of foods provides the nutrients your body needs. It can also keep you from getting bored with your meal plan.    Reduce liquid sugars  Extra calories from sodas, sports drinks, and fruit drinks make it hard to keep blood sugar in range. Cut as many liquid sugars from your meal plan as you can. This includes most fruit juices, which are often high in natural or added sugar. Instead, drink plenty of water and other sugar-free beverages.    Eat less fat  If you need to lose some weight, try to reduce the amount of fat in your diet. This can also help lower your cholesterol level to keep blood vessels healthier. Cut fat by using only small amounts of liquid oil for cooking. Read food labels carefully to avoid foods with unhealthy trans fats.    Timing your meals  When it comes to blood sugar control, when you eat is as important as what you eat. You may need to eat several small meals spaced evenly throughout the day to stay in your target range. So dont skip breakfast or wait until late in the day to get most of your calories. Doing so can cause your blood sugar to rise too high or fall too low.    Cooking wisely  Broil, steam, bake, or grill meats and vegetables, instead of frying.  Instead of cream-based sauces or sugary glazes, flavor foods with vegetable purée, lemon or lime juice, or herb seasonings.  Remove skin from chicken and turkey before serving.  Look in cookbooks for easy, low-fat, low-sugar recipes. When making your usual recipes, cut sugar by 1/2 and fat by  1/3.      ---------------------------------------------------------------------------------------------------------------------------------------------------    Healthy Meals for Diabetes    Ask your healthcare team to help you make a meal plan that fits your needs. Your meal plan tells you when to eat your meals and snacks, what kinds of foods to eat, and how much of each food to eat. You dont have to give up all the foods you like. But you do need to follow some guidelines.  Choose healthy carbohydrates  Starches, sugars, and fiber are all types of carbohydrates. Fiber can help lower your cholesterol and triglycerides. Fiber is also healthy for your heart. You should have 20 to 35 grams of total fiber each day. Fiber-rich foods include:  Whole-grain breads and cereals  Bulgur wheat  Brown rice     Whole-wheat pasta  Fruits and vegetables  Dry beans, and peas   Keep track of the amount of carbohydrates you eat. This can help you keep the right balance of physical activity and medicine. The amount of carbohydrates needed will vary for each person. It depends on many things such as your health, the medicines you take, and how active you are. Your healthcare team will help you figure out the right amount of carbohydrates for you. You may start with around 45 to 60 grams of carbohydrates per meal, depending on your situation.   Here are some examples of foods containing about 15 grams of carbohydrates (1 serving of carbohydrates):  1/2 cup of canned or frozen fruit  A small piece of fresh fruit (4 ounces)  1 slice of bread  1/2 cup of oatmeal  1/3 cup of rice  4 to 6 crackers  1/2 English muffin  1/2 cup of black beans  1/4 of a large baked potato (3 ounces)  2/3 cup of plain fat-free yogurt  1 cup of soup  1/2 cup of casserole  6 chicken nuggets  2-inch-square brownie or cake without frosting  2 small cookies  1/2 cup of ice cream or sherbet    Choose healthy protein foods  Eating protein that is low in fat can help  you control your weight. It also helps keep your heart healthy. Low-fat protein foods include:  Fish  Plant proteins, such as dry beans and peas, nuts, and soy products like tofu and soymilk  Lean meat with all visible fat removed  Poultry with the skin removed  Low-fat or nonfat milk, cheese, and yogurt    Limit unhealthy fats and sugar  Saturated and trans fats are unhealthy for your heart. They raise LDL (bad) cholesterol. Fat is also high in calories, so it can make you gain weight. To cut down on unhealthy fats and sugar, limit these foods:  Butter or margarine  Palm and palm kernel oils and coconut oil  Cream  Cheese  Paula  Lunch meats     Ice cream  Sweet bakery goods such as pies, muffins, and donuts  Jams and jellies  Candy bars  Regular sodas     How much to eat  The amount of food you eat affects your blood sugar. It also affects your weight. Your healthcare team will tell you how much of each type of food you should eat.  Use measuring cups and spoons and a food scale to measure serving sizes.  Learn what a correct serving size looks like on your plate. This will help when you are away from home and cant measure your servings.  Eat only the number of servings given on your meal plan for each food. Dont take seconds.  Learn to read food labels. Be sure to look at serving size, total carbohydrates, fiber, calories, sugar, and saturated and trans fats. Look for healthier alternatives to foods that have added sugar.  Plan ahead for parties so you can still have a good time without going overboard with unhealthy food choices. Set a good example yourself by bringing a healthy dish to pot lucks.     Choose healthy snacks  When it comes to snacks, we usually think about foods with added sugar and fats. But there are many other options for healthier snack choices. Here are a few snack ideas to choose from:  Snacks with less than 5 grams of carbohydrates  1 piece of string cheese  3 celery sticks plus 1  tablespoon of peanut butter  5 cherry tomatoes plus 1 tablespoon of ranch dressing  1 hard-boiled egg  1/4 cup of fresh blueberries   5 baby carrots  1 cup of light popcorn  1/2 cup of sugar-free gelatin  15 almonds  Snacks with about 10 to 20 grams of carbohydrates  1/3 cup of hummus plus 1 cup of fresh cut nonstarchy vegetables (carrots, green peppers, broccoli, celery, or a combination)  1/2 cup of fresh or canned fruit plus 1/4 cup of cottage cheese  1/2 cup of tuna salad with 4 crackers  2 rice cakes and a tablespoon of peanut butter  1 small apple or orange  3 cups light popcorn  1/2 of a turkey sandwich (1 slice of whole-wheat bread, 2 ounces of turkey, and mustard)  Portion sizes are important to controlling your blood sugar and staying at a healthy weight. Stock up on healthy snack items so you always have them on hand.    When to eat  Your meal plan will likely include breakfast, lunch, dinner, and some snacks.  Try to eat your meals and snacks at about the same times each day.  Eat all your meals and snacks. Skipping a meal or snack can make your blood sugar drop too low. It can also cause you to eat too much at the next meal or snack. Then your blood sugar could get too high.    ---------------------------------------------------------------------------------------------------------------------------------------------------    Eating Out When You Have Diabetes  Eating right is an important part of keeping your blood sugar in your target range. You just need to make healthy choices.    Be creative when eating out. Many places dont make you stick strictly to the menu. Instead of ordering a large entree, choose an appetizer or two and a bowl of soup. A mix of side orders can also make a good meal. Read the descriptions of other entrees and specials. If another entree comes with baby carrots, ask for a side order of them even if they dont come with your entree. Ask how food is prepared or if it can be made  differently.  Tips for making the most of your meal out  Ask to have high-fat, high-calorie extras, such as French fries and potato chips, left off your plate so you wont be tempted.   Ask what substitutions are available. Instead of French fries, you may be able to have a side of salad with low-calorie dressing.  Order low-fat milk instead of cream for your coffee.  Ask for vegetables and main courses to be served without sauces, butter, margarine, or oil.  Be aware of portion size. You dont have to clean your plate. Take half your meal home in a doggie bag to eat the next day.  Look for heart-healthy or low-fat entrees that include whole grains, vegetables, or fruits.  Choose foods that are grilled, broiled, or steamed.  Avoid dishes that are described on the menu as fried, breaded, smothered, rich, or creamy.    Tips for restaurant meals  When you eat away from home try these tips:  Try to schedule your dining-out meal at your normal meal time. Make a reservation if possible, so you don't have to wait to eat. If you can't make a reservation, try to arrive at the restaurant at a less-busy time to cut down your wait time. Eat a small fruit or starch snack at your regular mealtime if your restaurant meal is going to be later than usual.   Call ahead to see if the restaurant can meet your dietary needs if you've never been there before. Or you can go online to see the menu ahead of time.  Carry some crackers with you in case the restaurant needs you to wait until you can be served.  Ask how foods are prepared before you order.  Instead of fried, sautéed, or breaded foods, choose ones that are broiled, steamed, grilled, or baked.  Ask for sauces, gravies, and dressings on the side.  Only eat an amount that fits your meal plan. Remember: You can take home the leftovers.  Save dessert for special occasions. Then choose a small dessert or share one with a friend or family member.    Make healthy choices  Fast  food  Garden salad with light dressing on the side  Baked potato with vegetables or herbs  Broiled, roasted, or grilled chicken sandwich  Sliced turkey or lean roast beef sandwich    Mexican  Chicken enchilada, without cheese or sour cream   Small burrito with whole beans and chicken  Whole beans (not refried) and rice  Chicken or fish fajitas    Steakhouse  Grilled or broiled lean cuts of beef  Baked potato with vegetables or herbs  Broiled or baked chicken. Dont eat the skin.  Steamed vegetables    Asian  Steamed dumplings or potstickers  Broiled, boiled, or steamed meats or fish  Sushi or sashimi  Steamed rice or boiled noodles. One serving is equal to 1/3 cup.      © 6096-4584 [employer]. [address] [place] [address], [place] [address]. All rights reserved. This information is not intended as a substitute for professional medical care. Always follow your healthcare professional's instructions.

## 2023-01-28 ENCOUNTER — LAB VISIT (OUTPATIENT)
Dept: LAB | Facility: HOSPITAL | Age: 63
End: 2023-01-28
Payer: COMMERCIAL

## 2023-01-28 DIAGNOSIS — Z85.850 HISTORY OF PAPILLARY ADENOCARCINOMA OF THYROID: ICD-10-CM

## 2023-01-28 DIAGNOSIS — E11.65 UNCONTROLLED TYPE 2 DIABETES MELLITUS WITH HYPERGLYCEMIA, WITHOUT LONG-TERM CURRENT USE OF INSULIN: Chronic | ICD-10-CM

## 2023-01-28 LAB
25(OH)D3+25(OH)D2 SERPL-MCNC: 52 NG/ML (ref 30–96)
ALBUMIN SERPL BCP-MCNC: 4.2 G/DL (ref 3.5–5.2)
ALP SERPL-CCNC: 76 U/L (ref 55–135)
ALT SERPL W/O P-5'-P-CCNC: 23 U/L (ref 10–44)
ANION GAP SERPL CALC-SCNC: 12 MMOL/L (ref 8–16)
AST SERPL-CCNC: 15 U/L (ref 10–40)
BILIRUB SERPL-MCNC: 0.9 MG/DL (ref 0.1–1)
BUN SERPL-MCNC: 9 MG/DL (ref 8–23)
CALCIUM SERPL-MCNC: 10.4 MG/DL (ref 8.7–10.5)
CHLORIDE SERPL-SCNC: 104 MMOL/L (ref 95–110)
CHOLEST SERPL-MCNC: 180 MG/DL (ref 120–199)
CHOLEST/HDLC SERPL: 2.6 {RATIO} (ref 2–5)
CO2 SERPL-SCNC: 25 MMOL/L (ref 23–29)
CREAT SERPL-MCNC: 0.8 MG/DL (ref 0.5–1.4)
EST. GFR  (NO RACE VARIABLE): >60 ML/MIN/1.73 M^2
ESTIMATED AVG GLUCOSE: 252 MG/DL (ref 68–131)
GLUCOSE SERPL-MCNC: 222 MG/DL (ref 70–110)
HBA1C MFR BLD: 10.4 % (ref 4–5.6)
HDLC SERPL-MCNC: 70 MG/DL (ref 40–75)
HDLC SERPL: 38.9 % (ref 20–50)
LDLC SERPL CALC-MCNC: 89.6 MG/DL (ref 63–159)
NONHDLC SERPL-MCNC: 110 MG/DL
POTASSIUM SERPL-SCNC: 4.2 MMOL/L (ref 3.5–5.1)
PROT SERPL-MCNC: 7.9 G/DL (ref 6–8.4)
SODIUM SERPL-SCNC: 141 MMOL/L (ref 136–145)
TRIGL SERPL-MCNC: 102 MG/DL (ref 30–150)
TSH SERPL DL<=0.005 MIU/L-ACNC: 3.11 UIU/ML (ref 0.4–4)

## 2023-01-28 PROCEDURE — 83036 HEMOGLOBIN GLYCOSYLATED A1C: CPT | Performed by: NURSE PRACTITIONER

## 2023-01-28 PROCEDURE — 36415 COLL VENOUS BLD VENIPUNCTURE: CPT | Performed by: NURSE PRACTITIONER

## 2023-01-28 PROCEDURE — 80053 COMPREHEN METABOLIC PANEL: CPT | Performed by: NURSE PRACTITIONER

## 2023-01-28 PROCEDURE — 82306 VITAMIN D 25 HYDROXY: CPT | Performed by: NURSE PRACTITIONER

## 2023-01-28 PROCEDURE — 86800 THYROGLOBULIN ANTIBODY: CPT | Performed by: NURSE PRACTITIONER

## 2023-01-28 PROCEDURE — 80061 LIPID PANEL: CPT | Performed by: NURSE PRACTITIONER

## 2023-01-28 PROCEDURE — 84443 ASSAY THYROID STIM HORMONE: CPT | Performed by: NURSE PRACTITIONER

## 2023-01-29 DIAGNOSIS — R80.9 PROTEINURIA, UNSPECIFIED TYPE: ICD-10-CM

## 2023-01-29 DIAGNOSIS — E89.0 POSTOPERATIVE HYPOTHYROIDISM: ICD-10-CM

## 2023-01-29 DIAGNOSIS — E78.2 MIXED HYPERLIPIDEMIA: ICD-10-CM

## 2023-01-29 DIAGNOSIS — Z85.850 HISTORY OF PAPILLARY ADENOCARCINOMA OF THYROID: Primary | ICD-10-CM

## 2023-01-29 DIAGNOSIS — E11.65 UNCONTROLLED TYPE 2 DIABETES MELLITUS WITH HYPERGLYCEMIA, WITHOUT LONG-TERM CURRENT USE OF INSULIN: ICD-10-CM

## 2023-01-29 RX ORDER — EZETIMIBE 10 MG/1
10 TABLET ORAL DAILY
Qty: 90 TABLET | Refills: 3 | Status: SHIPPED | OUTPATIENT
Start: 2023-01-29 | End: 2023-07-17 | Stop reason: SDUPTHER

## 2023-01-29 RX ORDER — LEVOTHYROXINE SODIUM 125 UG/1
125 TABLET ORAL
Qty: 30 TABLET | Refills: 11 | Status: SHIPPED | OUTPATIENT
Start: 2023-01-29 | End: 2023-06-14

## 2023-01-30 ENCOUNTER — TELEPHONE (OUTPATIENT)
Dept: PHARMACY | Facility: CLINIC | Age: 63
End: 2023-01-30
Payer: COMMERCIAL

## 2023-01-30 NOTE — PROGRESS NOTES
Latest Reference Range & Units 01/28/23 09:16   Sodium 136 - 145 mmol/L 141   Potassium 3.5 - 5.1 mmol/L 4.2   Chloride 95 - 110 mmol/L 104   CO2 23 - 29 mmol/L 25   Anion Gap 8 - 16 mmol/L 12   BUN 8 - 23 mg/dL 9   Creatinine 0.5 - 1.4 mg/dL 0.8   eGFR >60 mL/min/1.73 m^2 >60.0   Glucose 70 - 110 mg/dL 222 (H)   Calcium 8.7 - 10.5 mg/dL 10.4   Alkaline Phosphatase 55 - 135 U/L 76   PROTEIN TOTAL 6.0 - 8.4 g/dL 7.9   Albumin 3.5 - 5.2 g/dL 4.2   BILIRUBIN TOTAL 0.1 - 1.0 mg/dL 0.9   AST 10 - 40 U/L 15   ALT 10 - 44 U/L 23   Cholesterol 120 - 199 mg/dL 180   HDL 40 - 75 mg/dL 70   HDL/Cholesterol Ratio 20.0 - 50.0 % 38.9   LDL Cholesterol External 63.0 - 159.0 mg/dL 89.6   Non-HDL Cholesterol mg/dL 110   Total Cholesterol/HDL Ratio 2.0 - 5.0  2.6   Triglycerides 30 - 150 mg/dL 102   Vit D, 25-Hydroxy 30 - 96 ng/mL 52   Hemoglobin A1C External 4.0 - 5.6 % 10.4 (H)   Estimated Avg Glucose 68 - 131 mg/dL 252 (H)   TSH 0.400 - 4.000 uIU/mL 3.106   (H): Data is abnormally high      Message sent with below  - Your A1c was 10.4%  - Your glucose (blood sugar) was elevated at 222  - Your kidney and liver function were normal   - Your electrolytes were normal   - Your vitamin D was normal   - Your thyroid level was above goal. I want to increase your thyroid medication from Levothyroxine (synthroid) 112 mcg daily to 125 mcg daily. I will call it in now. Someone from my office will be in touch to schedule a repeat thyroid level in 8 weeks.  - Your cholesterol is above goal. You are taking atorvastatin 80 mg daily, that is the max dose. We will add zetia 10 mg daily to the atorvastatin 80 mg daily. Please take both medications for your cholesterol.   -  Your urine showed you are spilling protein into your urine from the diabetes. We will consult nephrology.      Your A1c is very high. Typically, when I see a number above 10%, I will add insulin which I know you cannot take at this time. Please send me a picture of your log with  your blood sugars in a week.

## 2023-01-31 LAB
THRYOGLOBULIN INTERPRETATION: ABNORMAL
THYROGLOB AB SERPL-ACNC: <1.8 IU/ML
THYROGLOB SERPL-MCNC: 14 NG/ML

## 2023-02-03 ENCOUNTER — CLINICAL SUPPORT (OUTPATIENT)
Dept: DIABETES | Facility: CLINIC | Age: 63
End: 2023-02-03
Payer: COMMERCIAL

## 2023-02-03 DIAGNOSIS — E11.65 UNCONTROLLED TYPE 2 DIABETES MELLITUS WITH HYPERGLYCEMIA, WITHOUT LONG-TERM CURRENT USE OF INSULIN: Chronic | ICD-10-CM

## 2023-02-03 PROCEDURE — 99999 PR PBB SHADOW E&M-EST. PATIENT-LVL I: ICD-10-PCS | Mod: PBBFAC,,,

## 2023-02-03 PROCEDURE — G0108 PR DIAB MANAGE TRN  PER INDIV: ICD-10-PCS | Mod: S$GLB,,,

## 2023-02-03 PROCEDURE — G0108 DIAB MANAGE TRN  PER INDIV: HCPCS | Mod: S$GLB,,,

## 2023-02-03 PROCEDURE — 99999 PR PBB SHADOW E&M-EST. PATIENT-LVL I: CPT | Mod: PBBFAC,,,

## 2023-02-06 ENCOUNTER — TELEPHONE (OUTPATIENT)
Dept: ENDOCRINOLOGY | Facility: CLINIC | Age: 63
End: 2023-02-06
Payer: COMMERCIAL

## 2023-02-07 NOTE — PROGRESS NOTES
Diabetes Care Specialist Progress Note  Author: Ashley Summers RN, CDE  Date: 2/3/2023    Program Intake  Reason for Diabetes Program Visit:: Intervention  Type of Intervention:: Individual  Individual: Device Training  Device Training: Other (Glucometer teaching)    Lab Results   Component Value Date    HGBA1C 10.4 (H) 01/28/2023       Clinical  Clinical Assessment  Current Diabetes Treatment: Oral Medication, Injectable (Metformin 1000 mg bid; Mounjaro 2.5 mg weekly; glimiperide 2 mg daily)  Have you ever experienced hypoglycemia (low blood sugar)?: no  Have you ever experienced hyperglycemia (high blood sugar)?: no    Medication Information  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  How many days a week do you miss your medications?: Never  Do you sometimes have difficulty refilling your medications?: No  Medication adherence impacting ability to self-manage diabetes?: No      Nutritional Status  Meal Plan 24 Hour Recall: Breakfast, Lunch, Dinner, Snack  Meal Plan 24 Hour Recall - Breakfast: bologna sandwich  Meal Plan 24 Hour Recall - Lunch: lasagna  Meal Plan 24 Hour Recall - Dinner: jambalaya, white beans  Meal Plan 24 Hour Recall - Snack: pepperoni    drinks water powerade- unsure if sf    Additional Social History    Support  Does anyone support you with your diabetes care?: yes  Who supports you?: family member  Who takes you to your medical appointments?: self  Does the current support meet the patient's needs?: Yes  Is Support an area impacting ability to self-manage diabetes?: No    Access to Mass Media & Technology  Does the patient have access to any of the following devices or technologies?: Smart phone  Media or technology needs impacting ability to self-manage diabetes?: No    Cognitive/Behavioral Health  Alert and Oriented: Yes  Difficulty Thinking: No  Requires Prompting: No  Requires assistance for routine expression?: No  Cognitive or behavioral barriers impacting ability to  self-manage diabetes?: No         Communication  Language preference: English  Hearing Problems: No  Vision Problems: No  Communication needs impacting ability to self-manage diabetes?: No    Health Literacy  Preferred Learning Method: Face to Face, Demonstration, Reading Materials  How often do you need to have someone help you read instructions, pamphlets, or written material from your doctor or pharmacy?: Never  Health literacy needs impacting ability to self-manage diabetes?: No      Diabetes Self-Management Skills Assessment    Home Blood Glucose Monitoring  Patient states that blood sugar is checked at home daily.: no  Reasons for not monitoring:: needs training (has meter but does not test- does not want to stick finger- waiting on ron)  Home Blood Glucose Monitoring Skills Assessment Completed: : Yes  Assessment indicates:: Knowledge deficit, Instruction Needed  Area of need?: Yes    Acute Complications  Patient is able to identify types of acute complications: No  Assessment indicates:: Knowledge deficit, Instruction Needed  Area of need?: Yes    Chronic Complications  Patient can identify major chronic complications of diabetes.: no  Patient can identify ways to prevent or delay diabetes complications.: no  Patient is aware that having diabetes increases risk of heart disease?: No  Patient is aware that heart disease is the leading cause of death and disability in people with diabetes?: No  Patient able to state risk factors for heart disease?: No  Patient is taking statin?: Yes  Assessment indicates:: Knowledge deficit, Instruction Needed  Area of need?: Yes    Psychosocial/Coping  Patient can identify ways of coping with chronic disease.: yes  Patient-stated ways of coping with chronic disease:: support from loved ones  Assessment indicates:: Adequate understanding  Area of need?: No     Assessment Summary and Plan    Based on today's diabetes care assessment, the following areas of need were  identified:      Social 2/3/2023   Support No   Access to Mass Media/Tech No   Cognitive/Behavioral Health No   Communication No   Health Literacy No        Clinical 2/3/2023   Medication Adherence No        Diabetes Self-Management Skills 2/3/2023   Home Blood Glucose Monitoring Yes, see care planning   Acute Complications Yes - Reviewed blood glucose goals, prevention, detection, signs and symptoms, and treatment of hypoglycemia following rule of 15 and hyperglycemia, and when to contact the clinic. Advised to carry a source of fast acting carbs.    Chronic Complications Yes, Discussed long term complications of diabetes. Patient agrees to have the following diabetes rupa maintenance screenings/appointments yearly eye exams, foot exams.  She wants to avoid taking insulin because of her occupation.    Psychosocial/Coping No          Today's interventions were provided through individual discussion, instruction, and written materials were provided.      Patient verbalized understanding of instruction and written materials.  Pt was able to return back demonstration of instructions today. Patient understood key points, needs reinforcement and further instruction.     Diabetes Self-Management Care Plan:    Today's Diabetes Self-Management Care Plan was developed with Rand's input. Rand has agreed to work toward the following goal(s) to improve his/her overall diabetes control.      Care Plan: Diabetes Management   Updates made since 1/8/2023 12:00 AM        Problem: Healthy Eating         Long-Range Goal: Eat 3 meals daily with 2-3 servings of Carbohydrate per meal. Completed 2/3/2023   Start Date: 3/4/2022   Priority: High   Barriers: Knowledge deficit; Lack of Motivation to Change   Note:    -Emphasized importance of eliminating all sugar sweetened beverages, including fruit juice. Discussed sugar free drink options.   -Discussed carb vs non-carb foods and instructed on appropriate amounts of carbs to have  at meals and snacks.   -Recommended 30-45 gm carb at meals  -Recommended 0-15 gm carb at snacks  -Instructed/reviewed how to read nutrition label.   -Reviewed need to limit total/saturated fats despite lesser effect on BG increase.   -Encouraged carb sources primarily from whole grains, fresh/frozen fruits, and low-fat milk and yogurt.   -Discussed meal plans and snack ideas amenable to pt.          Problem: Blood Glucose Self-Monitoring         Goal: Patient agrees to check and record blood sugars 1-2 times per day.    Start Date: 2/3/2023   Priority: High   Barriers: Knowledge deficit   Note:    Discussed goal BGs for different times of day and in relation to meals. Instructed pt to test BG twice daily at varying times: fasting and 2-hours after any meal. Reviewed need for updated BG logs for all endo, PCP, and education appts.    Update to care planning 2/3/2023:  Patients insurance will not cover the Freestyle ron.  Called pharmacy and stated pt did not quality because she is not taking insulin.  She does not have a glucometer.  Provided patient one in clinic and instructed how to test. Advised will have Endo call in RX for her own supplies.  Instructed to test once per day at different times of the day.        Problem: Healthy Eating         Goal: Eat 3 meals daily with 30-45g/2-3 servings of Carbohydrate per meal.    Start Date: 2/3/2023   Priority: Medium   Barriers: Knowledge deficit   Note:    Instructed pt on the food groups, how to read labels and count carbs. Pt was given sample menus and meal plans as examples. Discussed with pt the importance of eating 3 balanced and portioned meals per day. Pt has not started taking her Ozempic as to date. She is only taking Metformin once a day. Discussed with pt the importance of taking medication as prescribed. Discussed with pt what she needs to adjust in her meals to make them more balanced. Discussed with pt better snack choices. Discussed with pt how to  choose the proper drinks to consume. 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 Pt will work on meal planning. Pt will bring supplies for Binh to start tomorrow.    Update to care planning 2/6/23:  Reviewed meal planning and general nutritional counseling with regards to diabetes management. We concentrated on portion sizes at today's session.  Encouraged increased consumption of non-starchy veggies to diet.  Overall meal planning and making better choices for meals.  Patient is motivated to make changes. Reinforced topics reviewed in previous visit.        Task: Provided Sample plate method and reviewed the use of the plate to estimate amounts of carbohydrate per meal. Completed 2/3/2023          Follow Up Plan     F/u on 2/23/23 to review goals and logs    Today's care plan and follow up schedule was discussed with patient.  Rand verbalized understanding of the care plan, goals, and agrees to follow up plan.        The patient was encouraged to communicate with his/her health care provider/physician and care team regarding his/her condition(s) and treatment.  I provided the patient with my contact information today and encouraged to contact me via phone or [facility] Patient Portal as needed.     Length of Visit   Total Time: 60 Minutes

## 2023-02-10 DIAGNOSIS — E11.65 UNCONTROLLED TYPE 2 DIABETES MELLITUS WITH HYPERGLYCEMIA, WITHOUT LONG-TERM CURRENT USE OF INSULIN: ICD-10-CM

## 2023-02-23 ENCOUNTER — CLINICAL SUPPORT (OUTPATIENT)
Dept: DIABETES | Facility: CLINIC | Age: 63
End: 2023-02-23
Payer: COMMERCIAL

## 2023-02-23 VITALS — BODY MASS INDEX: 30.75 KG/M2 | WEIGHT: 214.31 LBS

## 2023-02-23 DIAGNOSIS — E11.65 UNCONTROLLED TYPE 2 DIABETES MELLITUS WITH HYPERGLYCEMIA, WITHOUT LONG-TERM CURRENT USE OF INSULIN: Primary | Chronic | ICD-10-CM

## 2023-02-23 DIAGNOSIS — E11.65 UNCONTROLLED TYPE 2 DIABETES MELLITUS WITH HYPERGLYCEMIA, WITHOUT LONG-TERM CURRENT USE OF INSULIN: Primary | ICD-10-CM

## 2023-02-23 PROCEDURE — G0108 DIAB MANAGE TRN  PER INDIV: HCPCS | Mod: S$GLB,,, | Performed by: INTERNAL MEDICINE

## 2023-02-23 PROCEDURE — 99999 PR PBB SHADOW E&M-EST. PATIENT-LVL I: ICD-10-PCS | Mod: PBBFAC,,,

## 2023-02-23 PROCEDURE — 99999 PR PBB SHADOW E&M-EST. PATIENT-LVL I: CPT | Mod: PBBFAC,,,

## 2023-02-23 PROCEDURE — G0108 PR DIAB MANAGE TRN  PER INDIV: ICD-10-PCS | Mod: S$GLB,,, | Performed by: INTERNAL MEDICINE

## 2023-02-23 RX ORDER — TIRZEPATIDE 5 MG/.5ML
5 INJECTION, SOLUTION SUBCUTANEOUS
Qty: 4 PEN | Refills: 0 | Status: SHIPPED | OUTPATIENT
Start: 2023-02-23 | End: 2023-03-09

## 2023-02-23 NOTE — PROGRESS NOTES
Diabetes Care Specialist Progress Note  Author: Ashley Summers RN, CDE  Date: 2/23/2023    Program Intake  Reason for Diabetes Program Visit:: Intervention  Type of Intervention:: Individual  Individual: Education  Education: Self-Management Skill Review, Pattern Management  Current diabetes risk level:: high  In the last 12 months, have you:: none  Permission to speak with others about care:: no    Lab Results   Component Value Date    HGBA1C 10.4 (H) 01/28/2023       Clinical  Problem Review  Reviewed Problem List with Patient: yes  Active comorbidities affecting diabetes self-care.: yes  Comorbidities: Hypertension  Reviewed health maintenance: yes    Clinical Assessment  Current Diabetes Treatment: Oral Medication, Injectable (Metformin 1000 mg bid; Mounjaro 2.5 mg weekly; glimiperide 2 mg daily)  Have you ever experienced hypoglycemia (low blood sugar)?: no  Have you ever experienced hyperglycemia (high blood sugar)?: no    Medication Information  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  How many days a week do you miss your medications?: Never  Do you sometimes have difficulty refilling your medications?: No  Medication adherence impacting ability to self-manage diabetes?: No      Nutritional Status  Meal Plan 24 Hour Recall: Breakfast, Lunch, Dinner, Snack (see care planning for update)  Meal Plan 24 Hour Recall - Breakfast: bologna sandwich  Meal Plan 24 Hour Recall - Lunch: lasagna  Meal Plan 24 Hour Recall - Dinner: jambalaya, white beans  Meal Plan 24 Hour Recall - Snack: pepperoni    drinks water powerade- unsure if sf    Additional Social History    Support  Does anyone support you with your diabetes care?: yes  Who supports you?: family member  Who takes you to your medical appointments?: self  Does the current support meet the patient's needs?: No  Is Support an area impacting ability to self-manage diabetes?: No    Access to [employer]  Does the patient have access to any of  the following devices or technologies?: Smart phone  Media or technology needs impacting ability to self-manage diabetes?: No    Cognitive/Behavioral Health  Alert and Oriented: Yes  Difficulty Thinking: No  Requires Prompting: No  Requires assistance for routine expression?: No  Cognitive or behavioral barriers impacting ability to self-manage diabetes?: No    Communication  Language preference: English  Hearing Problems: No  Vision Problems: No  Communication needs impacting ability to self-manage diabetes?: No    Health Literacy  Preferred Learning Method: Face to Face, Demonstration, Reading Materials  How often do you need to have someone help you read instructions, pamphlets, or written material from your doctor or pharmacy?: Never  Health literacy needs impacting ability to self-manage diabetes?: No      Diabetes Self-Management Skills Assessment    Diabetes Disease Process/Treatment Options  Patient/caregiver able to state what happens when someone has diabetes.: somewhat  Patient/caregiver knows what type of diabetes they have.: yes  Diabetes Type : Type II  Diabetes Disease Process/Treatment Options: Skills Assessment Completed: Yes  Assessment indicates:: Adequate understanding  Area of need?: No    Nutrition/Healthy Eating  Nutrition/Healthy Eating Skills Assessment Completed:: Yes  Assessment indicates:: Instruction Needed    Physical Activity/Exercise  Patient's daily activity level:: lightly active  Patient formally exercises outside of work.: no  Reasons for not exercising:: work schedule  Physical Activity/Exercise Skills Assessment Completed: : Yes  Assessment indicates:: Adequate understanding  Area of need?: No    Medications  Patient is able to describe current diabetes management routine.: yes  Diabetes management routine:: injectable medications, oral medications (Mounjaro 2.5 mg weekly; Amaryl 2 mg at breakfast; Metformin 1000 mg bid)  Patient is able to identify current diabetes medications,  dosages, and appropriate timing of medications.: yes  Patient understands the purpose of the medications taken for diabetes.: yes  Patient reports problems or concerns with current medication regimen.: no  Medication Skills Assessment Completed:: Yes  Assessment indicates:: Adequate understanding  Area of need?: No    Home Blood Glucose Monitoring  Patient states that blood sugar is checked at home daily.: no  Reasons for not monitoring:: needs training (has meter but does not test- does not want to stick finger- waiting on ron)  Assessment indicates:: Knowledge deficit, Instruction Needed, Adequate understanding  Area of need?: Yes    Acute Complications  Assessment indicates:: Knowledge deficit, Instruction Needed  Area of need?: Yes    Chronic Complications  Patient can identify major chronic complications of diabetes.: no  Patient can identify ways to prevent or delay diabetes complications.: no  Patient is aware that having diabetes increases risk of heart disease?: No  Patient is aware that heart disease is the leading cause of death and disability in people with diabetes?: No  Patient able to state risk factors for heart disease?: No  Patient is taking statin?: Yes  Assessment indicates:: Knowledge deficit, Instruction Needed  Area of need?: Yes    Psychosocial/Coping  Patient can identify ways of coping with chronic disease.: yes  Patient-stated ways of coping with chronic disease:: support from loved ones  Assessment indicates:: Adequate understanding  Area of need?: No     Assessment Summary and Plan    Based on today's diabetes care assessment, the following areas of need were identified:      Social 2/23/2023   Support No   Access to [employer] No   Cognitive/Behavioral Health No   Communication No   Health Literacy No        Clinical 2/23/2023   Medication Adherence No        Diabetes Self-Management Skills 2/23/2023   Diabetes Disease Process/Treatment Options No   Nutrition/Healthy Eating Yes,  see care planning   Physical Activity/Exercise No   Medication No, Tolerating Mounjaro 2.5 mg doses, verbalized no complaints of side effects. Would like to start 5 mg dosing.    Home Blood Glucose Monitoring Yes, see care planning   Acute Complications Yes, addressed   Chronic Complications Yes, addressed   Psychosocial/Coping No            Today's interventions were provided through individual discussion, instruction, and written materials were provided.      Patient verbalized understanding of instruction and written materials.  Pt was able to return back demonstration of instructions today. Patient understood key points, needs reinforcement and further instruction.     Diabetes Self-Management Care Plan:    Today's Diabetes Self-Management Care Plan was developed with Rand's input. Radn has agreed to work toward the following goal(s) to improve his/her overall diabetes control.      Care Plan: Diabetes Management   Updates made since 1/24/2023 12:00 AM        Problem: Healthy Eating         Long-Range Goal: Eat 3 meals daily with 2-3 servings of Carbohydrate per meal. Completed 2/3/2023   Start Date: 3/4/2022   Priority: High   Barriers: Knowledge deficit; Lack of Motivation to Change   Note:    -Emphasized importance of eliminating all sugar sweetened beverages, including fruit juice. Discussed sugar free drink options.   -Discussed carb vs non-carb foods and instructed on appropriate amounts of carbs to have at meals and snacks.   -Recommended 30-45 gm carb at meals  -Recommended 0-15 gm carb at snacks  -Instructed/reviewed how to read nutrition label.   -Reviewed need to limit total/saturated fats despite lesser effect on BG increase.   -Encouraged carb sources primarily from whole grains, fresh/frozen fruits, and low-fat milk and yogurt.   -Discussed meal plans and snack ideas amenable to pt.      Problem: Blood Glucose Self-Monitoring         Goal: Patient agrees to check and record blood sugars 1-2  times per day.    Start Date: 2/3/2023   Expected End Date: 8/25/2023   This Visit's Progress: On track   Priority: High   Barriers: Knowledge deficit   Note:    Discussed goal BGs for different times of day and in relation to meals. Instructed pt to test BG twice daily at varying times: fasting and 2-hours after any meal. Reviewed need for updated BG logs for all endo, PCP, and education appts.    Update to care planning 2/3/2023:  Patients insurance will not cover the Freestyle ron.  Called pharmacy and stated pt did not quality because she is not taking insulin.  She does not have a glucometer.  Provided patient one in clinic and instructed how to test. Advised will have Endo call in RX for her own supplies.  Instructed to test once per day at different times of the day.     Update to care planning 2/23/2023:  seen for f/u she is doing very well with glucose monitoring.  Checking once per day with glucoses improving since starting Mounjaro. (See logs)       Problem: Healthy Eating         Goal: Eat 3 meals daily with 30-45g/2-3 servings of Carbohydrate per meal.    Start Date: 2/3/2023   Expected End Date: 8/25/2023   This Visit's Progress: On track   Priority: Medium   Barriers: Knowledge deficit   Note:    Instructed pt on the food groups, how to read labels and count carbs. Pt was given sample menus and meal plans as examples. Discussed with pt the importance of eating 3 balanced and portioned meals per day. Pt has not started taking her Ozempic as to date. She is only taking Metformin once a day. Discussed with pt the importance of taking medication as prescribed. Discussed with pt what she needs to adjust in her meals to make them more balanced. Discussed with pt better snack choices. Discussed with pt how to choose the proper drinks to consume. 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 Pt will work on meal planning. Pt will bring supplies for  Binh to start tomorrow.    Update to care planning 2/6/23:  Reviewed meal planning and general nutritional counseling with regards to diabetes management. We concentrated on portion sizes at today's session.  Encouraged increased consumption of non-starchy veggies to diet.  Overall meal planning and making better choices for meals.  Patient is motivated to make changes. Reinforced topics reviewed in previous visit.     Update to care Planning 2/23/23:  Pt has made significant changes to her diet.  She is bringing food with her while she works.  Eats home every night.  Stressed the importance of portion sizing and making better choices.  She is very motivated to keep the changes up.        Task: Provided Sample plate method and reviewed the use of the plate to estimate amounts of carbohydrate per meal. Completed 2/7/2023          Follow Up Plan     Follow up made for 8/25/2023.  Note sent to [place] office to order Mounjaro 5 mg per patient request.     Today's care plan and follow up schedule was discussed with patient.  Rand verbalized understanding of the care plan, goals, and agrees to follow up plan.        The patient was encouraged to communicate with his/her health care provider/physician and care team regarding his/her condition(s) and treatment.  I provided the patient with my contact information today and encouraged to contact me via phone or [facility] Patient Portal as needed.     Length of Visit   Total Time: 60 Minutes

## 2023-03-06 NOTE — PROGRESS NOTES
Subjective:      Chief Complaint: post surgical hypothyroidism and diabetes mellitus type 2    HPI: Rand Kaye is a 62 y.o. female who is here for a follow up evaluation of papillary thyroid cancer and diabetes mellitus type 2. Frequently lost to follow up.  Previous patient of Dr. Whitten. Last visit in Jan 2023    She is accompanied by her daughter, Didier.     Regarding her history of thyroid cancer:  -She had thyroidectomy in 2012 for multi-nodular goiter with compressive symptoms. Incidentally found to have a 1 cm papillary thyroid cancer in left lobe after surgery.   She received 30 mCi iodine after surgery, but did not show for WBS.     -On Synthyroid 125 mcg daily  Denies missed doses  She is taking levothyroxine on an empty stomach and wait 30-45 minutes before eating, drinking, or taking other medications.      Latest Reference Range & Units 02/18/22 14:41 01/28/23 09:16   Thyroglobulin Antibody Screen <1.8 IU/mL <1.8 <1.8   Thyroglobulin, Tumor Marker ng/mL 2.5 (H) 14 (H)   (H): Data is abnormally high    Lab Results   Component Value Date    TSH 3.106 01/28/2023    FREET4 1.07 07/05/2022     Ultrasound Neck   4/2022  -Status post thyroidectomy.  -No sonographic evidence of malignancy.  -Recommend to continue correlation with thyroglobulin levels and repeat neck US in 1 year to follow the 0.5 cm hypoechoic focus in the right inferior thyroid bed and the 1.5 cm hypoechoic focus in the left middle thyroid bed.    8/2018   There is a 5 cm hypoechoic subcutaneous structure at the left lateral neck.  There is additional cystic structure at the thyroid surgical bed.  These findings are not specific yet not well characterized with ultrasound.  While this may be merely a lipoma, in light of the patient's history of papillary thyroid carcinoma, contrast enhanced CT or MRI of the neck would be recommended as the appearance is nonspecific.     CT neck   9/2018  1.2 cm enhancing structure in the left thyroid  resection bed.  Findings are nonspecific and may represent residual thyroid tissue or vascular structure, however in this patient with a history of papillary thyroid carcinoma continued surveillance is recommended.     Pathology      Now regarding Post-Surgical Hypothyroidism  -Patient presents for evaluation of thyroid function. She had thyroidectomy in 2012 for multi-nodular goiter with compressive symptoms. Incidentally found to have a 1 cm papillary thyroid cancer in left lobe after surgery. She received 30 mCi iodine after surgery, but did not show for WBS.   -Current medication is levothyroxine 125 mcg daily (increased after labs in     -Current symptoms:     Jan 2023)    No   Yes    [x]    []   Weight gain    [x]    []   Fatigue    [x]    []   Constipation    [x]    []   Hair loss    [x]    []   Brittle nails    [x]    []   Mental fog    []    [x]   Cold intolerance    [x]    []   Memory impair    [x]    []   Muscle weakness    [x]    []   Neck swelling    [x]    []   Hoarseness    [x]    []   Periorbital edema    [x]    []   Lithium or Amiodarone use    [x]    []   Recent severe illness      Sometimes having diarrhea that she attributes to metformin      Lab Results   Component Value Date    TSH 3.106 01/28/2023    TSH 1.593 07/05/2022    TSH 0.150 (L) 02/18/2022    FREET4 1.07 07/05/2022    FREET4 1.20 02/18/2022    FREET4 1.08 08/03/2018    THYROPEROXID < 6.0 01/23/2012     Now Regarding diabetes:  -Patient was initially diagnosed with Type 2 diabetes mellitus in 2009  -When questioned about pertinent symptoms, denies any polyuria, polydipsia, nocturia, nausea vomiting, abdominal discomfort, numbness/tingling, visual change, weight change   Denies DKA  -Diabetic complications present  She has eye exam scheduled  Neuropathy     :tolerable  Nephropathy   :yes; has seen in the past but not in awhile  CAD: denies    Diabetes History in Family  Father had Type 2 Diabetes Mellitus    -Denies history of  pancreatitis, medullary thyroid cancer, recurrent UTI or recurrent fungal infection     Current diabetic medications include:   Metformin  mg twice daily (tries to increase and has severe diarrhea)  Mounjaro 5 mg weekly (no intolerable side effects)  Glimepiride 2 mg daily    Denies missing doses of above medications    Other medications tried  Ozempic prescribed but did not start  Does not remember being on insulin  States she is  and cannot start insulin    Blood Sugar Range   Does not check her blood sugar with finger sticks at home  Had ron but not covered              Hypoglycemic Episodes:  Denies it    Diet  Meals are: She has changed her diet since her last visit. Has felt satiety effects from Mounjaro  Breakfast: cornflakes or oatmeal; tuna with crackers   Lunch: beans and rice or spaghetti   Dinner: skipped or same as lunch  Eating 3 meals per day  Snacks: protein packs   Drinking diet coke, powerade, water and lemon tea     Does patient count carbohydrates? no    Exercise: She bought a treadmill in the evenings 3 days a week for 30 minutes  Walks, [place]    Screening for Complications:    Dyslipidemia   Statin: Taking Atorvastatin 20 mg daily, LDL above goal at 145    Nephropathy  ACE/ARB: Taking Benazepril 20 mg daily    Lab Results   Component Value Date    HGBA1C 10.4 (H) 01/28/2023    HGBA1C 9.5 (H) 07/05/2022    HGBA1C 9.1 (H) 02/18/2022     Diabetes Management Status  Screening or Prevention Patient's value Goal Complete/Controlled?   HgA1C Testing and Control   Lab Results   Component Value Date    HGBA1C 10.4 (H) 01/28/2023      Annually/Less than 8% No   Lipid profile : 01/28/2023 Annually Yes   LDL control Lab Results   Component Value Date    LDLCALC 89.6 01/28/2023    Annually/Less than 100 mg/dl  No   Nephropathy screening Lab Results   Component Value Date    LABMICR 680.0 01/28/2023     Lab Results   Component Value Date    PROTEINUA Negative 04/19/2012    Annually  Yes   Blood pressure BP Readings from Last 1 Encounters:   03/09/23 124/74    Less than 140/90 No   Dilated retinal exam : 09/24/2018 Annually No   Foot exam   : 01/26/2023 Annually No     Review of Systems   Constitutional:  Negative for fatigue and unexpected weight change.   Eyes:  Negative for visual disturbance.   Endocrine: Negative for polydipsia, polyphagia and polyuria.  as above    Objective:     Vitals:    03/09/23 1412   BP: 124/74   Pulse: 62     BP Readings from Last 5 Encounters:   03/09/23 124/74   01/26/23 130/78   07/07/22 (!) 238/98   06/01/22 (!) 189/88   05/12/22 (!) 164/74     Wt Readings from Last 3 Encounters:   03/09/23 1412 95.8 kg (211 lb 5 oz)   02/23/23 1353 97.2 kg (214 lb 4.6 oz)   01/26/23 1307 99.8 kg (220 lb 0.3 oz)     Physical Exam  Constitutional:       Appearance: She is obese.   Neck:      Comments: No thyroid tissue  Pulmonary:      Effort: Pulmonary effort is normal.   Neurological:      Mental Status: She is alert.   Diabetes Foot Exam:   No sores or lesions to bilateral feet  Shoes appropriate  DM foot exam done in Jan 2023    Wt Readings from Last 10 Encounters:   03/09/23 1412 95.8 kg (211 lb 5 oz)   02/23/23 1353 97.2 kg (214 lb 4.6 oz)   01/26/23 1307 99.8 kg (220 lb 0.3 oz)   07/07/22 1003 102.2 kg (225 lb 5 oz)   06/01/22 1430 103.5 kg (228 lb 2.8 oz)   05/13/22 0730 101.2 kg (223 lb)   05/12/22 1406 101.2 kg (223 lb 1.7 oz)   05/12/22 1300 101.2 kg (223 lb)   05/12/22 1047 101.2 kg (223 lb 3.5 oz)   05/12/22 0924 101.4 kg (223 lb 8.7 oz)       Lab Results   Component Value Date    HGBA1C 10.4 (H) 01/28/2023     Lab Results   Component Value Date    CHOL 180 01/28/2023    HDL 70 01/28/2023    LDLCALC 89.6 01/28/2023    TRIG 102 01/28/2023    CHOLHDL 38.9 01/28/2023     Lab Results   Component Value Date     01/28/2023    K 4.2 01/28/2023     01/28/2023    CO2 25 01/28/2023     (H) 01/28/2023    BUN 9 01/28/2023    CREATININE 0.8 01/28/2023     CALCIUM 10.4 01/28/2023    PROT 7.9 01/28/2023    ALBUMIN 4.2 01/28/2023    BILITOT 0.9 01/28/2023    ALKPHOS 76 01/28/2023    AST 15 01/28/2023    ALT 23 01/28/2023    ANIONGAP 12 01/28/2023    ESTGFRAFRICA >60.0 02/18/2022    EGFRNONAA >60.0 02/18/2022    TSH 3.106 01/28/2023      Lab Results   Component Value Date    MICALBCREAT 309.1 (H) 01/28/2023     Assessment/Plan:     1. Uncontrolled type 2 diabetes mellitus with hyperglycemia, without long-term current use of insulin  tirzepatide 7.5 mg/0.5 mL PnIj    glimepiride (AMARYL) 1 MG tablet    Comprehensive Metabolic Panel    Hemoglobin A1C      2. Postoperative hypothyroidism  TSH    Thyroglobulin      3. History of papillary adenocarcinoma of thyroid        4. Essential hypertension        5. Mixed hyperlipidemia          Uncontrolled type 2 diabetes mellitus with hyperglycemia, without long-term current use of insulin  -- Reviewed goals of therapy are to get the best control we can without hypoglycemia  Medication changes:       A1c above goal but her blood sugar is improving nicely via log  Will     Continue Metformin  mg twice daily  Increase Mounjaro 7.5 mg weekly for 4 weeks.  Decrease glimepiride to 1 mg daily. Start cutting your glimepiride 2 mg tablet in half and I will call glimepiride 1 mg tab    Send me a log in 1-2 weeks. Message me sooner for any blood sugar less than 70.  Goal A1c today is <7.5% but eventual <6.5% without hypoglycemia   -- Advised frequent self blood glucose monitoring.  Patient encouraged to document glucose results and bring them to every clinic visit    -- Hypoglycemia precautions discussed. Instructed on precautions before driving.    -- Call for Bg repeatedly < 90 or > 180.   -- Close adherence to lifestyle changes recommended.   -- Periodic follow ups for eye evaluations, foot care and dental care suggested.    Postoperative hypothyroidism  - Continue synthroid 125 mcg daily  - Will repeat TSH and target a goal of  0.5-2    History of papillary adenocarcinoma of thyroid  -Stage 1 papillary thyroid cancer s/p thyroidectomy and radioiodine in 2012   -History of elevated thyroglobulin and ultrasound with possible residual thyroid tissue but no lymphadenopathy   -Repeated thyroglobulin levels were down trending, but now at 14 with an elevated TSH  - Recheck TG today  - Has US scheduled in April 2023        Essential hypertension  On ACEI    Mixed hyperlipidemia  On statin   Goal LDL <70        Follow up in about 6 weeks (around 4/20/2023).       normal...

## 2023-03-09 ENCOUNTER — LAB VISIT (OUTPATIENT)
Dept: LAB | Facility: HOSPITAL | Age: 63
End: 2023-03-09
Payer: COMMERCIAL

## 2023-03-09 ENCOUNTER — OFFICE VISIT (OUTPATIENT)
Dept: ENDOCRINOLOGY | Facility: CLINIC | Age: 63
End: 2023-03-09
Payer: COMMERCIAL

## 2023-03-09 VITALS
HEIGHT: 70 IN | OXYGEN SATURATION: 99 % | WEIGHT: 211.31 LBS | DIASTOLIC BLOOD PRESSURE: 74 MMHG | BODY MASS INDEX: 30.25 KG/M2 | SYSTOLIC BLOOD PRESSURE: 124 MMHG | HEART RATE: 62 BPM

## 2023-03-09 DIAGNOSIS — Z85.850 HISTORY OF PAPILLARY ADENOCARCINOMA OF THYROID: ICD-10-CM

## 2023-03-09 DIAGNOSIS — I10 ESSENTIAL HYPERTENSION: ICD-10-CM

## 2023-03-09 DIAGNOSIS — E11.65 UNCONTROLLED TYPE 2 DIABETES MELLITUS WITH HYPERGLYCEMIA, WITHOUT LONG-TERM CURRENT USE OF INSULIN: Chronic | ICD-10-CM

## 2023-03-09 DIAGNOSIS — E89.0 POSTOPERATIVE HYPOTHYROIDISM: ICD-10-CM

## 2023-03-09 DIAGNOSIS — E78.2 MIXED HYPERLIPIDEMIA: ICD-10-CM

## 2023-03-09 LAB
T4 FREE SERPL-MCNC: 1.17 NG/DL (ref 0.71–1.51)
TSH SERPL DL<=0.005 MIU/L-ACNC: 0.22 UIU/ML (ref 0.4–4)
TSH SERPL DL<=0.005 MIU/L-ACNC: 0.22 UIU/ML (ref 0.4–4)

## 2023-03-09 PROCEDURE — 3066F NEPHROPATHY DOC TX: CPT | Mod: CPTII,S$GLB,, | Performed by: NURSE PRACTITIONER

## 2023-03-09 PROCEDURE — 84443 ASSAY THYROID STIM HORMONE: CPT | Performed by: NURSE PRACTITIONER

## 2023-03-09 PROCEDURE — 3046F PR MOST RECENT HEMOGLOBIN A1C LEVEL > 9.0%: ICD-10-PCS | Mod: CPTII,S$GLB,, | Performed by: NURSE PRACTITIONER

## 2023-03-09 PROCEDURE — 1159F PR MEDICATION LIST DOCUMENTED IN MEDICAL RECORD: ICD-10-PCS | Mod: CPTII,S$GLB,, | Performed by: NURSE PRACTITIONER

## 2023-03-09 PROCEDURE — 99999 PR PBB SHADOW E&M-EST. PATIENT-LVL V: CPT | Mod: PBBFAC,,, | Performed by: NURSE PRACTITIONER

## 2023-03-09 PROCEDURE — 3008F BODY MASS INDEX DOCD: CPT | Mod: CPTII,S$GLB,, | Performed by: NURSE PRACTITIONER

## 2023-03-09 PROCEDURE — 1160F PR REVIEW ALL MEDS BY PRESCRIBER/CLIN PHARMACIST DOCUMENTED: ICD-10-PCS | Mod: CPTII,S$GLB,, | Performed by: NURSE PRACTITIONER

## 2023-03-09 PROCEDURE — 99214 PR OFFICE/OUTPT VISIT, EST, LEVL IV, 30-39 MIN: ICD-10-PCS | Mod: S$GLB,,, | Performed by: NURSE PRACTITIONER

## 2023-03-09 PROCEDURE — 1160F RVW MEDS BY RX/DR IN RCRD: CPT | Mod: CPTII,S$GLB,, | Performed by: NURSE PRACTITIONER

## 2023-03-09 PROCEDURE — 3074F PR MOST RECENT SYSTOLIC BLOOD PRESSURE < 130 MM HG: ICD-10-PCS | Mod: CPTII,S$GLB,, | Performed by: NURSE PRACTITIONER

## 2023-03-09 PROCEDURE — 3062F PR POS MACROALBUMINURIA RESULT DOCUMENTED/REVIEW: ICD-10-PCS | Mod: CPTII,S$GLB,, | Performed by: NURSE PRACTITIONER

## 2023-03-09 PROCEDURE — 99999 PR PBB SHADOW E&M-EST. PATIENT-LVL V: ICD-10-PCS | Mod: PBBFAC,,, | Performed by: NURSE PRACTITIONER

## 2023-03-09 PROCEDURE — 3062F POS MACROALBUMINURIA REV: CPT | Mod: CPTII,S$GLB,, | Performed by: NURSE PRACTITIONER

## 2023-03-09 PROCEDURE — 3078F PR MOST RECENT DIASTOLIC BLOOD PRESSURE < 80 MM HG: ICD-10-PCS | Mod: CPTII,S$GLB,, | Performed by: NURSE PRACTITIONER

## 2023-03-09 PROCEDURE — 3046F HEMOGLOBIN A1C LEVEL >9.0%: CPT | Mod: CPTII,S$GLB,, | Performed by: NURSE PRACTITIONER

## 2023-03-09 PROCEDURE — 3078F DIAST BP <80 MM HG: CPT | Mod: CPTII,S$GLB,, | Performed by: NURSE PRACTITIONER

## 2023-03-09 PROCEDURE — 3008F PR BODY MASS INDEX (BMI) DOCUMENTED: ICD-10-PCS | Mod: CPTII,S$GLB,, | Performed by: NURSE PRACTITIONER

## 2023-03-09 PROCEDURE — 3074F SYST BP LT 130 MM HG: CPT | Mod: CPTII,S$GLB,, | Performed by: NURSE PRACTITIONER

## 2023-03-09 PROCEDURE — 4010F PR ACE/ARB THEARPY RXD/TAKEN: ICD-10-PCS | Mod: CPTII,S$GLB,, | Performed by: NURSE PRACTITIONER

## 2023-03-09 PROCEDURE — 1159F MED LIST DOCD IN RCRD: CPT | Mod: CPTII,S$GLB,, | Performed by: NURSE PRACTITIONER

## 2023-03-09 PROCEDURE — 84439 ASSAY OF FREE THYROXINE: CPT | Performed by: NURSE PRACTITIONER

## 2023-03-09 PROCEDURE — 86800 THYROGLOBULIN ANTIBODY: CPT | Performed by: NURSE PRACTITIONER

## 2023-03-09 PROCEDURE — 99214 OFFICE O/P EST MOD 30 MIN: CPT | Mod: S$GLB,,, | Performed by: NURSE PRACTITIONER

## 2023-03-09 PROCEDURE — 3066F PR DOCUMENTATION OF TREATMENT FOR NEPHROPATHY: ICD-10-PCS | Mod: CPTII,S$GLB,, | Performed by: NURSE PRACTITIONER

## 2023-03-09 PROCEDURE — 4010F ACE/ARB THERAPY RXD/TAKEN: CPT | Mod: CPTII,S$GLB,, | Performed by: NURSE PRACTITIONER

## 2023-03-09 PROCEDURE — 36415 COLL VENOUS BLD VENIPUNCTURE: CPT | Performed by: NURSE PRACTITIONER

## 2023-03-09 RX ORDER — GLIMEPIRIDE 1 MG/1
1 TABLET ORAL
Qty: 90 TABLET | Refills: 3 | Status: SHIPPED | OUTPATIENT
Start: 2023-03-09 | End: 2023-04-26

## 2023-03-09 NOTE — ASSESSMENT & PLAN NOTE
-- Reviewed goals of therapy are to get the best control we can without hypoglycemia  Medication changes:       A1c above goal but her blood sugar is improving nicely via log  Will     Continue Metformin  mg twice daily  Increase Mounjaro 7.5 mg weekly for 4 weeks.  Decrease glimepiride to 1 mg daily. Start cutting your glimepiride 2 mg tablet in half and I will call glimepiride 1 mg tab    Send me a log in 1-2 weeks. Message me sooner for any blood sugar less than 70.  Goal A1c today is <7.5% but eventual <6.5% without hypoglycemia   -- Advised frequent self blood glucose monitoring.  Patient encouraged to document glucose results and bring them to every clinic visit    -- Hypoglycemia precautions discussed. Instructed on precautions before driving.    -- Call for Bg repeatedly < 90 or > 180.   -- Close adherence to lifestyle changes recommended.   -- Periodic follow ups for eye evaluations, foot care and dental care suggested.

## 2023-03-09 NOTE — ASSESSMENT & PLAN NOTE
-Stage 1 papillary thyroid cancer s/p thyroidectomy and radioiodine in 2012   -History of elevated thyroglobulin and ultrasound with possible residual thyroid tissue but no lymphadenopathy   -Repeated thyroglobulin levels were down trending, but now at 14 with an elevated TSH  - Recheck TG today  - Has US scheduled in April 2023

## 2023-03-09 NOTE — PATIENT INSTRUCTIONS
Thyroid Cancer              Continue Levothyroxine 125 mcg daily              Check labs     Diabetes  A1c above goal but her blood sugar is improving nicely via log  Will     Continue Metformin  mg twice daily  Increase Mounjaro 7.5 mg weekly for 4 weeks.  Decrease glimepiride to 1 mg daily. Start cutting your glimepiride 2 mg tablet in half and I will call glimepiride 1 mg tab    Send me a log in 1-2 weeks. Message me sooner for any blood sugar less than 70.  Goal A1c today is <7.5% but eventual <6.5% without hypoglycemia     Goal blood sugar is  fasting   Goal blood sugar 1 hour after meal is less than 180  Goal blood sugar 2 hour after meal is less than 140

## 2023-03-10 DIAGNOSIS — Z85.850 HISTORY OF PAPILLARY ADENOCARCINOMA OF THYROID: ICD-10-CM

## 2023-03-10 DIAGNOSIS — E11.65 UNCONTROLLED TYPE 2 DIABETES MELLITUS WITH HYPERGLYCEMIA, WITHOUT LONG-TERM CURRENT USE OF INSULIN: Primary | ICD-10-CM

## 2023-03-10 NOTE — PROGRESS NOTES
Latest Reference Range & Units 03/09/23 15:04   TSH 0.400 - 4.000 uIU/mL  0.400 - 4.000 uIU/mL 0.222 (L)  0.222 (L)   Free T4 0.71 - 1.51 ng/dL 1.17   Thyroglobulin Interpretation  SEE BELOW   Thyroglobulin Antibody Screen <1.8 IU/mL <1.8   Thyroglobulin, Tumor Marker ng/mL 2.5 (H)   (L): Data is abnormally low  (H): Data is abnormally high    - Your thyroglobulin level is coming down --this is good news   - Your thyroid level shows you are now on too much thyroid medication.    You are going to decrease the Synthroid to 125 mcg 1 tab Monday-Saturday and 1/2 tab on Sunday. We will recheck labs in 6 weeks.

## 2023-03-16 ENCOUNTER — TELEPHONE (OUTPATIENT)
Dept: ENDOCRINOLOGY | Facility: CLINIC | Age: 63
End: 2023-03-16
Payer: COMMERCIAL

## 2023-03-16 NOTE — TELEPHONE ENCOUNTER
Called patient no answer was calling to let her know about her lab results. Patient's mailbox is full couldn't leave a message or call back number.

## 2023-03-21 ENCOUNTER — TELEPHONE (OUTPATIENT)
Dept: ENDOCRINOLOGY | Facility: CLINIC | Age: 63
End: 2023-03-21
Payer: COMMERCIAL

## 2023-04-21 ENCOUNTER — LAB VISIT (OUTPATIENT)
Dept: LAB | Facility: HOSPITAL | Age: 63
End: 2023-04-21
Attending: NURSE PRACTITIONER
Payer: COMMERCIAL

## 2023-04-21 ENCOUNTER — TELEPHONE (OUTPATIENT)
Dept: ENDOCRINOLOGY | Facility: CLINIC | Age: 63
End: 2023-04-21
Payer: COMMERCIAL

## 2023-04-21 DIAGNOSIS — Z85.850 HISTORY OF PAPILLARY ADENOCARCINOMA OF THYROID: ICD-10-CM

## 2023-04-21 LAB — TSH SERPL DL<=0.005 MIU/L-ACNC: 0.05 UIU/ML (ref 0.4–4)

## 2023-04-21 PROCEDURE — 36415 COLL VENOUS BLD VENIPUNCTURE: CPT | Mod: PO | Performed by: NURSE PRACTITIONER

## 2023-04-21 PROCEDURE — 84443 ASSAY THYROID STIM HORMONE: CPT | Mod: PO | Performed by: NURSE PRACTITIONER

## 2023-04-21 PROCEDURE — 84439 ASSAY OF FREE THYROXINE: CPT | Performed by: NURSE PRACTITIONER

## 2023-04-21 NOTE — TELEPHONE ENCOUNTER
----- Message from Caitlyn Jessica sent at 4/21/2023  1:35 PM CDT -----  Regarding: Pt called to speak to the nurse regarding her 5 pm appt today and pt would like an immediate call back due to also having labs on next Wednesday  Patient Advice:    Pt called to speak to the nurse regarding her 5 pm appt today and pt would like an immediate call back due to also having labs on next Wednesday    Pt can be reached at [phone]

## 2023-04-21 NOTE — TELEPHONE ENCOUNTER
Called pt to see if there was something I could assist with. Patient stated she got all bloodwork done today and needed no further assistance.

## 2023-04-22 LAB — T4 FREE SERPL-MCNC: 1.13 NG/DL (ref 0.71–1.51)

## 2023-04-23 NOTE — PROGRESS NOTES
Subjective:      Chief Complaint: post surgical hypothyroidism and diabetes mellitus type 2    HPI: Rand Kaye is a 62 y.o. female who is here for a follow up evaluation of papillary thyroid cancer and diabetes mellitus type 2. Frequently lost to follow up.  Previous patient of Dr. Whitten. Last visit in March 2023    She is accompanied by her daughter, Didier.     Regarding her history of thyroid cancer:  -She had thyroidectomy in 2012 for multi-nodular goiter with compressive symptoms. Incidentally found to have a 1 cm papillary thyroid cancer in left lobe after surgery.   She received 30 mCi iodine after surgery, but did not show for WBS.      Latest Reference Range & Units 01/28/23 09:16 03/09/23 15:04   Thyroglobulin Interpretation  SEE BELOW SEE BELOW   Thyroglobulin Antibody Screen <1.8 IU/mL <1.8 <1.8   Thyroglobulin, Tumor Marker ng/mL 14 (H) 2.5 (H)   (H): Data is abnormally high    Lab Results   Component Value Date    TSH 0.053 (L) 04/21/2023    FREET4 1.13 04/21/2023     Ultrasound Neck 4/2023  FINDINGS:  THYROID GLAND has been surgically removed.   Sonographic examination of neck compartments II through V was carried out.  No pathologic lymphadenopathy identified.   There is a 0.4 x 0.6 x 0.4 cm hypoechoic focus in the right inferior thyroid bed.  There is no vascularity present, borders are well defined.  No microcalcification's are seen. This focus measured 0.5 x 0.5 x 0.4 cm on previous ultrasound.   There is a 1.5 x 1.0 x 0.93 cm hypoechoic focus in the left middle thyroid bed, likely residual thyroid tissue.  Vascularity is peripheral, borders are well defined.  No microcalcification's are seen.  This focus measured 1.5 x 0.8 x 0.4 cm on previous ultrasound.   Impression:   Status post thyroidectomy.   No sonographic evidence of malignancy.   Recommend to continue correlation with thyroglobulin levels and repeat neck ultrasound in 1 year to follow the 0.4 cm hypoechoic focus in the right  inferior thyroid bed and the 1.5 cm hypoechoic focus in the left middle thyroid bed.    CT neck   9/2018  1.2 cm enhancing structure in the left thyroid resection bed.  Findings are nonspecific and may represent residual thyroid tissue or vascular structure, however in this patient with a history of papillary thyroid carcinoma continued surveillance is recommended.     Pathology      Now regarding Post-Surgical Hypothyroidism  -Patient presents for evaluation of thyroid function. She had thyroidectomy in 2012 for multi-nodular goiter with compressive symptoms. Incidentally found to have a 1 cm papillary thyroid cancer in left lobe after surgery. She received 30 mCi iodine after surgery, but did not show for WBS.     -Current medication is levothyroxine 125 mcg daily but reduced via portal message to levothyroxine 125 mcg 1 tab Monday-Saturday and 1/2 tab on Sunday and did not read message     -Current symptoms:     Jan 2023)    No   Yes    [x]    []   Weight gain    [x]    []   Fatigue    [x]    []   Constipation    [x]    []   Hair loss    [x]    []   Brittle nails    [x]    []   Mental fog    []    [x]   Cold intolerance    [x]    []   Memory impair    [x]    []   Muscle weakness    [x]    []   Neck swelling    [x]    []   Hoarseness    [x]    []   Periorbital edema    [x]    []   Lithium or Amiodarone use    [x]    []   Recent severe illness      Sometimes having diarrhea that she attributes to metformin      Lab Results   Component Value Date    TSH 0.053 (L) 04/21/2023    TSH 0.222 (L) 03/09/2023    TSH 0.222 (L) 03/09/2023    FREET4 1.13 04/21/2023    FREET4 1.17 03/09/2023    FREET4 1.07 07/05/2022    THYROPEROXID < 6.0 01/23/2012     Now Regarding diabetes:  -Patient was initially diagnosed with Type 2 diabetes mellitus in 2009  -When questioned about pertinent symptoms, denies any polyuria, polydipsia, nocturia, nausea vomiting, abdominal discomfort, numbness/tingling, visual change, weight change   Denies  DKA  -Diabetic complications present  She has eye exam scheduled  Neuropathy :tolerable  Nephropathy :+  CAD: denies    Diabetes History in Family  Father had Type 2 Diabetes Mellitus    -Denies history of pancreatitis, medullary thyroid cancer, recurrent UTI or recurrent fungal infection     Current diabetic medications include:   Metformin  mg twice daily (tries to increase and has severe diarrhea)  Mounjaro 7.5 mg weekly (no intolerable side effects)  Glimepiride 1 mg daily    Denies missing doses of above medications    Other medications tried  Ozempic prescribed but did not start  Does not remember being on insulin  States she is  and cannot start insulin    Blood Sugar Range   Does not check her blood sugar with finger sticks at home  Had ron but not covered  See media tab    Hypoglycemic Episodes:  Denies it and denies s/s of hypoglycemia    Diet  Meals are: She has changed her diet since her last visit. Has felt satiety effects from Mounjaro  Breakfast: cornflakes or oatmeal; tuna with crackers   Lunch: beans and rice or spaghetti   Dinner: skipped or same as lunch  Eating 3 meals per day  Snacks: protein packs   Drinking diet coke, powerade, water and lemon tea     Does patient count carbohydrates? no    Exercise: She bought a treadmill in the evenings 3 days a week for 30 minutes but not using yet  Walks, [place]    Screening for Complications:    Dyslipidemia   Statin: Taking Atorvastatin 20 mg daily, LDL above goal at 145  Nephropathy  ACE/ARB: Taking Benazepril 20 mg daily    Lab Results   Component Value Date    HGBA1C 6.3 (H) 04/21/2023    HGBA1C 10.4 (H) 01/28/2023    HGBA1C 9.5 (H) 07/05/2022     Diabetes Management Status  Screening or Prevention Patient's value Goal Complete/Controlled?   HgA1C Testing and Control   Lab Results   Component Value Date    HGBA1C 6.3 (H) 04/21/2023      Annually/Less than 8% No   Lipid profile : 01/28/2023 Annually Yes   LDL control Lab  Results   Component Value Date    LDLCALC 89.6 01/28/2023    Annually/Less than 100 mg/dl  No   Nephropathy screening Lab Results   Component Value Date    LABMICR 680.0 01/28/2023     Lab Results   Component Value Date    PROTEINUA Negative 04/19/2012    Annually Yes   Blood pressure BP Readings from Last 1 Encounters:   04/26/23 (!) 142/78    Less than 140/90 No   Dilated retinal exam : 09/24/2018 Annually No   Foot exam   : 01/26/2023 Annually No     Review of Systems   Constitutional:  Negative for fatigue and unexpected weight change.   Eyes:  Negative for visual disturbance.   Endocrine: Negative for polydipsia, polyphagia and polyuria.    as above    Objective:     Vitals:    04/26/23 1327   BP: (!) 142/78   Pulse: (!) 55     BP Readings from Last 5 Encounters:   04/26/23 (!) 142/78   03/09/23 124/74   01/26/23 130/78   07/07/22 (!) 238/98   06/01/22 (!) 189/88     Wt Readings from Last 3 Encounters:   04/26/23 1327 93.8 kg (206 lb 10.9 oz)   03/09/23 1412 95.8 kg (211 lb 5 oz)   02/23/23 1353 97.2 kg (214 lb 4.6 oz)     Physical Exam  Constitutional:       Appearance: She is obese.   Neck:      Comments: No thyroid tissue  Pulmonary:      Effort: Pulmonary effort is normal.   Neurological:      Mental Status: She is alert.   Diabetes Foot Exam:   No sores or lesions to bilateral feet  Shoes appropriate  DM foot exam done in Jan 2023    Wt Readings from Last 10 Encounters:   04/26/23 1327 93.8 kg (206 lb 10.9 oz)   03/09/23 1412 95.8 kg (211 lb 5 oz)   02/23/23 1353 97.2 kg (214 lb 4.6 oz)   01/26/23 1307 99.8 kg (220 lb 0.3 oz)   07/07/22 1003 102.2 kg (225 lb 5 oz)   06/01/22 1430 103.5 kg (228 lb 2.8 oz)   05/13/22 0730 101.2 kg (223 lb)   05/12/22 1406 101.2 kg (223 lb 1.7 oz)   05/12/22 1300 101.2 kg (223 lb)   05/12/22 1047 101.2 kg (223 lb 3.5 oz)     Lab Results   Component Value Date    HGBA1C 6.3 (H) 04/21/2023     Lab Results   Component Value Date    CHOL 180 01/28/2023    HDL 70 01/28/2023     LDLCALC 89.6 01/28/2023    TRIG 102 01/28/2023    CHOLHDL 38.9 01/28/2023     Lab Results   Component Value Date     04/21/2023    K 3.4 (L) 04/21/2023     04/21/2023    CO2 31 (H) 04/21/2023     04/21/2023    BUN 14 04/21/2023    CREATININE 0.87 04/21/2023    CALCIUM 10.1 04/21/2023    PROT 7.6 04/21/2023    ALBUMIN 4.5 04/21/2023    BILITOT 0.4 04/21/2023    ALKPHOS 54 04/21/2023    AST 29 04/21/2023    ALT 36 04/21/2023    ANIONGAP 7 (L) 04/21/2023    ESTGFRAFRICA >60.0 02/18/2022    EGFRNONAA >60.0 02/18/2022    TSH 0.053 (L) 04/21/2023      Lab Results   Component Value Date    MICALBCREAT 309.1 (H) 01/28/2023     Assessment/Plan:     1. Uncontrolled type 2 diabetes mellitus with hyperglycemia, without long-term current use of insulin  metFORMIN (GLUCOPHAGE-XR) 500 MG ER 24hr tablet    tirzepatide 10 mg/0.5 mL PnIj    TSH      2. Postoperative hypothyroidism        3. History of papillary adenocarcinoma of thyroid        4. Essential hypertension        5. Mixed hyperlipidemia        6. Controlled type 2 diabetes mellitus without complication, without long-term current use of insulin          Controlled type 2 diabetes mellitus, without long-term current use of insulin  -- Reviewed goals of therapy are to get the best control we can without hypoglycemia  Medication changes:     A1c at goal   Continue Metformin  mg twice daily  Increase Mounjaro 10 mg weekly   Stop glimepiride      -- Advised frequent self blood glucose monitoring.  Patient encouraged to document glucose results and bring them to every clinic visit    -- Hypoglycemia precautions discussed. Instructed on precautions before driving.    -- Call for Bg repeatedly < 90 or > 180.   -- Close adherence to lifestyle changes recommended.   -- Periodic follow ups for eye evaluations, foot care and dental care suggested.    Postoperative hypothyroidism  Change levothyroxine 125 mcg 1 tab Monday-Saturday and 1/2 tab on Sunday.    Check labs in 6 weeks  - Will repeat TSH and target a goal of 0.5-2    History of papillary adenocarcinoma of thyroid  -Stage 1 papillary thyroid cancer s/p thyroidectomy and radioiodine in 2012   -History of elevated thyroglobulin and ultrasound with possible residual thyroid tissue but no lymphadenopathy   TG trending downward and stable. Repeat TG in one year  US in 2023 stable 0.4 cm in right bed and 1.5 cm in left bed  Repeat US in one year      Essential hypertension  On ACEI    Mixed hyperlipidemia  On statin   Goal LDL <70      Follow up in about 6 months (around 10/26/2023).

## 2023-04-26 ENCOUNTER — HOSPITAL ENCOUNTER (OUTPATIENT)
Dept: ENDOCRINOLOGY | Facility: CLINIC | Age: 63
Discharge: HOME | End: 2023-04-26
Attending: NURSE PRACTITIONER
Payer: COMMERCIAL

## 2023-04-26 ENCOUNTER — OFFICE VISIT (OUTPATIENT)
Dept: ENDOCRINOLOGY | Facility: CLINIC | Age: 63
End: 2023-04-26
Payer: COMMERCIAL

## 2023-04-26 VITALS
BODY MASS INDEX: 29.59 KG/M2 | HEART RATE: 55 BPM | HEIGHT: 70 IN | SYSTOLIC BLOOD PRESSURE: 142 MMHG | WEIGHT: 206.69 LBS | OXYGEN SATURATION: 98 % | DIASTOLIC BLOOD PRESSURE: 78 MMHG

## 2023-04-26 DIAGNOSIS — E11.65 UNCONTROLLED TYPE 2 DIABETES MELLITUS WITH HYPERGLYCEMIA, WITHOUT LONG-TERM CURRENT USE OF INSULIN: Chronic | ICD-10-CM

## 2023-04-26 DIAGNOSIS — Z85.850 HISTORY OF PAPILLARY ADENOCARCINOMA OF THYROID: ICD-10-CM

## 2023-04-26 DIAGNOSIS — E11.9 CONTROLLED TYPE 2 DIABETES MELLITUS WITHOUT COMPLICATION, WITHOUT LONG-TERM CURRENT USE OF INSULIN: ICD-10-CM

## 2023-04-26 DIAGNOSIS — E78.2 MIXED HYPERLIPIDEMIA: ICD-10-CM

## 2023-04-26 DIAGNOSIS — E89.0 POSTOPERATIVE HYPOTHYROIDISM: ICD-10-CM

## 2023-04-26 DIAGNOSIS — I10 ESSENTIAL HYPERTENSION: ICD-10-CM

## 2023-04-26 PROCEDURE — 76536 US SOFT TISSUE HEAD NECK THYROID: ICD-10-PCS | Mod: S$GLB,,, | Performed by: INTERNAL MEDICINE

## 2023-04-26 PROCEDURE — 3078F DIAST BP <80 MM HG: CPT | Mod: CPTII,S$GLB,, | Performed by: NURSE PRACTITIONER

## 2023-04-26 PROCEDURE — 1160F RVW MEDS BY RX/DR IN RCRD: CPT | Mod: CPTII,S$GLB,, | Performed by: NURSE PRACTITIONER

## 2023-04-26 PROCEDURE — 3008F PR BODY MASS INDEX (BMI) DOCUMENTED: ICD-10-PCS | Mod: CPTII,S$GLB,, | Performed by: NURSE PRACTITIONER

## 2023-04-26 PROCEDURE — 3077F PR MOST RECENT SYSTOLIC BLOOD PRESSURE >= 140 MM HG: ICD-10-PCS | Mod: CPTII,S$GLB,, | Performed by: NURSE PRACTITIONER

## 2023-04-26 PROCEDURE — 3044F HG A1C LEVEL LT 7.0%: CPT | Mod: CPTII,S$GLB,, | Performed by: NURSE PRACTITIONER

## 2023-04-26 PROCEDURE — 76536 US EXAM OF HEAD AND NECK: CPT | Mod: S$GLB,,, | Performed by: INTERNAL MEDICINE

## 2023-04-26 PROCEDURE — 99999 PR PBB SHADOW E&M-EST. PATIENT-LVL V: ICD-10-PCS | Mod: PBBFAC,,, | Performed by: NURSE PRACTITIONER

## 2023-04-26 PROCEDURE — 99214 PR OFFICE/OUTPT VISIT, EST, LEVL IV, 30-39 MIN: ICD-10-PCS | Mod: S$GLB,,, | Performed by: NURSE PRACTITIONER

## 2023-04-26 PROCEDURE — 3077F SYST BP >= 140 MM HG: CPT | Mod: CPTII,S$GLB,, | Performed by: NURSE PRACTITIONER

## 2023-04-26 PROCEDURE — 4010F ACE/ARB THERAPY RXD/TAKEN: CPT | Mod: CPTII,S$GLB,, | Performed by: NURSE PRACTITIONER

## 2023-04-26 PROCEDURE — 1160F PR REVIEW ALL MEDS BY PRESCRIBER/CLIN PHARMACIST DOCUMENTED: ICD-10-PCS | Mod: CPTII,S$GLB,, | Performed by: NURSE PRACTITIONER

## 2023-04-26 PROCEDURE — 3062F PR POS MACROALBUMINURIA RESULT DOCUMENTED/REVIEW: ICD-10-PCS | Mod: CPTII,S$GLB,, | Performed by: NURSE PRACTITIONER

## 2023-04-26 PROCEDURE — 3062F POS MACROALBUMINURIA REV: CPT | Mod: CPTII,S$GLB,, | Performed by: NURSE PRACTITIONER

## 2023-04-26 PROCEDURE — 1159F PR MEDICATION LIST DOCUMENTED IN MEDICAL RECORD: ICD-10-PCS | Mod: CPTII,S$GLB,, | Performed by: NURSE PRACTITIONER

## 2023-04-26 PROCEDURE — 99214 OFFICE O/P EST MOD 30 MIN: CPT | Mod: S$GLB,,, | Performed by: NURSE PRACTITIONER

## 2023-04-26 PROCEDURE — 3078F PR MOST RECENT DIASTOLIC BLOOD PRESSURE < 80 MM HG: ICD-10-PCS | Mod: CPTII,S$GLB,, | Performed by: NURSE PRACTITIONER

## 2023-04-26 PROCEDURE — 1159F MED LIST DOCD IN RCRD: CPT | Mod: CPTII,S$GLB,, | Performed by: NURSE PRACTITIONER

## 2023-04-26 PROCEDURE — 99999 PR PBB SHADOW E&M-EST. PATIENT-LVL V: CPT | Mod: PBBFAC,,, | Performed by: NURSE PRACTITIONER

## 2023-04-26 PROCEDURE — 3066F NEPHROPATHY DOC TX: CPT | Mod: CPTII,S$GLB,, | Performed by: NURSE PRACTITIONER

## 2023-04-26 PROCEDURE — 3008F BODY MASS INDEX DOCD: CPT | Mod: CPTII,S$GLB,, | Performed by: NURSE PRACTITIONER

## 2023-04-26 PROCEDURE — 3066F PR DOCUMENTATION OF TREATMENT FOR NEPHROPATHY: ICD-10-PCS | Mod: CPTII,S$GLB,, | Performed by: NURSE PRACTITIONER

## 2023-04-26 PROCEDURE — 4010F PR ACE/ARB THEARPY RXD/TAKEN: ICD-10-PCS | Mod: CPTII,S$GLB,, | Performed by: NURSE PRACTITIONER

## 2023-04-26 PROCEDURE — 3044F PR MOST RECENT HEMOGLOBIN A1C LEVEL <7.0%: ICD-10-PCS | Mod: CPTII,S$GLB,, | Performed by: NURSE PRACTITIONER

## 2023-04-26 RX ORDER — METFORMIN HYDROCHLORIDE 500 MG/1
TABLET ORAL
COMMUNITY
Start: 2022-11-02 | End: 2023-12-19

## 2023-04-26 RX ORDER — LANCETS 28 GAUGE
EACH MISCELLANEOUS 4 TIMES DAILY
COMMUNITY
Start: 2023-02-13

## 2023-04-26 RX ORDER — METFORMIN HYDROCHLORIDE 500 MG/1
1000 TABLET, EXTENDED RELEASE ORAL 2 TIMES DAILY
Qty: 180 TABLET | Refills: 2 | Status: SHIPPED | OUTPATIENT
Start: 2023-04-26 | End: 2023-07-19

## 2023-04-26 RX ORDER — PREDNISONE 10 MG/1
10 TABLET ORAL 3 TIMES DAILY
COMMUNITY
Start: 2023-03-27 | End: 2023-04-26

## 2023-04-26 RX ORDER — CALCIPOTRIENE 50 UG/G
CREAM TOPICAL
COMMUNITY
Start: 2023-03-09 | End: 2023-12-19

## 2023-04-26 RX ORDER — BETAMETHASONE DIPROPIONATE 0.5 MG/G
CREAM TOPICAL 2 TIMES DAILY PRN
COMMUNITY
Start: 2023-03-09

## 2023-04-26 NOTE — ASSESSMENT & PLAN NOTE
-Stage 1 papillary thyroid cancer s/p thyroidectomy and radioiodine in 2012   -History of elevated thyroglobulin and ultrasound with possible residual thyroid tissue but no lymphadenopathy   TG trending downward and stable. Repeat TG in one year  US in 2023 stable 0.4 cm in right bed and 1.5 cm in left bed  Repeat US in one year

## 2023-04-26 NOTE — ASSESSMENT & PLAN NOTE
Change levothyroxine 125 mcg 1 tab Monday-Saturday and 1/2 tab on Sunday.   Check labs in 6 weeks  - Will repeat TSH and target a goal of 0.5-2

## 2023-04-26 NOTE — ASSESSMENT & PLAN NOTE
-- Reviewed goals of therapy are to get the best control we can without hypoglycemia  Medication changes:     A1c at goal   Continue Metformin  mg twice daily  Increase Mounjaro 10 mg weekly   Stop glimepiride      -- Advised frequent self blood glucose monitoring.  Patient encouraged to document glucose results and bring them to every clinic visit    -- Hypoglycemia precautions discussed. Instructed on precautions before driving.    -- Call for Bg repeatedly < 90 or > 180.   -- Close adherence to lifestyle changes recommended.   -- Periodic follow ups for eye evaluations, foot care and dental care suggested.

## 2023-04-26 NOTE — PATIENT INSTRUCTIONS
Thyroid Cancer  Change levothyroxine 125 mcg 1 tab Monday-Saturday and 1/2 tab on Sunday.   Check labs in 6 weeks    Diabetes  A1c at goal   Continue Metformin  mg twice daily  Increase Mounjaro 10 mg weekly   Stop glimepiride

## 2023-04-27 ENCOUNTER — TELEPHONE (OUTPATIENT)
Dept: ENDOCRINOLOGY | Facility: CLINIC | Age: 63
End: 2023-04-27
Payer: COMMERCIAL

## 2023-04-27 DIAGNOSIS — E11.65 UNCONTROLLED TYPE 2 DIABETES MELLITUS WITH HYPERGLYCEMIA, WITHOUT LONG-TERM CURRENT USE OF INSULIN: Primary | ICD-10-CM

## 2023-04-27 NOTE — TELEPHONE ENCOUNTER
Called patient to let her know the provider put in her order for her medication 7.5 and to try 10mg next month. Calling to confirm the medication been picked up.

## 2023-06-12 ENCOUNTER — LAB VISIT (OUTPATIENT)
Dept: LAB | Facility: HOSPITAL | Age: 63
End: 2023-06-12
Attending: INTERNAL MEDICINE
Payer: COMMERCIAL

## 2023-06-12 ENCOUNTER — TELEPHONE (OUTPATIENT)
Dept: ENDOCRINOLOGY | Facility: CLINIC | Age: 63
End: 2023-06-12
Payer: COMMERCIAL

## 2023-06-12 DIAGNOSIS — E11.65 UNCONTROLLED TYPE 2 DIABETES MELLITUS WITH HYPERGLYCEMIA, WITHOUT LONG-TERM CURRENT USE OF INSULIN: Chronic | ICD-10-CM

## 2023-06-12 LAB
T4 FREE SERPL-MCNC: 1.2 NG/DL (ref 0.71–1.51)
TSH SERPL DL<=0.005 MIU/L-ACNC: 0.11 UIU/ML (ref 0.4–4)

## 2023-06-12 PROCEDURE — 84443 ASSAY THYROID STIM HORMONE: CPT | Performed by: NURSE PRACTITIONER

## 2023-06-12 PROCEDURE — 36415 COLL VENOUS BLD VENIPUNCTURE: CPT | Performed by: NURSE PRACTITIONER

## 2023-06-12 PROCEDURE — 84439 ASSAY OF FREE THYROXINE: CPT | Performed by: NURSE PRACTITIONER

## 2023-06-12 NOTE — TELEPHONE ENCOUNTER
Spoke with patient about her slip and fall. I informed her to go to the ER if any pain. She said she's not in pain much just keep falling. I informed her we will be giving her a call to schedule her 6 month follow up. She wanted to see the provider today but I informed her of the full clinic we have.

## 2023-06-13 ENCOUNTER — PATIENT MESSAGE (OUTPATIENT)
Dept: ENDOCRINOLOGY | Facility: CLINIC | Age: 63
End: 2023-06-13
Payer: COMMERCIAL

## 2023-06-13 ENCOUNTER — TELEPHONE (OUTPATIENT)
Dept: ENDOCRINOLOGY | Facility: CLINIC | Age: 63
End: 2023-06-13
Payer: COMMERCIAL

## 2023-06-13 NOTE — TELEPHONE ENCOUNTER
Called to confirm dose and direction of levothyroxine, patient states she will call me back she was driving, unsure of the dosage but she does take 1 pill Monday-Saturday and a 1/2 pill on Sunday.

## 2023-06-13 NOTE — TELEPHONE ENCOUNTER
----- Message from Petra Rizzo NP sent at 6/13/2023  8:07 AM CDT -----  Please call her and find out if she is taking   levothyroxine 125 mcg 1 tab Monday-Saturday and 1/2 tab on Sunday.  Let me know.

## 2023-06-14 ENCOUNTER — TELEPHONE (OUTPATIENT)
Dept: ENDOCRINOLOGY | Facility: CLINIC | Age: 63
End: 2023-06-14
Payer: COMMERCIAL

## 2023-06-14 ENCOUNTER — PATIENT MESSAGE (OUTPATIENT)
Dept: ENDOCRINOLOGY | Facility: CLINIC | Age: 63
End: 2023-06-14
Payer: COMMERCIAL

## 2023-06-14 DIAGNOSIS — E11.65 UNCONTROLLED TYPE 2 DIABETES MELLITUS WITH HYPERGLYCEMIA, WITHOUT LONG-TERM CURRENT USE OF INSULIN: Primary | ICD-10-CM

## 2023-06-14 DIAGNOSIS — E89.0 POSTOPERATIVE HYPOTHYROIDISM: ICD-10-CM

## 2023-06-14 RX ORDER — LEVOTHYROXINE SODIUM 112 UG/1
112 TABLET ORAL
Qty: 30 TABLET | Refills: 11 | Status: SHIPPED | OUTPATIENT
Start: 2023-06-14 | End: 2023-08-18

## 2023-06-14 NOTE — TELEPHONE ENCOUNTER
----- Message from Norma Roy MA sent at 6/14/2023  8:13 AM CDT -----    ----- Message -----  From: Kasia Bonds MA  Sent: 6/13/2023   5:09 PM CDT  To: Norma Roy MA      ----- Message -----  From: Petra Rizzo NP  Sent: 6/13/2023   8:07 AM CDT  To: [facility] Staff    Please call her and find out if she is taking   levothyroxine 125 mcg 1 tab Monday-Saturday and 1/2 tab on Sunday.  Let me know.

## 2023-06-14 NOTE — TELEPHONE ENCOUNTER
----- Message from Sendy Hanson sent at 6/14/2023  9:02 AM CDT -----  Regarding: returning call  Contact: pt [phone]  Who Called:Shelly    Who Left Message for Patient:Hannah    Does the patient know what this is regarding?:to find out how she is taking her thyroid medication    Would the patient rather a call back or a response via [facility]? Call back    Best Call Back Number:[phone]    Additional Information:

## 2023-06-14 NOTE — TELEPHONE ENCOUNTER
Called patient to find out how she is taking her thyroid medication, per Petra. No response, unable to leave a voice msg.

## 2023-06-20 ENCOUNTER — TELEPHONE (OUTPATIENT)
Dept: ENDOCRINOLOGY | Facility: CLINIC | Age: 63
End: 2023-06-20
Payer: COMMERCIAL

## 2023-07-17 DIAGNOSIS — E78.2 MIXED HYPERLIPIDEMIA: ICD-10-CM

## 2023-07-17 DIAGNOSIS — E11.65 UNCONTROLLED TYPE 2 DIABETES MELLITUS WITH HYPERGLYCEMIA, WITHOUT LONG-TERM CURRENT USE OF INSULIN: Primary | ICD-10-CM

## 2023-07-18 RX ORDER — BENAZEPRIL HYDROCHLORIDE 20 MG/1
TABLET ORAL
Qty: 30 TABLET | Refills: 3 | Status: SHIPPED | OUTPATIENT
Start: 2023-07-18

## 2023-07-18 RX ORDER — EZETIMIBE 10 MG/1
10 TABLET ORAL DAILY
Qty: 90 TABLET | Refills: 3 | Status: SHIPPED | OUTPATIENT
Start: 2023-07-18 | End: 2024-07-17

## 2023-07-19 DIAGNOSIS — E11.65 UNCONTROLLED TYPE 2 DIABETES MELLITUS WITH HYPERGLYCEMIA, WITHOUT LONG-TERM CURRENT USE OF INSULIN: Chronic | ICD-10-CM

## 2023-07-19 RX ORDER — METFORMIN HYDROCHLORIDE 500 MG/1
TABLET, EXTENDED RELEASE ORAL
Qty: 360 TABLET | Refills: 1 | Status: SHIPPED | OUTPATIENT
Start: 2023-07-19 | End: 2023-12-26 | Stop reason: SDUPTHER

## 2023-07-20 DIAGNOSIS — E11.65 UNCONTROLLED TYPE 2 DIABETES MELLITUS WITH HYPERGLYCEMIA, WITHOUT LONG-TERM CURRENT USE OF INSULIN: Chronic | ICD-10-CM

## 2023-07-20 RX ORDER — GLIMEPIRIDE 2 MG/1
2 TABLET ORAL
Qty: 90 TABLET | Refills: 2 | Status: SHIPPED | OUTPATIENT
Start: 2023-07-20 | End: 2023-08-17

## 2023-08-09 ENCOUNTER — LAB VISIT (OUTPATIENT)
Dept: LAB | Facility: HOSPITAL | Age: 63
End: 2023-08-09
Payer: COMMERCIAL

## 2023-08-09 DIAGNOSIS — E89.0 POSTOPERATIVE HYPOTHYROIDISM: ICD-10-CM

## 2023-08-09 LAB
T4 FREE SERPL-MCNC: 1.02 NG/DL (ref 0.71–1.51)
TSH SERPL DL<=0.005 MIU/L-ACNC: 0.07 UIU/ML (ref 0.4–4)

## 2023-08-09 PROCEDURE — 36415 COLL VENOUS BLD VENIPUNCTURE: CPT | Performed by: NURSE PRACTITIONER

## 2023-08-09 PROCEDURE — 84439 ASSAY OF FREE THYROXINE: CPT | Performed by: NURSE PRACTITIONER

## 2023-08-09 PROCEDURE — 84443 ASSAY THYROID STIM HORMONE: CPT | Performed by: NURSE PRACTITIONER

## 2023-08-16 NOTE — PROGRESS NOTES
Subjective:      Chief Complaint: post surgical hypothyroidism and diabetes mellitus type 2    HPI: Rand Kaye is a 62 y.o. female who is here for a follow up evaluation of papillary thyroid cancer and diabetes mellitus type 2. Frequently lost to follow up.  Previous patient of Dr. Whitten. Last visit in March 2023    She is accompanied by her daughter, Didier.     Regarding her history of thyroid cancer:  -She had thyroidectomy in 2012 for multi-nodular goiter with compressive symptoms. Incidentally found to have a 1 cm papillary thyroid cancer in left lobe after surgery.   She received 30 mCi iodine after surgery, but did not show for WBS.      Latest Reference Range & Units 01/28/23 09:16 03/09/23 15:04   Thyroglobulin Interpretation  SEE BELOW SEE BELOW   Thyroglobulin Antibody Screen <1.8 IU/mL <1.8 <1.8   Thyroglobulin, Tumor Marker ng/mL 14 (H) 2.5 (H)   (H): Data is abnormally high    Lab Results   Component Value Date    TSH 0.072 (L) 08/09/2023    FREET4 1.02 08/09/2023     Ultrasound Neck 4/2023  FINDINGS:  THYROID GLAND has been surgically removed.   Sonographic examination of neck compartments II through V was carried out.  No pathologic lymphadenopathy identified.   There is a 0.4 x 0.6 x 0.4 cm hypoechoic focus in the right inferior thyroid bed.  There is no vascularity present, borders are well defined.  No microcalcification's are seen. This focus measured 0.5 x 0.5 x 0.4 cm on previous ultrasound.   There is a 1.5 x 1.0 x 0.93 cm hypoechoic focus in the left middle thyroid bed, likely residual thyroid tissue.  Vascularity is peripheral, borders are well defined.  No microcalcification's are seen.  This focus measured 1.5 x 0.8 x 0.4 cm on previous ultrasound.   Impression:   Status post thyroidectomy.   No sonographic evidence of malignancy.   Recommend to continue correlation with thyroglobulin levels and repeat neck ultrasound in 1 year to follow the 0.4 cm hypoechoic focus in the right  inferior thyroid bed and the 1.5 cm hypoechoic focus in the left middle thyroid bed.    CT neck   9/2018  1.2 cm enhancing structure in the left thyroid resection bed.  Findings are nonspecific and may represent residual thyroid tissue or vascular structure, however in this patient with a history of papillary thyroid carcinoma continued surveillance is recommended.     Pathology      Now regarding Post-Surgical Hypothyroidism  -Patient presents for evaluation of thyroid function. She had thyroidectomy in 2012 for multi-nodular goiter with compressive symptoms. Incidentally found to have a 1 cm papillary thyroid cancer in left lobe after surgery. She received 30 mCi iodine after surgery, but did not show for WBS.     -Current medication is levothyroxine 125 mcg daily but did not read portal message in June 2023 to reduce to 112 mcg daily  Not missing doses     -Current symptoms:     Jan 2023)    No   Yes    [x]    []   Weight gain    [x]    []   Fatigue    [x]    []   Constipation    [x]    []   Hair loss    [x]    []   Brittle nails    [x]    []   Mental fog    [x]    []   Cold intolerance    [x]    []   Memory impair    [x]    []   Muscle weakness    [x]    []   Neck swelling    [x]    []   Hoarseness    [x]    []   Periorbital edema    [x]    []   Lithium or Amiodarone use    [x]    []   Recent severe illness      Denies palpitations or tremors     Lab Results   Component Value Date    TSH 0.072 (L) 08/09/2023    TSH 0.109 (L) 06/12/2023    TSH 0.053 (L) 04/21/2023    FREET4 1.02 08/09/2023    FREET4 1.20 06/12/2023    FREET4 1.13 04/21/2023    THYROPEROXID < 6.0 01/23/2012     Now Regarding diabetes:  -Patient was initially diagnosed with Type 2 diabetes mellitus in 2009  Denies DKA  -Diabetic complications present  She has eye exam scheduled  Neuropathy :tolerable  Nephropathy :+  CAD: denies    Diabetes History in Family  Father had Type 2 Diabetes Mellitus    -Denies history of pancreatitis, medullary thyroid  cancer, recurrent UTI or recurrent fungal infection     Current diabetic medications include:   Metformin  mg twice daily (tries to increase and has severe diarrhea)  Mounjaro 7.5 mg weekly (no intolerable side effects) or Mounjaro 10 mg weekly if they have in pharmacy   Glimepiride 1 mg daily    Denies missing doses of above medications    Other medications tried  Ozempic prescribed but did not start  Does not remember being on insulin  States she is  and cannot start insulin    Blood Sugar Range   3-4 times a week  Had ron but not covered  States blood sugars are ranging 120s  No blood sugars <180    Hypoglycemic Episodes:  Denies it and denies s/s of hypoglycemia    Diet  Meals are: She has changed her diet since her last visit. Has felt satiety effects from Mounjaro  Breakfast: cornflakes or oatmeal; tuna with crackers   Lunch: beans and rice or spaghetti   Dinner: skipped or same as lunch  Eating 3 meals per day  Snacks: protein packs   Drinking diet coke, powerade, water and lemon tea     Does patient count carbohydrates? no    Exercise: She bought a treadmill in the evenings 3 days a week for 30 minutes but not using yet  Walks, [place]    Diabetes education on 2/23/2023    Screening for Complications:    Dyslipidemia   Statin: Taking Atorvastatin 20 mg daily, LDL above goal at 145  Nephropathy  ACE/ARB: Taking Benazepril 20 mg daily    Lab Results   Component Value Date    HGBA1C 6.3 (H) 04/21/2023    HGBA1C 10.4 (H) 01/28/2023    HGBA1C 9.5 (H) 07/05/2022     Diabetes Management Status  Screening or Prevention Patient's value Goal Complete/Controlled?   HgA1C Testing and Control   Lab Results   Component Value Date    HGBA1C 6.3 (H) 04/21/2023      Annually/Less than 8% No   Lipid profile : 01/28/2023 Annually Yes   LDL control Lab Results   Component Value Date    LDLCALC 89.6 01/28/2023    Annually/Less than 100 mg/dl  No   Nephropathy screening Lab Results   Component Value Date     LABMICR 680.0 01/28/2023     Lab Results   Component Value Date    PROTEINUA Negative 04/19/2012    Annually Yes   Blood pressure BP Readings from Last 1 Encounters:   08/17/23 136/78    Less than 140/90 No   Dilated retinal exam : 09/24/2018 Annually No   Foot exam   : 01/26/2023 Annually No     With regards to the hypercalcemia or primary hyperparathyroidism:    Lab Results   Component Value Date    PTH 53 04/19/2012    CALCIUM 10.1 04/21/2023    CAION 1.11 03/27/2012    PHOS 3.1 01/18/2019     Lab Results   Component Value Date    ALBUMIN 4.5 04/21/2023      Latest Reference Range & Units 01/23/12 11:28 09/10/16 09:59 01/28/23 09:16   Vit D, 25-Hydroxy 30 - 96 ng/mL 18 (L) 43 52   (L): Data is abnormally low        Daily intake of calcium is: none  Taking vitamin D: OTC 1000 IU daily    Denies constipation, depression, polyuria, muscle aches or pains.    Last bmd was: denies   Denies fractures, height loss or kidney stones  Denies history of renal disease.   There is no family history of hyperparathyroidism or calcium problems as far as she knows.  Denies HCTZ., lithium,   + chlorthiadone use.    5 indications for surgery:  CKD  <50 yoa  + kidney stones  Serum Ca > 11.5  Urine Ca >400   Osteoporosis    Review of Systems   Constitutional:  Negative for fatigue and unexpected weight change.   Eyes:  Negative for visual disturbance.   Endocrine: Negative for polydipsia, polyphagia and polyuria.      as above    Objective:     Vitals:    08/17/23 1303   BP: 136/78   Pulse: (!) 54       BP Readings from Last 5 Encounters:   08/17/23 136/78   07/27/23 (!) 149/87   04/26/23 (!) 142/78   03/09/23 124/74   01/26/23 130/78     Wt Readings from Last 3 Encounters:   08/17/23 1303 91 kg (200 lb 11.7 oz)   07/27/23 1049 91.8 kg (202 lb 6.1 oz)   04/26/23 1327 93.8 kg (206 lb 10.9 oz)     Physical Exam  Constitutional:       Appearance: She is obese.   Neck:      Comments: No thyroid tissue  Pulmonary:      Effort: Pulmonary  effort is normal.   Neurological:      Mental Status: She is alert.     Diabetes Foot Exam:   No sores or lesions to bilateral feet  Shoes appropriate  DM foot exam done in Jan 2023    Wt Readings from Last 10 Encounters:   08/17/23 1303 91 kg (200 lb 11.7 oz)   07/27/23 1049 91.8 kg (202 lb 6.1 oz)   04/26/23 1327 93.8 kg (206 lb 10.9 oz)   03/09/23 1412 95.8 kg (211 lb 5 oz)   02/23/23 1353 97.2 kg (214 lb 4.6 oz)   01/26/23 1307 99.8 kg (220 lb 0.3 oz)   07/07/22 1003 102.2 kg (225 lb 5 oz)   06/01/22 1430 103.5 kg (228 lb 2.8 oz)   05/13/22 0730 101.2 kg (223 lb)   05/12/22 1406 101.2 kg (223 lb 1.7 oz)     Lab Results   Component Value Date    HGBA1C 6.3 (H) 04/21/2023     Lab Results   Component Value Date    CHOL 180 01/28/2023    HDL 70 01/28/2023    LDLCALC 89.6 01/28/2023    TRIG 102 01/28/2023    CHOLHDL 38.9 01/28/2023     Lab Results   Component Value Date     04/21/2023    K 3.4 (L) 04/21/2023     04/21/2023    CO2 31 (H) 04/21/2023     04/21/2023    BUN 14 04/21/2023    CREATININE 0.87 04/21/2023    CALCIUM 10.1 04/21/2023    PROT 7.6 04/21/2023    ALBUMIN 4.5 04/21/2023    BILITOT 0.4 04/21/2023    ALKPHOS 54 04/21/2023    AST 29 04/21/2023    ALT 36 04/21/2023    ANIONGAP 7 (L) 04/21/2023    ESTGFRAFRICA >60.0 02/18/2022    EGFRNONAA >60.0 02/18/2022    TSH 0.072 (L) 08/09/2023      Lab Results   Component Value Date    MICALBCREAT 309.1 (H) 01/28/2023     Assessment/Plan:     1. Hypercalcemia  Renal Function Panel      2. Controlled type 2 diabetes mellitus without complication, without long-term current use of insulin  Hemoglobin A1C      3. Postoperative hypothyroidism  TSH      4. History of papillary adenocarcinoma of thyroid        5. Essential hypertension        6. Mixed hyperlipidemia          Controlled type 2 diabetes mellitus, without long-term current use of insulin  -- Reviewed goals of therapy are to get the best control we can without hypoglycemia    A1c at goal    I suspect she is having hypoglycemia     Medication Changes   Continue Metformin  mg twice daily  Increase Mounjaro 10 mg weekly when you find supply or stay on Mounjaro 7.5 mg weekly  Decrease glimepiride 0.5 mg daily    Discussed we may stop glimepiride prior to next visit     -- Advised frequent self blood glucose monitoring.  Patient encouraged to document glucose results and bring them to every clinic visit    -- Hypoglycemia precautions discussed. Instructed on precautions before driving.    -- Call for Bg repeatedly < 90 or > 180.   -- Close adherence to lifestyle changes recommended.   -- Periodic follow ups for eye evaluations, foot care and dental care suggested.    Postoperative hypothyroidism  - Will repeat TSH and target a goal of 0.5-2    Check thyroid US in April 2024    Last note regarding thyroid level instructed her to decrease thyroid hormone to Levothyroxine 112 mcg daily but she did not read. She picked up 90 day supply of last prescription and thinks she is still taking levothyroxine 125 mcg daily. She will go home and message or call me to let me know what she is taking. Discussed she is on too much thyroid medication    Check labs in 8 weeks    History of papillary adenocarcinoma of thyroid  -Stage 1 papillary thyroid cancer s/p thyroidectomy and radioiodine in 2012   -History of elevated thyroglobulin and ultrasound with possible residual thyroid tissue but no lymphadenopathy   TG trending downward and stable. Repeat TG in one year  US in 2023 stable 0.4 cm in right bed and 1.5 cm in left bed  Repeat US in one year in April 2024      Essential hypertension  On ACEI    Mixed hyperlipidemia  On statin   Goal LDL <70    Follow up in about 4 months (around 12/17/2023).  Labs in 8 weeks

## 2023-08-16 NOTE — ASSESSMENT & PLAN NOTE
-Stage 1 papillary thyroid cancer s/p thyroidectomy and radioiodine in 2012   -History of elevated thyroglobulin and ultrasound with possible residual thyroid tissue but no lymphadenopathy   TG trending downward and stable. Repeat TG in one year  US in 2023 stable 0.4 cm in right bed and 1.5 cm in left bed  Repeat US in one year in April 2024

## 2023-08-16 NOTE — ASSESSMENT & PLAN NOTE
-- Reviewed goals of therapy are to get the best control we can without hypoglycemia    A1c at goal   I suspect she is having hypoglycemia     Medication Changes   Continue Metformin  mg twice daily  Increase Mounjaro 10 mg weekly when you find supply or stay on Mounjaro 7.5 mg weekly  Decrease glimepiride 0.5 mg daily    Discussed we may stop glimepiride prior to next visit     -- Advised frequent self blood glucose monitoring.  Patient encouraged to document glucose results and bring them to every clinic visit    -- Hypoglycemia precautions discussed. Instructed on precautions before driving.    -- Call for Bg repeatedly < 90 or > 180.   -- Close adherence to lifestyle changes recommended.   -- Periodic follow ups for eye evaluations, foot care and dental care suggested.

## 2023-08-16 NOTE — ASSESSMENT & PLAN NOTE
- Will repeat TSH and target a goal of 0.5-2    Check thyroid US in April 2024    Last note regarding thyroid level instructed her to decrease thyroid hormone to Levothyroxine 112 mcg daily but she did not read. She picked up 90 day supply of last prescription and thinks she is still taking levothyroxine 125 mcg daily. She will go home and message or call me to let me know what she is taking. Discussed she is on too much thyroid medication    Check labs in 8 weeks

## 2023-08-17 ENCOUNTER — OFFICE VISIT (OUTPATIENT)
Dept: ENDOCRINOLOGY | Facility: CLINIC | Age: 63
End: 2023-08-17
Payer: COMMERCIAL

## 2023-08-17 VITALS
DIASTOLIC BLOOD PRESSURE: 78 MMHG | BODY MASS INDEX: 28.1 KG/M2 | HEART RATE: 54 BPM | SYSTOLIC BLOOD PRESSURE: 136 MMHG | OXYGEN SATURATION: 98 % | HEIGHT: 71 IN | WEIGHT: 200.75 LBS

## 2023-08-17 DIAGNOSIS — Z85.850 HISTORY OF PAPILLARY ADENOCARCINOMA OF THYROID: ICD-10-CM

## 2023-08-17 DIAGNOSIS — E11.9 CONTROLLED TYPE 2 DIABETES MELLITUS WITHOUT COMPLICATION, WITHOUT LONG-TERM CURRENT USE OF INSULIN: ICD-10-CM

## 2023-08-17 DIAGNOSIS — E83.52 HYPERCALCEMIA: Primary | ICD-10-CM

## 2023-08-17 DIAGNOSIS — E89.0 POSTOPERATIVE HYPOTHYROIDISM: ICD-10-CM

## 2023-08-17 DIAGNOSIS — E78.2 MIXED HYPERLIPIDEMIA: ICD-10-CM

## 2023-08-17 DIAGNOSIS — I10 ESSENTIAL HYPERTENSION: ICD-10-CM

## 2023-08-17 PROCEDURE — 1159F PR MEDICATION LIST DOCUMENTED IN MEDICAL RECORD: ICD-10-PCS | Mod: CPTII,S$GLB,, | Performed by: NURSE PRACTITIONER

## 2023-08-17 PROCEDURE — 3075F PR MOST RECENT SYSTOLIC BLOOD PRESS GE 130-139MM HG: ICD-10-PCS | Mod: CPTII,S$GLB,, | Performed by: NURSE PRACTITIONER

## 2023-08-17 PROCEDURE — 3062F PR POS MACROALBUMINURIA RESULT DOCUMENTED/REVIEW: ICD-10-PCS | Mod: CPTII,S$GLB,, | Performed by: NURSE PRACTITIONER

## 2023-08-17 PROCEDURE — 1159F MED LIST DOCD IN RCRD: CPT | Mod: CPTII,S$GLB,, | Performed by: NURSE PRACTITIONER

## 2023-08-17 PROCEDURE — 99214 OFFICE O/P EST MOD 30 MIN: CPT | Mod: S$GLB,,, | Performed by: NURSE PRACTITIONER

## 2023-08-17 PROCEDURE — 3062F POS MACROALBUMINURIA REV: CPT | Mod: CPTII,S$GLB,, | Performed by: NURSE PRACTITIONER

## 2023-08-17 PROCEDURE — 4010F PR ACE/ARB THEARPY RXD/TAKEN: ICD-10-PCS | Mod: CPTII,S$GLB,, | Performed by: NURSE PRACTITIONER

## 2023-08-17 PROCEDURE — 3066F PR DOCUMENTATION OF TREATMENT FOR NEPHROPATHY: ICD-10-PCS | Mod: CPTII,S$GLB,, | Performed by: NURSE PRACTITIONER

## 2023-08-17 PROCEDURE — 3075F SYST BP GE 130 - 139MM HG: CPT | Mod: CPTII,S$GLB,, | Performed by: NURSE PRACTITIONER

## 2023-08-17 PROCEDURE — 99999 PR PBB SHADOW E&M-EST. PATIENT-LVL III: ICD-10-PCS | Mod: PBBFAC,,, | Performed by: NURSE PRACTITIONER

## 2023-08-17 PROCEDURE — 4010F ACE/ARB THERAPY RXD/TAKEN: CPT | Mod: CPTII,S$GLB,, | Performed by: NURSE PRACTITIONER

## 2023-08-17 PROCEDURE — 1160F PR REVIEW ALL MEDS BY PRESCRIBER/CLIN PHARMACIST DOCUMENTED: ICD-10-PCS | Mod: CPTII,S$GLB,, | Performed by: NURSE PRACTITIONER

## 2023-08-17 PROCEDURE — 3044F PR MOST RECENT HEMOGLOBIN A1C LEVEL <7.0%: ICD-10-PCS | Mod: CPTII,S$GLB,, | Performed by: NURSE PRACTITIONER

## 2023-08-17 PROCEDURE — 3044F HG A1C LEVEL LT 7.0%: CPT | Mod: CPTII,S$GLB,, | Performed by: NURSE PRACTITIONER

## 2023-08-17 PROCEDURE — 3066F NEPHROPATHY DOC TX: CPT | Mod: CPTII,S$GLB,, | Performed by: NURSE PRACTITIONER

## 2023-08-17 PROCEDURE — 99999 PR PBB SHADOW E&M-EST. PATIENT-LVL III: CPT | Mod: PBBFAC,,, | Performed by: NURSE PRACTITIONER

## 2023-08-17 PROCEDURE — 3078F PR MOST RECENT DIASTOLIC BLOOD PRESSURE < 80 MM HG: ICD-10-PCS | Mod: CPTII,S$GLB,, | Performed by: NURSE PRACTITIONER

## 2023-08-17 PROCEDURE — 3008F PR BODY MASS INDEX (BMI) DOCUMENTED: ICD-10-PCS | Mod: CPTII,S$GLB,, | Performed by: NURSE PRACTITIONER

## 2023-08-17 PROCEDURE — 3078F DIAST BP <80 MM HG: CPT | Mod: CPTII,S$GLB,, | Performed by: NURSE PRACTITIONER

## 2023-08-17 PROCEDURE — 1160F RVW MEDS BY RX/DR IN RCRD: CPT | Mod: CPTII,S$GLB,, | Performed by: NURSE PRACTITIONER

## 2023-08-17 PROCEDURE — 3008F BODY MASS INDEX DOCD: CPT | Mod: CPTII,S$GLB,, | Performed by: NURSE PRACTITIONER

## 2023-08-17 PROCEDURE — 99214 PR OFFICE/OUTPT VISIT, EST, LEVL IV, 30-39 MIN: ICD-10-PCS | Mod: S$GLB,,, | Performed by: NURSE PRACTITIONER

## 2023-08-17 RX ORDER — GLIMEPIRIDE 1 MG/1
1 TABLET ORAL EVERY MORNING
COMMUNITY
Start: 2023-06-15 | End: 2024-01-03

## 2023-08-17 NOTE — PATIENT INSTRUCTIONS
Thyroid Cancer  Check thyroid US in April 2024    Last note regarding thyroid level instructed her to decrease thyroid hormone to Levothyroxine 112 mcg daily but she did not read. She picked up 90 day supply of last prescription and thinks she is still taking levothyroxine 125 mcg daily. She will go home and message or call me to let me know what she is taking. Discussed she is on too much thyroid medication    Check labs in 8 weeks    Diabetes  A1c at goal   I suspect she is having hypoglycemia     Medication Changes   Continue Metformin  mg twice daily  Increase Mounjaro 10 mg weekly when you find supply or stay on Mounjaro 7.5 mg weekly  Decrease glimepiride 0.5 mg daily    Discussed we may stop glimepiride prior to next visit    Borderline High Calcium   On chlorthalidone    Stay hydrated   Watch calcium levels

## 2023-08-18 DIAGNOSIS — E89.0 POSTOPERATIVE HYPOTHYROIDISM: ICD-10-CM

## 2023-08-18 DIAGNOSIS — E11.9 CONTROLLED TYPE 2 DIABETES MELLITUS WITHOUT COMPLICATION, WITHOUT LONG-TERM CURRENT USE OF INSULIN: Primary | ICD-10-CM

## 2023-08-18 RX ORDER — LEVOTHYROXINE SODIUM 100 UG/1
100 TABLET ORAL
Qty: 30 TABLET | Refills: 11 | Status: SHIPPED | OUTPATIENT
Start: 2023-08-18 | End: 2023-12-19

## 2023-08-18 NOTE — PROGRESS NOTES
Patient called. She is taking LT4 112 mcg daily.   Will reduce to LT4 100 mcg daily and recheck labs in 6 weeks      Latest Reference Range & Units 06/12/23 15:31 08/09/23 10:26   TSH 0.400 - 4.000 uIU/mL 0.109 (L) 0.072 (L)   Free T4 0.71 - 1.51 ng/dL 1.20 1.02   (L): Data is abnormally low

## 2023-09-02 DIAGNOSIS — E11.65 UNCONTROLLED TYPE 2 DIABETES MELLITUS WITH HYPERGLYCEMIA, WITHOUT LONG-TERM CURRENT USE OF INSULIN: ICD-10-CM

## 2023-09-04 RX ORDER — TIRZEPATIDE 7.5 MG/.5ML
INJECTION, SOLUTION SUBCUTANEOUS
Qty: 4 PEN | Refills: 3 | Status: SHIPPED | OUTPATIENT
Start: 2023-09-04 | End: 2023-10-17 | Stop reason: SDUPTHER

## 2023-10-17 DIAGNOSIS — E11.65 UNCONTROLLED TYPE 2 DIABETES MELLITUS WITH HYPERGLYCEMIA, WITHOUT LONG-TERM CURRENT USE OF INSULIN: ICD-10-CM

## 2023-10-17 RX ORDER — TIRZEPATIDE 7.5 MG/.5ML
INJECTION, SOLUTION SUBCUTANEOUS
Qty: 4 PEN | Refills: 3 | Status: SHIPPED | OUTPATIENT
Start: 2023-10-17 | End: 2023-10-25 | Stop reason: SDUPTHER

## 2023-10-25 DIAGNOSIS — E11.65 UNCONTROLLED TYPE 2 DIABETES MELLITUS WITH HYPERGLYCEMIA, WITHOUT LONG-TERM CURRENT USE OF INSULIN: ICD-10-CM

## 2023-10-25 RX ORDER — TIRZEPATIDE 7.5 MG/.5ML
INJECTION, SOLUTION SUBCUTANEOUS
Qty: 4 PEN | Refills: 3 | Status: SHIPPED | OUTPATIENT
Start: 2023-10-25 | End: 2024-01-18 | Stop reason: SDUPTHER

## 2023-11-09 ENCOUNTER — HOSPITAL ENCOUNTER (EMERGENCY)
Facility: HOSPITAL | Age: 63
Discharge: HOME | End: 2023-11-09
Attending: EMERGENCY MEDICINE
Payer: COMMERCIAL

## 2023-11-09 VITALS
OXYGEN SATURATION: 98 % | HEIGHT: 70 IN | DIASTOLIC BLOOD PRESSURE: 77 MMHG | RESPIRATION RATE: 16 BRPM | TEMPERATURE: 98 F | BODY MASS INDEX: 27.49 KG/M2 | SYSTOLIC BLOOD PRESSURE: 142 MMHG | WEIGHT: 192 LBS | HEART RATE: 66 BPM

## 2023-11-09 DIAGNOSIS — G89.29 CHRONIC PAIN OF BOTH KNEES: Primary | ICD-10-CM

## 2023-11-09 DIAGNOSIS — M25.562 CHRONIC PAIN OF BOTH KNEES: Primary | ICD-10-CM

## 2023-11-09 DIAGNOSIS — M25.561 CHRONIC PAIN OF BOTH KNEES: Primary | ICD-10-CM

## 2023-11-09 PROCEDURE — 63600175 PHARM REV CODE 636 W HCPCS: Performed by: PHYSICIAN ASSISTANT

## 2023-11-09 PROCEDURE — 99284 EMERGENCY DEPT VISIT MOD MDM: CPT

## 2023-11-09 PROCEDURE — 96372 THER/PROPH/DIAG INJ SC/IM: CPT | Performed by: PHYSICIAN ASSISTANT

## 2023-11-09 PROCEDURE — 25000003 PHARM REV CODE 250: Performed by: PHYSICIAN ASSISTANT

## 2023-11-09 RX ORDER — NAPROXEN 500 MG/1
500 TABLET ORAL 2 TIMES DAILY WITH MEALS
Qty: 14 TABLET | Refills: 0 | Status: SHIPPED | OUTPATIENT
Start: 2023-11-09 | End: 2023-11-16

## 2023-11-09 RX ORDER — ACETAMINOPHEN 500 MG
1000 TABLET ORAL
Status: COMPLETED | OUTPATIENT
Start: 2023-11-09 | End: 2023-11-09

## 2023-11-09 RX ORDER — KETOROLAC TROMETHAMINE 30 MG/ML
15 INJECTION, SOLUTION INTRAMUSCULAR; INTRAVENOUS
Status: COMPLETED | OUTPATIENT
Start: 2023-11-09 | End: 2023-11-09

## 2023-11-09 RX ADMIN — ACETAMINOPHEN 1000 MG: 500 TABLET ORAL at 11:11

## 2023-11-09 RX ADMIN — KETOROLAC TROMETHAMINE 15 MG: 30 INJECTION, SOLUTION INTRAMUSCULAR; INTRAVENOUS at 11:11

## 2023-11-09 NOTE — DISCHARGE INSTRUCTIONS
You were seen in the ED for knee pain.  You had degenerative changes on your knee x-ray recently which I suspect is the cause of her knee pain.  I have prescribed you anti-inflammatories to help with your pain you were also given a shot of an anti-inflammatory here in the ER.  Please take over-the-counter Tylenol for additional pain relief.  You will need to follow-up with your PCP and Orthopedics.  I have provided the phone number for [facility] to set an appointment as well as our Orthopedics Clinic.  You may return to ED sooner if you develop any new or worsening symptoms.

## 2023-11-09 NOTE — ED PROVIDER NOTES
"Encounter Date: 2023       History     Chief Complaint   Patient presents with    Knee Pain     Pt complains of bilateral knee pain, denies injury- states "it's probably from working"-- states she is a  and driving is hard on her knees= seen physician on Monday for same complaint states patient/ states was given IM injection=no relief     63-year-old female with past medical history of DM T2, hypertension, hyperlipidemia, thyroid cancer presents ED for bilateral knee pain.  Reports chronic pain in the left knee which is worsening and for the past 3 weeks has pain in the right knee.  Was seen in the ED recently and given a Toradol injection which she reports some relief.  Has been taking OTC Advil but continues to have worsening pain.  Denies any falls, heavy lifting or recent trauma.        Review of patient's allergies indicates:  No Known Allergies  Past Medical History:   Diagnosis Date    Diabetes mellitus type II     Fibroids     Hypertension     Mixed hyperlipidemia     Thyroid cancer 2011    thyroid     Past Surgical History:   Procedure Laterality Date    BILATERAL SALPINGOOPHORECTOMY  10/28/13    HYSTERECTOMY Bilateral 10/28/2013    supracervical with BSO secondary to uterine fibroids    OOPHORECTOMY      SALPINGOOPHORECTOMY Bilateral 10/2013    2/2 uterine fibroids    supracervical hysterectomy  10/28/13    THYROID SURGERY      TUBAL LIGATION       Family History   Problem Relation Age of Onset    Thyroid disease Mother     Heart attack Father     Diabetes type II Father     Breast cancer Neg Hx     Colon cancer Neg Hx     Ovarian cancer Neg Hx      Social History     Tobacco Use    Smoking status: Former     Current packs/day: 0.00     Average packs/day: 1 pack/day for 3.0 years (3.0 ttl pk-yrs)     Types: Cigarettes     Start date: 1990     Quit date: 1993     Years since quittin.7    Smokeless tobacco: Never   Substance Use Topics    Alcohol use: Yes     " Alcohol/week: 0.0 standard drinks of alcohol     Comment: occasionally    Drug use: No     Review of Systems   Constitutional:  Negative for chills and fever.   HENT:  Negative for sore throat.    Respiratory:  Negative for cough and shortness of breath.    Cardiovascular:  Negative for chest pain.   Gastrointestinal:  Negative for abdominal pain.   Genitourinary:  Negative for difficulty urinating.   Musculoskeletal:  Positive for arthralgias.   Neurological:  Negative for weakness.   Psychiatric/Behavioral:  Negative for confusion.        Physical Exam     Initial Vitals [11/09/23 1008]   BP Pulse Resp Temp SpO2   (!) 142/77 66 16 98.2 °F (36.8 °C) 98 %      MAP       --         Physical Exam    Nursing note and vitals reviewed.  Constitutional: She appears well-developed and well-nourished.   Eyes: Conjunctivae are normal.   Neck: Neck supple.   Normal range of motion.  Cardiovascular:  Normal rate.           Pulmonary/Chest: Breath sounds normal.   Abdominal: Abdomen is soft. There is no abdominal tenderness.   Musculoskeletal:         General: No tenderness or edema. Normal range of motion.      Cervical back: Normal range of motion and neck supple.      Comments: No swelling, erythema warmth to the bilateral knees.  No laxity on exam.  Able to flex and extend without difficulty.  No pain with varus or valgus stress.  Lower extremities are neurovascularly intact.     Neurological: She is alert and oriented to person, place, and time. GCS score is 15. GCS eye subscore is 4. GCS verbal subscore is 5. GCS motor subscore is 6.   Skin: Skin is warm and dry. Capillary refill takes less than 2 seconds.         ED Course   Procedures  Labs Reviewed - No data to display       Imaging Results    None          Medications   acetaminophen tablet 1,000 mg (has no administration in time range)   ketorolac injection 15 mg (has no administration in time range)     Medical Decision Making  63-year-old female presents ED for  chronic knee pain.      Differential includes but not limited to septic arthritis, knee strain, arthritis, knee effusion    On exam there is no significant swelling erythema or warmth and low suspicion for any infectious etiologies.  No laxity on exam and no signs of internal derangement.  Reviewed recent x-rays of the bilateral knee which did show some degenerative changes.  Suspect this is chronic and will treat with NSAIDs and patient is requesting outpatient follow-up with orthopedics.  She is also wanting to transition her primary care to [facility] will provide the [facility] phone number.  Will discharge with NSAIDs for pain.  Return ED precautions given.    Risk  OTC drugs.  Prescription drug management.                               Clinical Impression:   Final diagnoses:  [M25.561, M25.562, G89.29] Chronic pain of both knees (Primary)        ED Disposition Condition    Discharge Stable          ED Prescriptions       Medication Sig Dispense Start Date End Date Auth. Provider    naproxen (NAPROSYN) 500 MG tablet Take 1 tablet (500 mg total) by mouth 2 (two) times daily with meals. for 7 days 14 tablet 11/9/2023 11/16/2023 Odalis Bautista PA-C          Follow-up Information       Follow up With Specialties Details Why Contact Info Additional Information    [facility] - Orthoped[facility] Schedule an appointment as soon as possible for a visit   [address], [address]  [facility] [address] [phone] [facility], 5th Floor Please park in [facility] and take Atrium elevator    [facility] Schedule an appointment as soon as possible for a visit   [address] [facility] [address] [facility] [address] [phone] [facility] is located across the street from the [facility], behind the [facility], and between the Pediatrics and [facility] facilities on [address].  [facility]             Odalis Bautista PA-C  11/09/23 [id number]

## 2023-11-09 NOTE — ED NOTES
"Pt presents to ED via personal transport w/ c/o bilateral knee pain. Pt reporting left knee pain x4 months and right knee pain x3 weeks.    Patient identifiers for Radn Kaye 63 y.o. female checked and correct.  Chief Complaint   Patient presents with    Knee Pain     Pt complains of bilateral knee pain, denies injury- states "it's probably from working"-- states she is a  and driving is hard on her knees= seen physician on Monday for same complaint states patient/ states was given IM injection=no relief     Past Medical History:   Diagnosis Date    Diabetes mellitus type II     Fibroids     Hypertension     Mixed hyperlipidemia     Thyroid cancer 01/01/2011    thyroid     Allergies reported: Review of patient's allergies indicates:  No Known Allergies    LOC: Patient is awake, alert, and aware of environment with an appropriate affect. Patient is oriented x 3 and speaking appropriately.  APPEARANCE: Patient resting comfortably and in no acute distress. Patient is clean and well groomed, patient's clothing is properly fastened.  HEENT: WDL  SKIN: The skin is warm and dry. Patient has normal skin turgor and moist mucus membranes. Skin is intact; no bruising or breakdown noted.  MUSKULOSKELETAL: Difficulty w/ ambulation r/t to bilateral knee pain. No deformities noted.  RESPIRATORY: Airway is open and patent. Respirations are spontaneous and non-labored with normal effort and rate, BBS=clear  CARDIAC: No peripheral edema noted.   ABDOMEN: No distention noted. Bowel sounds active in all 4 quadrants. Soft and non-tender upon palpation.  NEUROLOGICAL: PERRL. Facial expression is symmetrical. Hand grasps are equal bilaterally. Normal sensation in all extremities when touched with finger.       "

## 2023-11-15 ENCOUNTER — TELEPHONE (OUTPATIENT)
Dept: ENDOCRINOLOGY | Facility: CLINIC | Age: 63
End: 2023-11-15
Payer: COMMERCIAL

## 2023-11-17 ENCOUNTER — OFFICE VISIT (OUTPATIENT)
Dept: ORTHOPEDICS | Facility: CLINIC | Age: 63
End: 2023-11-17
Payer: COMMERCIAL

## 2023-11-17 VITALS — WEIGHT: 192 LBS | HEIGHT: 70 IN | BODY MASS INDEX: 27.49 KG/M2

## 2023-11-17 DIAGNOSIS — M25.562 PAIN AND SWELLING OF KNEE, LEFT: ICD-10-CM

## 2023-11-17 DIAGNOSIS — M25.461 PAIN AND SWELLING OF RIGHT KNEE: Primary | ICD-10-CM

## 2023-11-17 DIAGNOSIS — M25.462 PAIN AND SWELLING OF KNEE, LEFT: ICD-10-CM

## 2023-11-17 DIAGNOSIS — M25.561 PAIN AND SWELLING OF RIGHT KNEE: Primary | ICD-10-CM

## 2023-11-17 LAB
APPEARANCE FLD: NORMAL
APPEARANCE FLD: NORMAL
BODY FLD TYPE: ABNORMAL
BODY FLD TYPE: ABNORMAL
BODY FLD TYPE: NORMAL
BODY FLD TYPE: NORMAL
COLOR FLD: YELLOW
COLOR FLD: YELLOW
CRYSTALS FLD MICRO: POSITIVE
CRYSTALS FLD MICRO: POSITIVE
GRAM STN SPEC: NORMAL
GRAM STN SPEC: NORMAL
LYMPHOCYTES NFR FLD MANUAL: 24 %
LYMPHOCYTES NFR FLD MANUAL: 26 %
MONOS+MACROS NFR FLD MANUAL: 3 %
MONOS+MACROS NFR FLD MANUAL: 5 %
NEUTROPHILS NFR FLD MANUAL: 71 %
NEUTROPHILS NFR FLD MANUAL: 71 %
WBC # FLD: NORMAL /CU MM
WBC # FLD: NORMAL /CU MM

## 2023-11-17 PROCEDURE — 1160F PR REVIEW ALL MEDS BY PRESCRIBER/CLIN PHARMACIST DOCUMENTED: ICD-10-PCS | Mod: CPTII,S$GLB,, | Performed by: NURSE PRACTITIONER

## 2023-11-17 PROCEDURE — 99204 PR OFFICE/OUTPT VISIT, NEW, LEVL IV, 45-59 MIN: ICD-10-PCS | Mod: 25,S$GLB,, | Performed by: NURSE PRACTITIONER

## 2023-11-17 PROCEDURE — 87116 MYCOBACTERIA CULTURE: CPT | Performed by: NURSE PRACTITIONER

## 2023-11-17 PROCEDURE — 87075 CULTR BACTERIA EXCEPT BLOOD: CPT | Performed by: NURSE PRACTITIONER

## 2023-11-17 PROCEDURE — 1159F PR MEDICATION LIST DOCUMENTED IN MEDICAL RECORD: ICD-10-PCS | Mod: CPTII,S$GLB,, | Performed by: NURSE PRACTITIONER

## 2023-11-17 PROCEDURE — 87102 FUNGUS ISOLATION CULTURE: CPT | Mod: 59 | Performed by: NURSE PRACTITIONER

## 2023-11-17 PROCEDURE — 3062F PR POS MACROALBUMINURIA RESULT DOCUMENTED/REVIEW: ICD-10-PCS | Mod: CPTII,S$GLB,, | Performed by: NURSE PRACTITIONER

## 2023-11-17 PROCEDURE — 89051 BODY FLUID CELL COUNT: CPT | Performed by: NURSE PRACTITIONER

## 2023-11-17 PROCEDURE — 3066F PR DOCUMENTATION OF TREATMENT FOR NEPHROPATHY: ICD-10-PCS | Mod: CPTII,S$GLB,, | Performed by: NURSE PRACTITIONER

## 2023-11-17 PROCEDURE — 3044F HG A1C LEVEL LT 7.0%: CPT | Mod: CPTII,S$GLB,, | Performed by: NURSE PRACTITIONER

## 2023-11-17 PROCEDURE — 20610 DRAIN/INJ JOINT/BURSA W/O US: CPT | Mod: 50,S$GLB,, | Performed by: NURSE PRACTITIONER

## 2023-11-17 PROCEDURE — 99999 PR PBB SHADOW E&M-EST. PATIENT-LVL V: CPT | Mod: PBBFAC,,, | Performed by: NURSE PRACTITIONER

## 2023-11-17 PROCEDURE — 3008F PR BODY MASS INDEX (BMI) DOCUMENTED: ICD-10-PCS | Mod: CPTII,S$GLB,, | Performed by: NURSE PRACTITIONER

## 2023-11-17 PROCEDURE — 20610 LARGE JOINT ASPIRATION/INJECTION: BILATERAL KNEE: ICD-10-PCS | Mod: 50,S$GLB,, | Performed by: NURSE PRACTITIONER

## 2023-11-17 PROCEDURE — 4010F ACE/ARB THERAPY RXD/TAKEN: CPT | Mod: CPTII,S$GLB,, | Performed by: NURSE PRACTITIONER

## 2023-11-17 PROCEDURE — 87205 SMEAR GRAM STAIN: CPT | Performed by: NURSE PRACTITIONER

## 2023-11-17 PROCEDURE — 3044F PR MOST RECENT HEMOGLOBIN A1C LEVEL <7.0%: ICD-10-PCS | Mod: CPTII,S$GLB,, | Performed by: NURSE PRACTITIONER

## 2023-11-17 PROCEDURE — 1160F RVW MEDS BY RX/DR IN RCRD: CPT | Mod: CPTII,S$GLB,, | Performed by: NURSE PRACTITIONER

## 2023-11-17 PROCEDURE — 99204 OFFICE O/P NEW MOD 45 MIN: CPT | Mod: 25,S$GLB,, | Performed by: NURSE PRACTITIONER

## 2023-11-17 PROCEDURE — 3066F NEPHROPATHY DOC TX: CPT | Mod: CPTII,S$GLB,, | Performed by: NURSE PRACTITIONER

## 2023-11-17 PROCEDURE — 87070 CULTURE OTHR SPECIMN AEROBIC: CPT | Performed by: NURSE PRACTITIONER

## 2023-11-17 PROCEDURE — 89060 EXAM SYNOVIAL FLUID CRYSTALS: CPT | Performed by: NURSE PRACTITIONER

## 2023-11-17 PROCEDURE — 99999 PR PBB SHADOW E&M-EST. PATIENT-LVL V: ICD-10-PCS | Mod: PBBFAC,,, | Performed by: NURSE PRACTITIONER

## 2023-11-17 PROCEDURE — 1159F MED LIST DOCD IN RCRD: CPT | Mod: CPTII,S$GLB,, | Performed by: NURSE PRACTITIONER

## 2023-11-17 PROCEDURE — 4010F PR ACE/ARB THEARPY RXD/TAKEN: ICD-10-PCS | Mod: CPTII,S$GLB,, | Performed by: NURSE PRACTITIONER

## 2023-11-17 PROCEDURE — 87206 SMEAR FLUORESCENT/ACID STAI: CPT | Mod: 91 | Performed by: NURSE PRACTITIONER

## 2023-11-17 PROCEDURE — 3008F BODY MASS INDEX DOCD: CPT | Mod: CPTII,S$GLB,, | Performed by: NURSE PRACTITIONER

## 2023-11-17 PROCEDURE — 3062F POS MACROALBUMINURIA REV: CPT | Mod: CPTII,S$GLB,, | Performed by: NURSE PRACTITIONER

## 2023-11-17 RX ORDER — TRIAMCINOLONE ACETONIDE 40 MG/ML
40 INJECTION, SUSPENSION INTRA-ARTICULAR; INTRAMUSCULAR
Status: DISCONTINUED | OUTPATIENT
Start: 2023-11-17 | End: 2023-11-17 | Stop reason: HOSPADM

## 2023-11-17 RX ORDER — LIDOCAINE HYDROCHLORIDE 10 MG/ML
4 INJECTION INFILTRATION; PERINEURAL
Status: DISCONTINUED | OUTPATIENT
Start: 2023-11-17 | End: 2023-11-17 | Stop reason: HOSPADM

## 2023-11-17 RX ADMIN — LIDOCAINE HYDROCHLORIDE 4 ML: 10 INJECTION INFILTRATION; PERINEURAL at 10:11

## 2023-11-17 RX ADMIN — TRIAMCINOLONE ACETONIDE 40 MG: 40 INJECTION, SUSPENSION INTRA-ARTICULAR; INTRAMUSCULAR at 10:11

## 2023-11-17 NOTE — PROCEDURES
Large Joint Aspiration/Injection: bilateral knee    Date/Time: 11/17/2023 10:00 AM    Performed by: Berny Thornton NP  Authorized by: Berny Thornton NP    Consent Done?:  Yes (Verbal)  Indications:  Joint swelling and pain  Site marked: the procedure site was marked    Timeout: prior to procedure the correct patient, procedure, and site was verified      Local anesthesia used?: Yes    Local anesthetic:  Lidocaine spray    Details:  Needle Size:  22 G  Ultrasonic Guidance for needle placement?: No    Approach:  Anterolateral  Location:  Knee  Laterality:  Bilateral  Site:  Bilateral knee  Medications (Right):  4 mL LIDOcaine HCL 10 mg/ml (1%) 10 mg/mL (1 %); 40 mg triamcinolone acetonide 40 mg/mL  Aspirate (Right) amount (mL):  40  Aspirate (Right):  Cloudy and yellow  Lab (Right): fluid sent for laboratory analysis    Medications (Left):  4 mL LIDOcaine HCL 10 mg/ml (1%) 10 mg/mL (1 %); 40 mg triamcinolone acetonide 40 mg/mL  Aspirate (Left) amount (mL):  20  Aspirate (Left):  Cloudy and yellow  Lab (Left): fluid sent for laboratory analysis    Patient tolerance:  Patient tolerated the procedure well with no immediate complications     An Ace wrap was applied, patient advised to wear the Ace wrap for the next 48 hours only removing at bedtime and then can remove.  I advised the patient to monitor blood sugar closely as she has a diabetic.  Last A1c was 6.8.  Advised the patient I will call her with results of the fluid analysis.

## 2023-11-17 NOTE — PROGRESS NOTES
SUBJECTIVE:     Chief Complaint & History of Present Illness:  Rand Kaye is a New 63 y.o. year old female patient here with a history of constant bilateral knee pain which started several months ago.  She reports her left knee pain is worse than the right.  She endorses her knees feel swollen.  She denies any fever chills or history of gout.  There is not a history of trauma.  The pain is located in the global aspect of the knee.  The pain is described as dull, 10/10.  There is not radiation.  There is catching or locking.  Aggravating factors include going up and down stairs, rising after sitting, standing, and walking.  Associated symptoms include effusion.  There is not numbness or tingling of the lower extremity.  There is not back pain. Previous treatments include acetaminophen, brace knee sleeve, and OTC NSAIDs which have provided minimal relief.  There is not a history of previous injury or surgery to the knee.  The patient does not use an assistive device.    Review of patient's allergies indicates:  No Known Allergies      Current Outpatient Medications   Medication Sig Dispense Refill    amLODIPine (NORVASC) 10 MG tablet Take 10 mg by mouth once daily.      amoxicillin (AMOXIL) 500 MG capsule Take 500 mg by mouth 3 (three) times daily.      aspirin (ECOTRIN) 81 MG EC tablet Take 81 mg by mouth once daily. Last dose preop was 10/23/13      atenoloL (TENORMIN) 100 MG tablet Take 100 mg by mouth once daily.      atenolol-chlorthalidone (TENORETIC) 100-25 mg per tablet TAKE 1 TABLET BY MOUTH EVERY DAY 30 tablet 0    benazepriL (LOTENSIN) 20 MG tablet Take 20 mg by mouth 30 tablet 3    betamethasone dipropionate 0.05 % cream Apply topically 2 (two) times daily as needed.      blood sugar diagnostic Strp To check BG 4 times daily, to use with insurance preferred meter 200 each 6    blood-glucose meter kit To check BG 4 times daily, to use with insurance preferred meter 1 each 1    calcipotriene  (DOVONOX) 0.005 % cream Apply topically.      cholecalciferol, vitamin D3, (VITAMIN D3) 25 mcg (1,000 unit) capsule Take 1,000 Units by mouth once daily.      diclofenac sodium (VOLTAREN) 1 % Gel Apply 2 g topically 3 (three) times daily. Apply to the area of pain 2-3x per day or night as needed 100 g 3    ezetimibe (ZETIA) 10 mg tablet Take 1 tablet (10 mg total) by mouth once daily. 90 tablet 3    fish oil-omega-3 fatty acids 300-1,000 mg capsule Take 2 g by mouth once daily.      FREESTYLE LANCETS 28 gauge lancets Apply topically 4 (four) times daily.      glimepiride (AMARYL) 1 MG tablet Take 1 mg by mouth every morning.      HYDROcodone-acetaminophen (NORCO) 7.5-325 mg per tablet Take 1 tablet by mouth every 6 (six) hours as needed for Pain. 9 tablet 0    lancets Misc To check BG 4 times daily, to use with insurance preferred meter 200 each 6    lancets Misc To check BG 4 times daily, to use with insurance preferred meter 200 each 5    levothyroxine (SYNTHROID) 100 MCG tablet Take 1 tablet (100 mcg total) by mouth before breakfast. 30 tablet 11    metFORMIN (GLUCOPHAGE) 500 MG tablet       metFORMIN (GLUCOPHAGE-XR) 500 MG ER 24hr tablet TAKE 2 TABLETS BY MOUTH TWICE A  tablet 1    naproxen (NAPROSYN) 500 MG tablet Take 500 mg by mouth 3 (three) times daily.      polyethylene glycol (MOVIPREP) 100-7.5-2.691 gram solution Take as directed 1 kit 0    pravastatin (PRAVACHOL) 20 MG tablet Take 20 mg by mouth.      predniSONE (DELTASONE) 10 MG tablet Take 10 mg by mouth 3 (three) times daily.      promethazine (PHENERGAN) 6.25 mg/5 mL syrup Take 5 mLs by mouth 2 (two) times daily.      tirzepatide (MOUNJARO) 7.5 mg/0.5 mL PnIj INJECT 7.5 MG SUBCUTANEOUSLY EVERY 7 DAYS 4 pen 3    tirzepatide 10 mg/0.5 mL PnIj Inject 10 mg into the skin every 7 days. 4 pen 3    triamcinolone acetonide 0.1% (KENALOG) 0.1 % cream APPLY TO THE AFFECTED AREA(S) TWICE DAILY AS DIRECTED      atorvastatin (LIPITOR) 80 MG tablet Take 1  "tablet (80 mg total) by mouth once daily. 90 tablet 3    valACYclovir (VALTREX) 1000 MG tablet Take 1 tablet (1,000 mg total) by mouth every 8 (eight) hours. for 7 days 21 tablet 0     No current facility-administered medications for this visit.       Past Medical History:   Diagnosis Date    Diabetes mellitus type II     Fibroids     Hypertension     Mixed hyperlipidemia     Thyroid cancer 01/01/2011    thyroid       Past Surgical History:   Procedure Laterality Date    BILATERAL SALPINGOOPHORECTOMY  10/28/13    HYSTERECTOMY Bilateral 10/28/2013    supracervical with BSO secondary to uterine fibroids    OOPHORECTOMY      SALPINGOOPHORECTOMY Bilateral 10/2013    2/2 uterine fibroids    supracervical hysterectomy  10/28/13    THYROID SURGERY      TUBAL LIGATION         Family History   Problem Relation Age of Onset    Thyroid disease Mother     Heart attack Father     Diabetes type II Father     Breast cancer Neg Hx     Colon cancer Neg Hx     Ovarian cancer Neg Hx          Review of Systems:  ROS:  Constitutional: no fever or chills  Eyes: no visual changes  ENT: no nasal congestion or sore throat  Respiratory: no cough or shortness of breath  Cardiovascular: no chest pain or palpitations  Gastrointestinal: no nausea or vomiting, tolerating diet  Genitourinary: no hematuria or dysuria  Integument/Breast: no rash or pruritis  Hematologic/Lymphatic: no easy bruising or lymphadenopathy  Musculoskeletal:  Bilateral knee pain  Neurological: no seizures or tremors  Behavioral/Psych: no auditory or visual hallucinations  Endocrine: no heat or cold intolerance      OBJECTIVE:     PHYSICAL EXAM:  Vital Signs (Most Recent)  There were no vitals filed for this visit.  Height: 5' 10" (177.8 cm) Weight: 87.1 kg (192 lb 0.3 oz),   Estimated body mass index is 27.55 kg/m² as calculated from the following:    Height as of this encounter: 5' 10" (1.778 m).    Weight as of this encounter: 87.1 kg (192 lb 0.3 oz).   General " Appearance: Well nourished, well developed, in no acute distress.  HENT: Normal cephalic, oropharynx pink and moist  Eyes: PERRLA bilaterally and EOM x 4  Respiratory: Even and unlabored  Skin: Warm and Dry.   Psychiatric: AAO x 4, Mood & affect are normal.    bilateral  Knee Exam:  Knee Range of Motion:pain with terminal flexion and pain with terminal extension   Effusion:yes  Condition of skin:intact  Location of tenderness:Medial joint line and Lateral joint line   Strength:limited by pain and 4 of 5  Stability:  stable to testing, Lachman: stable, LCL: stable, MCL: stable, and PCL: stable  Varus /Valgus stress:  normal  Deena:   negative    Hip Examination:  normal    RADIOGRAPHS:  Bilateral knee x-rays were obtained, no acute fracture seen.  Patient has bilateral medial tibial femoral joint space narrowing consistent with osteoarthritis.  No significant joint effusion seen on x-ray per radiology report.  All radiographs were personally reviewed by me.    ASSESSMENT/PLAN:       ICD-10-CM ICD-9-CM   1. Pain and swelling of right knee  M25.561 719.46    M25.461 719.06   2. Pain and swelling of knee, left  M25.562 719.46    M25.462 719.06       -Rand Kaye presents to clinic today with c/c bilateral knee pain for the past 2 months, no trauma.  -X-ray as above.  -Recommend RICE therapy.    Patient with no history of gout presents to Orthopedics with chief complaint bilateral knee pain with swelling.  She reports she has a brother who has a diagnosis of gout.  She does have a history of diabetes and is on Mounjaro which has gotten her diabetes better control.  Denies any fever chills.  She is a  and reports has difficulty get an in and out of her truck to do her job due to the knee pain and swelling.    I will attempt to aspirate the knee and inject with steroids.  Please see procedural note.

## 2023-11-18 LAB
GRAM STN SPEC: NORMAL
GRAM STN SPEC: NORMAL

## 2023-11-20 LAB
BACTERIA SPEC AEROBE CULT: NO GROWTH
PATH INTERP FLD-IMP: NORMAL
PATH INTERP FLD-IMP: NORMAL

## 2023-11-21 LAB — BACTERIA SPEC AEROBE CULT: NO GROWTH

## 2023-11-22 ENCOUNTER — PATIENT MESSAGE (OUTPATIENT)
Dept: ORTHOPEDICS | Facility: CLINIC | Age: 63
End: 2023-11-22
Payer: COMMERCIAL

## 2023-11-26 LAB — BACTERIA SPEC ANAEROBE CULT: NORMAL

## 2023-11-27 ENCOUNTER — TELEPHONE (OUTPATIENT)
Dept: ORTHOPEDICS | Facility: CLINIC | Age: 63
End: 2023-11-27
Payer: COMMERCIAL

## 2023-11-27 LAB — BACTERIA SPEC ANAEROBE CULT: NORMAL

## 2023-11-27 NOTE — TELEPHONE ENCOUNTER
I spoke with patient regarding the results of her fluid analysis.  I explained to the patient I discussed the results with the surgeon.  It appears her fluid analysis is consistent with CPPD, suggestive of pseudogout.  I explained the treatment is usually NSAIDs or steroid injections.  Patient reports after receiving a steroid injection her knees felt better and she was able to dance on Thanksgiving.  The patient did mentioned that she had a mechanical fall.  She is not sure why she continues to fall.  I explained to her that this could be multifactorial and that she should be evaluated by her PCP who can do a thorough H and P and refer her to the appropriate provider or consider physical therapy.    I advised the patient it is recommended that she follow-up with Rheumatology regarding the pseudogout.  Patient is welcome to follow up with Orthopedics in 3 months for repeat knee injections if needed.  Patient verbalized understanding.

## 2023-12-19 ENCOUNTER — OFFICE VISIT (OUTPATIENT)
Dept: INTERNAL MEDICINE | Facility: CLINIC | Age: 63
End: 2023-12-19
Payer: COMMERCIAL

## 2023-12-19 ENCOUNTER — LAB VISIT (OUTPATIENT)
Dept: LAB | Facility: HOSPITAL | Age: 63
End: 2023-12-19
Attending: FAMILY MEDICINE
Payer: COMMERCIAL

## 2023-12-19 VITALS
HEIGHT: 70 IN | BODY MASS INDEX: 28.06 KG/M2 | DIASTOLIC BLOOD PRESSURE: 80 MMHG | OXYGEN SATURATION: 97 % | HEART RATE: 56 BPM | WEIGHT: 196 LBS | SYSTOLIC BLOOD PRESSURE: 126 MMHG

## 2023-12-19 DIAGNOSIS — Z12.11 ENCOUNTER FOR SCREENING FOR MALIGNANT NEOPLASM OF COLON: ICD-10-CM

## 2023-12-19 DIAGNOSIS — E89.0 POSTOPERATIVE HYPOTHYROIDISM: ICD-10-CM

## 2023-12-19 DIAGNOSIS — E55.9 VITAMIN D DEFICIENCY: ICD-10-CM

## 2023-12-19 DIAGNOSIS — E11.9 CONTROLLED TYPE 2 DIABETES MELLITUS WITHOUT COMPLICATION, WITHOUT LONG-TERM CURRENT USE OF INSULIN: ICD-10-CM

## 2023-12-19 DIAGNOSIS — I10 ESSENTIAL HYPERTENSION: ICD-10-CM

## 2023-12-19 DIAGNOSIS — Z00.00 ENCOUNTER FOR ANNUAL GENERAL MEDICAL EXAMINATION WITHOUT ABNORMAL FINDINGS IN ADULT: ICD-10-CM

## 2023-12-19 DIAGNOSIS — E78.2 MIXED HYPERLIPIDEMIA: ICD-10-CM

## 2023-12-19 DIAGNOSIS — Z85.850 HISTORY OF PAPILLARY ADENOCARCINOMA OF THYROID: ICD-10-CM

## 2023-12-19 DIAGNOSIS — Z76.89 ENCOUNTER TO ESTABLISH CARE WITH NEW DOCTOR: ICD-10-CM

## 2023-12-19 DIAGNOSIS — Z23 NEED FOR VACCINATION: ICD-10-CM

## 2023-12-19 DIAGNOSIS — Z00.00 ENCOUNTER FOR ANNUAL GENERAL MEDICAL EXAMINATION WITHOUT ABNORMAL FINDINGS IN ADULT: Primary | ICD-10-CM

## 2023-12-19 LAB
25(OH)D3+25(OH)D2 SERPL-MCNC: 96 NG/ML (ref 30–96)
ALBUMIN SERPL BCP-MCNC: 3.9 G/DL (ref 3.5–5.2)
ALP SERPL-CCNC: 61 U/L (ref 55–135)
ALT SERPL W/O P-5'-P-CCNC: 20 U/L (ref 10–44)
ANION GAP SERPL CALC-SCNC: 8 MMOL/L (ref 8–16)
AST SERPL-CCNC: 17 U/L (ref 10–40)
BILIRUB SERPL-MCNC: 0.3 MG/DL (ref 0.1–1)
BUN SERPL-MCNC: 13 MG/DL (ref 8–23)
CALCIUM SERPL-MCNC: 10.3 MG/DL (ref 8.7–10.5)
CHLORIDE SERPL-SCNC: 105 MMOL/L (ref 95–110)
CHOLEST SERPL-MCNC: 160 MG/DL (ref 120–199)
CHOLEST/HDLC SERPL: 2.4 {RATIO} (ref 2–5)
CO2 SERPL-SCNC: 26 MMOL/L (ref 23–29)
CREAT SERPL-MCNC: 0.8 MG/DL (ref 0.5–1.4)
ERYTHROCYTE [DISTWIDTH] IN BLOOD BY AUTOMATED COUNT: 13.8 % (ref 11.5–14.5)
EST. GFR  (NO RACE VARIABLE): >60 ML/MIN/1.73 M^2
ESTIMATED AVG GLUCOSE: 123 MG/DL (ref 68–131)
GLUCOSE SERPL-MCNC: 107 MG/DL (ref 70–110)
HBA1C MFR BLD: 5.9 % (ref 4–5.6)
HCT VFR BLD AUTO: 37.2 % (ref 37–48.5)
HDLC SERPL-MCNC: 66 MG/DL (ref 40–75)
HDLC SERPL: 41.3 % (ref 20–50)
HGB BLD-MCNC: 12.2 G/DL (ref 12–16)
LDLC SERPL CALC-MCNC: 80.4 MG/DL (ref 63–159)
MCH RBC QN AUTO: 29.1 PG (ref 27–31)
MCHC RBC AUTO-ENTMCNC: 32.8 G/DL (ref 32–36)
MCV RBC AUTO: 89 FL (ref 82–98)
NONHDLC SERPL-MCNC: 94 MG/DL
PLATELET # BLD AUTO: 323 K/UL (ref 150–450)
PMV BLD AUTO: 10.3 FL (ref 9.2–12.9)
POTASSIUM SERPL-SCNC: 3.6 MMOL/L (ref 3.5–5.1)
PROT SERPL-MCNC: 7.7 G/DL (ref 6–8.4)
RBC # BLD AUTO: 4.19 M/UL (ref 4–5.4)
SODIUM SERPL-SCNC: 139 MMOL/L (ref 136–145)
T4 FREE SERPL-MCNC: 1.16 NG/DL (ref 0.71–1.51)
TRIGL SERPL-MCNC: 68 MG/DL (ref 30–150)
TSH SERPL DL<=0.005 MIU/L-ACNC: 0.05 UIU/ML (ref 0.4–4)
WBC # BLD AUTO: 5.1 K/UL (ref 3.9–12.7)

## 2023-12-19 PROCEDURE — 83036 HEMOGLOBIN GLYCOSYLATED A1C: CPT | Performed by: FAMILY MEDICINE

## 2023-12-19 PROCEDURE — 99396 PREV VISIT EST AGE 40-64: CPT | Mod: S$GLB,,, | Performed by: FAMILY MEDICINE

## 2023-12-19 PROCEDURE — 80061 LIPID PANEL: CPT | Performed by: FAMILY MEDICINE

## 2023-12-19 PROCEDURE — 99213 OFFICE O/P EST LOW 20 MIN: CPT | Mod: 25,S$GLB,, | Performed by: FAMILY MEDICINE

## 2023-12-19 PROCEDURE — 84439 ASSAY OF FREE THYROXINE: CPT | Performed by: FAMILY MEDICINE

## 2023-12-19 PROCEDURE — 1159F MED LIST DOCD IN RCRD: CPT | Mod: CPTII,S$GLB,, | Performed by: FAMILY MEDICINE

## 2023-12-19 PROCEDURE — 80053 COMPREHEN METABOLIC PANEL: CPT | Performed by: FAMILY MEDICINE

## 2023-12-19 PROCEDURE — 1160F RVW MEDS BY RX/DR IN RCRD: CPT | Mod: CPTII,S$GLB,, | Performed by: FAMILY MEDICINE

## 2023-12-19 PROCEDURE — 3079F DIAST BP 80-89 MM HG: CPT | Mod: CPTII,S$GLB,, | Performed by: FAMILY MEDICINE

## 2023-12-19 PROCEDURE — 84443 ASSAY THYROID STIM HORMONE: CPT | Performed by: FAMILY MEDICINE

## 2023-12-19 PROCEDURE — 99999 PR PBB SHADOW E&M-EST. PATIENT-LVL IV: CPT | Mod: PBBFAC,,, | Performed by: FAMILY MEDICINE

## 2023-12-19 PROCEDURE — 85027 COMPLETE CBC AUTOMATED: CPT | Performed by: FAMILY MEDICINE

## 2023-12-19 PROCEDURE — 3008F BODY MASS INDEX DOCD: CPT | Mod: CPTII,S$GLB,, | Performed by: FAMILY MEDICINE

## 2023-12-19 PROCEDURE — 82306 VITAMIN D 25 HYDROXY: CPT | Performed by: FAMILY MEDICINE

## 2023-12-19 PROCEDURE — 36415 COLL VENOUS BLD VENIPUNCTURE: CPT | Performed by: FAMILY MEDICINE

## 2023-12-19 PROCEDURE — 3074F SYST BP LT 130 MM HG: CPT | Mod: CPTII,S$GLB,, | Performed by: FAMILY MEDICINE

## 2023-12-19 RX ORDER — OMEPRAZOLE 20 MG/1
20 CAPSULE, DELAYED RELEASE ORAL DAILY
COMMUNITY
Start: 2023-08-30 | End: 2023-12-19

## 2023-12-19 RX ORDER — LEVOTHYROXINE SODIUM 125 UG/1
125 TABLET ORAL EVERY MORNING
COMMUNITY
Start: 2023-11-21 | End: 2023-12-26

## 2023-12-19 NOTE — PROGRESS NOTES
Subjective:     Patient ID: Rand Kaye is a 63 y.o. female.   Chief Complaint: Annual Exam    HPI:  Patient presents to [facility] care.  Patient is due for annual exam and labs.    No acute concerns today.        Review of Problems & History:  Patient Active Problem List   Diagnosis    Controlled type 2 diabetes mellitus, without long-term current use of insulin    History of papillary adenocarcinoma of thyroid    Postoperative hypothyroidism    Obesity    Essential hypertension    Mixed hyperlipidemia    Status post laparoscopic supracervical hysterectomy      Past Medical History:   Diagnosis Date    Diabetes mellitus type II     Fibroids     Hypertension     Mixed hyperlipidemia     Thyroid cancer 2011    thyroid      Past Surgical History:   Procedure Laterality Date    BILATERAL SALPINGOOPHORECTOMY  10/28/13    HYSTERECTOMY Bilateral 10/28/2013    supracervical with BSO secondary to uterine fibroids    OOPHORECTOMY      SALPINGOOPHORECTOMY Bilateral 10/2013    2/2 uterine fibroids    supracervical hysterectomy  10/28/13    THYROID SURGERY      TUBAL LIGATION        Social History     Socioeconomic History    Marital status:    Tobacco Use    Smoking status: Former     Current packs/day: 0.00     Average packs/day: 1 pack/day for 3.0 years (3.0 ttl pk-yrs)     Types: Cigarettes     Start date: 1990     Quit date: 1993     Years since quittin.9    Smokeless tobacco: Never   Substance and Sexual Activity    Alcohol use: Yes     Alcohol/week: 0.0 standard drinks of alcohol     Comment: occasionally    Drug use: No    Sexual activity: Not Currently     Partners: Male     Birth control/protection: Surgical, Abstinence, See Surgical Hx      Health Maintenance:  Colon Cancer Screening  Due for screening  Lung Cancer Screening  Pack-Years: Pack Years: 3  Cervical Cancer Screening  Last was in , normal, no further testing needed  Breast Cancer Screening  Next due: due date:  2024  ASCVD Risk  The 10-year ASCVD risk score (Ambrosio ALFONSO, et al., 2019) is: 14.7%    Values used to calculate the score:      Age: 63 years      Sex: Female      Is Non- : Yes      Diabetic: Yes      Tobacco smoker: No      Systolic Blood Pressure: 126 mmHg      Is BP treated: Yes      HDL Cholesterol: 66 mg/dL      Total Cholesterol: 160 mg/dL   Lab Results   Component Value Date    CHOL 160 12/19/2023    HDL 66 12/19/2023    TRIG 68 12/19/2023     Statin rx: yes    Medication Review:    Current Outpatient Medications:     amLODIPine (NORVASC) 10 MG tablet, Take 10 mg by mouth once daily., Disp: , Rfl:     aspirin (ECOTRIN) 81 MG EC tablet, Take 81 mg by mouth once daily. Last dose preop was 10/23/13, Disp: , Rfl:     atorvastatin (LIPITOR) 80 MG tablet, Take 1 tablet (80 mg total) by mouth once daily., Disp: 90 tablet, Rfl: 3    benazepriL (LOTENSIN) 20 MG tablet, Take 20 mg by mouth, Disp: 30 tablet, Rfl: 3    betamethasone dipropionate 0.05 % cream, Apply topically 2 (two) times daily as needed., Disp: , Rfl:     cholecalciferol, vitamin D3, (VITAMIN D3) 25 mcg (1,000 unit) capsule, Take 1,000 Units by mouth once daily., Disp: , Rfl:     ezetimibe (ZETIA) 10 mg tablet, Take 1 tablet (10 mg total) by mouth once daily., Disp: 90 tablet, Rfl: 3    fish oil-omega-3 fatty acids 300-1,000 mg capsule, Take 2 g by mouth once daily., Disp: , Rfl:     glimepiride (AMARYL) 1 MG tablet, Take 1 mg by mouth every morning., Disp: , Rfl:     atenoloL-chlorthalidone (TENORETIC) 100-25 mg per tablet, TAKE 1 TABLET BY MOUTH EVERY DAY, Disp: 90 tablet, Rfl: 3    blood sugar diagnostic Strp, To check BG 4 times daily, to use with insurance preferred meter, Disp: 200 each, Rfl: 6    blood-glucose meter kit, To check BG 4 times daily, to use with insurance preferred meter, Disp: 1 each, Rfl: 1    diclofenac sodium (VOLTAREN) 1 % Gel, Apply 2 g topically 3 (three) times daily. Apply to the area of pain 2-3x per  day or night as needed, Disp: 100 g, Rfl: 3    FREESTYLE LANCETS 28 gauge lancets, Apply topically 4 (four) times daily., Disp: , Rfl:     lancets Misc, To check BG 4 times daily, to use with insurance preferred meter, Disp: 200 each, Rfl: 6    lancets Misc, To check BG 4 times daily, to use with insurance preferred meter, Disp: 200 each, Rfl: 5    levothyroxine (SYNTHROID) 125 MCG tablet, TAKE 1 TABLET BY MOUTH BEFORE BREAKFAST, Disp: 90 tablet, Rfl: 3    metFORMIN (GLUCOPHAGE-XR) 500 MG ER 24hr tablet, Take 2 tablets (1,000 mg total) by mouth 2 (two) times daily., Disp: 360 tablet, Rfl: 3    tirzepatide (MOUNJARO) 7.5 mg/0.5 mL PnIj, INJECT 7.5 MG SUBCUTANEOUSLY EVERY 7 DAYS, Disp: 4 pen , Rfl: 3    tirzepatide 10 mg/0.5 mL PnIj, Inject 10 mg into the skin every 7 days., Disp: 4 pen, Rfl: 3    triamcinolone acetonide 0.1% (KENALOG) 0.1 % cream, APPLY TO THE AFFECTED AREA(S) TWICE DAILY AS DIRECTED, Disp: , Rfl:      Review of Systems   Constitutional:  Negative for chills, fatigue and fever.   HENT:  Negative for nasal congestion, ear pain, postnasal drip and sore throat.    Eyes:  Negative for visual disturbance.   Respiratory:  Negative for cough, shortness of breath and wheezing.    Cardiovascular:  Negative for chest pain and palpitations.   Gastrointestinal:  Negative for abdominal pain, change in bowel habit, constipation, diarrhea, nausea and vomiting.   Genitourinary:  Negative for dysuria and hematuria.   Musculoskeletal:  Negative for back pain, leg pain and neck pain.   Neurological:  Negative for dizziness, numbness and headaches.   Hematological:  Negative for adenopathy.   Psychiatric/Behavioral:  Negative for dysphoric mood, sleep disturbance and suicidal ideas. The patient is not nervous/anxious.           Objective:      Vitals:    12/19/23 0820   BP: 126/80   BP Location: Right arm   Patient Position: Sitting   BP Method: Medium (Manual)   Pulse: (!) 56   SpO2: 97%   Weight: 88.9 kg (195 lb 15.8  "oz)   Height: 5' 10" (1.778 m)      Physical Exam  Vitals and nursing note reviewed.   Constitutional:       General: She is not in acute distress.     Appearance: Normal appearance. She is not ill-appearing.   HENT:      Head: Normocephalic and atraumatic.      Right Ear: Tympanic membrane, ear canal and external ear normal. There is no impacted cerumen.      Left Ear: Tympanic membrane, ear canal and external ear normal. There is no impacted cerumen.      Nose: Nose normal. No congestion or rhinorrhea.      Mouth/Throat:      Mouth: Mucous membranes are moist.      Pharynx: Oropharynx is clear. No oropharyngeal exudate or posterior oropharyngeal erythema.   Eyes:      General: No scleral icterus.        Right eye: No discharge.         Left eye: No discharge.      Conjunctiva/sclera: Conjunctivae normal.   Neck:      Thyroid: No thyroid mass, thyromegaly or thyroid tenderness.   Cardiovascular:      Rate and Rhythm: Regular rhythm. Bradycardia present.      Pulses: Normal pulses.      Heart sounds: Normal heart sounds. No murmur heard.     No friction rub. No gallop.   Pulmonary:      Effort: Pulmonary effort is normal. No respiratory distress.      Breath sounds: Normal breath sounds. No wheezing, rhonchi or rales.   Abdominal:      General: Abdomen is flat. Bowel sounds are normal. There is no distension.      Palpations: Abdomen is soft. There is no mass.      Tenderness: There is no abdominal tenderness. There is no guarding.   Musculoskeletal:         General: No swelling or deformity. Normal range of motion.      Cervical back: Normal range of motion and neck supple. No tenderness.   Lymphadenopathy:      Cervical: No cervical adenopathy.   Skin:     General: Skin is warm and dry.   Neurological:      General: No focal deficit present.      Mental Status: She is alert and oriented to person, place, and time. Mental status is at baseline.      Gait: Gait normal.      Deep Tendon Reflexes: Reflexes normal. "   Psychiatric:         Mood and Affect: Mood normal.         Behavior: Behavior normal.         Thought Content: Thought content normal.         Judgment: Judgment normal.           Assessment:       Problem List Items Addressed This Visit          Cardiac/Vascular    Essential hypertension    Relevant Orders    Comprehensive Metabolic Panel (Completed)    CBC Without Differential (Completed)    Lipid Panel (Completed)    Mixed hyperlipidemia       Oncology    History of papillary adenocarcinoma of thyroid    Relevant Orders    TSH (Completed)    T4, Free (Completed)       Endocrine    Controlled type 2 diabetes mellitus, without long-term current use of insulin    Relevant Orders    Comprehensive Metabolic Panel (Completed)    Lipid Panel (Completed)    Hemoglobin A1C (Completed)    Ambulatory referral/consult to Optometry    Postoperative hypothyroidism    Relevant Orders    TSH (Completed)    T4, Free (Completed)     Other Visit Diagnoses       Encounter for annual general medical examination without abnormal findings in adult    -  Primary    Relevant Orders    Comprehensive Metabolic Panel (Completed)    CBC Without Differential (Completed)    Lipid Panel (Completed)    Hemoglobin A1C (Completed)    TSH (Completed)    T4, Free (Completed)    Vitamin D (Completed)    Encounter to establish care with new doctor        Encounter for screening for malignant neoplasm of colon        Relevant Orders    Ambulatory referral/consult to Endo Procedure     Vitamin D deficiency        Relevant Orders    Vitamin D (Completed)    Need for vaccination                  Plan:       1. Encounter for annual general medical examination without abnormal findings in adult  Health maintenance updated  Chronic issues reviewed  Fasting labs ordered    -     Comprehensive Metabolic Panel; Future; Expected date: 12/19/2023  -     CBC Without Differential; Future; Expected date: 12/19/2023  -     Lipid Panel; Future; Expected  date: 12/19/2023  -     Hemoglobin A1C; Future; Expected date: 12/19/2023  -     TSH; Future; Expected date: 12/19/2023  -     T4, Free; Future; Expected date: 12/19/2023  -     Vitamin D; Future; Expected date: 12/19/2023    2. Encounter to establish care with new doctor  Reviewed chronic medical conditions, updated problem list and medication list      3. Encounter for screening for malignant neoplasm of colon  Ref for colonoscopy    -     Ambulatory referral/consult to Endo Procedure ; Future; Expected date: 12/20/2023    4. Vitamin D deficiency  Checking levels, will treat if indicated    -     Vitamin D; Future; Expected date: 12/19/2023    5. Need for vaccination  Tetanus booster was declined.  COVID booster was declined  Flu shot administered in clinic    6. Essential hypertension  Stable, checking metabolic labs  Continue current tx    -     Comprehensive Metabolic Panel; Future; Expected date: 12/19/2023  -     CBC Without Differential; Future; Expected date: 12/19/2023  -     Lipid Panel; Future; Expected date: 12/19/2023    7. History of papillary adenocarcinoma of thyroid  Checking levels    -     TSH; Future; Expected date: 12/19/2023  -     T4, Free; Future; Expected date: 12/19/2023    8. Mixed hyperlipidemia  Continue statin, f/u labs      9. Controlled type 2 diabetes mellitus without complication, without long-term current use of insulin  Checking labs  Ref to optometry  No change to therapy today    -     Comprehensive Metabolic Panel; Future; Expected date: 12/19/2023  -     Lipid Panel; Future; Expected date: 12/19/2023  -     Hemoglobin A1C; Future; Expected date: 12/19/2023  -     Ambulatory referral/consult to Optometry; Future; Expected date: 12/26/2023    10. Postoperative hypothyroidism  Continue current therapy, check labs and adjust dose if indicated    -     TSH; Future; Expected date: 12/19/2023  -     T4, Free; Future; Expected date: 12/19/2023             Follow up in about  6 months (around 6/19/2024).     Aquilino Bennett MD, FAAFP  Family Medicine Physician  [facility]  12/19/2023

## 2023-12-20 LAB
FUNGUS SPEC CULT: NORMAL
FUNGUS SPEC CULT: NORMAL

## 2023-12-22 ENCOUNTER — CLINICAL SUPPORT (OUTPATIENT)
Dept: ENDOSCOPY | Facility: HOSPITAL | Age: 63
End: 2023-12-22
Attending: FAMILY MEDICINE
Payer: COMMERCIAL

## 2023-12-22 ENCOUNTER — TELEPHONE (OUTPATIENT)
Dept: ENDOSCOPY | Facility: HOSPITAL | Age: 63
End: 2023-12-22

## 2023-12-22 VITALS — BODY MASS INDEX: 27.92 KG/M2 | HEIGHT: 70 IN | WEIGHT: 195 LBS

## 2023-12-22 DIAGNOSIS — Z12.11 ENCOUNTER FOR SCREENING FOR MALIGNANT NEOPLASM OF COLON: ICD-10-CM

## 2023-12-22 RX ORDER — SODIUM, POTASSIUM,MAG SULFATES 17.5-3.13G
1 SOLUTION, RECONSTITUTED, ORAL ORAL DAILY
Qty: 1 KIT | Refills: 0 | Status: SHIPPED | OUTPATIENT
Start: 2023-12-22 | End: 2023-12-24

## 2023-12-22 NOTE — TELEPHONE ENCOUNTER
Spoke to Pt to schedule procedure(s) Colonoscopy       Physician to perform procedure(s) Dr. WILLIAN Masters  Date of Procedure (s) 4/19/24  Arrival Time 9:15 AM  Time of Procedure(s) 10:15 AM   Location of Procedure(s) [place] [address] Floor  Type of Rx Prep sent to patient: Suprep  Instructions provided to patient via Email and mailed    Patient was informed on the following information and verbalized understanding. Screening questionnaire reviewed with patient and complete. If procedure requires anesthesia, a responsible adult needs to be present to accompany the patient home, patient cannot drive after receiving anesthesia. Appointment details are tentative, especially check-in time. Patient will receive a prep-op call 7 days prior to confirm check-in time for procedure. If applicable the patient should contact their pharmacy to verify Rx for procedure prep is ready for pick-up. Patient was advised to call the scheduling department at [phone] if pharmacy states no Rx is available. Patient was advised to call the endoscopy scheduling department if any questions or concerns arise.      [facility] Scheduling Department

## 2023-12-22 NOTE — PLAN OF CARE
Spoke to Pt to schedule procedure(s) Colonoscopy       Physician to perform procedure(s) Dr. WILLIAN Masters  Date of Procedure (s) 4/19/24  Arrival Time 9:15 AM  Time of Procedure(s) 10:15 AM   Location of Procedure(s) [place] 4th Floor  Type of Rx Prep sent to patient: Suprep  Instructions provided to patient via Email and mailed    Patient was informed on the following information and verbalized understanding. Screening questionnaire reviewed with patient and complete. If procedure requires anesthesia, a responsible adult needs to be present to accompany the patient home, patient cannot drive after receiving anesthesia. Appointment details are tentative, especially check-in time. Patient will receive a prep-op call 7 days prior to confirm check-in time for procedure. If applicable the patient should contact their pharmacy to verify Rx for procedure prep is ready for pick-up. Patient was advised to call the scheduling department at [phone] if pharmacy states no Rx is available. Patient was advised to call the endoscopy scheduling department if any questions or concerns arise.      [facility] Scheduling Department

## 2023-12-26 DIAGNOSIS — I10 ESSENTIAL HYPERTENSION: ICD-10-CM

## 2023-12-26 DIAGNOSIS — E11.65 UNCONTROLLED TYPE 2 DIABETES MELLITUS WITH HYPERGLYCEMIA, WITHOUT LONG-TERM CURRENT USE OF INSULIN: Chronic | ICD-10-CM

## 2023-12-26 RX ORDER — ATENOLOL AND CHLORTHALIDONE TABLET 100; 25 MG/1; MG/1
1 TABLET ORAL DAILY
Qty: 30 TABLET | Refills: 0 | Status: SHIPPED | OUTPATIENT
Start: 2023-12-26 | End: 2023-12-27

## 2023-12-26 RX ORDER — LEVOTHYROXINE SODIUM 125 UG/1
125 TABLET ORAL
Qty: 90 TABLET | Refills: 3 | Status: SHIPPED | OUTPATIENT
Start: 2023-12-26 | End: 2024-01-03

## 2023-12-26 NOTE — TELEPHONE ENCOUNTER
----- Message from Lory Gisselle sent at 12/23/2023  9:41 AM CST -----  Type:  RX Refill Request    Who Called: pt  Refill or New Rx:refill  RX Name and Strength:metFORMIN (GLUCOPHAGE-XR) 500 MG ER 24hr tablet; atenolol-chlorthalidone (TENORETIC) 100-25 mg per tablet and levothyroxine (SYNTHROID) 125 MCG tablet  Preferred Pharmacy with phone number:[id number] [place] [id number]  [address] AT [facility]  Local or Mail Order:local  Ordering Provider:marv  Would the patient rather a call back or a response via [facility]? call  Best Call Back Number: [phone]  Additional Information:

## 2023-12-27 DIAGNOSIS — I10 ESSENTIAL HYPERTENSION: ICD-10-CM

## 2023-12-27 RX ORDER — ATENOLOL AND CHLORTHALIDONE TABLET 100; 25 MG/1; MG/1
1 TABLET ORAL
Qty: 90 TABLET | Refills: 3 | Status: SHIPPED | OUTPATIENT
Start: 2023-12-27

## 2023-12-27 RX ORDER — METFORMIN HYDROCHLORIDE 500 MG/1
1000 TABLET, EXTENDED RELEASE ORAL 2 TIMES DAILY
Qty: 360 TABLET | Refills: 3 | Status: SHIPPED | OUTPATIENT
Start: 2023-12-27 | End: 2024-12-21

## 2024-01-02 NOTE — PROGRESS NOTES
Subjective:      Chief Complaint: post surgical hypothyroidism and diabetes mellitus type 2    HPI: Rand Kaye is a 63 y.o. female who is here for a follow up evaluation of papillary thyroid cancer and diabetes mellitus type 2. Frequently lost to follow up.  Previous patient of Dr. Whitten. Last visit in Aug 2023    Since her last visit she had bilateral knee injections in Nov 2023, states pain is better. At her last visit we decreased glimepiride to half tab daily but she is still taking whole tab.    Regarding her history of thyroid cancer:  -She had thyroidectomy in 2012 for multi-nodular goiter with compressive symptoms. Incidentally found to have a 1 cm papillary thyroid cancer in left lobe after surgery.   She received 30 mCi iodine after surgery, but did not show for WBS.      Latest Reference Range & Units 01/28/23 09:16 03/09/23 15:04   Thyroglobulin Interpretation  SEE BELOW SEE BELOW   Thyroglobulin Antibody Screen <1.8 IU/mL <1.8 <1.8   Thyroglobulin, Tumor Marker ng/mL 14 (H) 2.5 (H)   (H): Data is abnormally high    Lab Results   Component Value Date    TSH 0.049 (L) 12/19/2023    FREET4 1.16 12/19/2023     Ultrasound Neck 4/2023  FINDINGS:  THYROID GLAND has been surgically removed.   Sonographic examination of neck compartments II through V was carried out.  No pathologic lymphadenopathy identified.   There is a 0.4 x 0.6 x 0.4 cm hypoechoic focus in the right inferior thyroid bed.  There is no vascularity present, borders are well defined.  No microcalcification's are seen. This focus measured 0.5 x 0.5 x 0.4 cm on previous ultrasound.   There is a 1.5 x 1.0 x 0.93 cm hypoechoic focus in the left middle thyroid bed, likely residual thyroid tissue.  Vascularity is peripheral, borders are well defined.  No microcalcification's are seen.  This focus measured 1.5 x 0.8 x 0.4 cm on previous ultrasound.   Impression:   Status post thyroidectomy.   No sonographic evidence of malignancy.   Recommend to  continue correlation with thyroglobulin levels and repeat neck ultrasound in 1 year to follow the 0.4 cm hypoechoic focus in the right inferior thyroid bed and the 1.5 cm hypoechoic focus in the left middle thyroid bed.    CT neck   9/2018  1.2 cm enhancing structure in the left thyroid resection bed.  Findings are nonspecific and may represent residual thyroid tissue or vascular structure, however in this patient with a history of papillary thyroid carcinoma continued surveillance is recommended.     Pathology      Now regarding Post-Surgical Hypothyroidism  -Patient presents for evaluation of thyroid function. She had thyroidectomy in 2012 for multi-nodular goiter with compressive symptoms. Incidentally found to have a 1 cm papillary thyroid cancer in left lobe after surgery. She received 30 mCi iodine after surgery, but did not show for WBS.     -Current medication is levothyroxine 125 mcg daily   Not missing doses     -Current symptoms:     Jan 2023)    No   Yes    [x]    []   Weight gain    [x]    []   Fatigue    [x]    []   Constipation    [x]    []   Hair loss    [x]    []   Brittle nails    [x]    []   Mental fog    [x]    []   Cold intolerance    [x]    []   Memory impair    [x]    []   Muscle weakness    [x]    []   Neck swelling    [x]    []   Hoarseness    [x]    []   Periorbital edema    [x]    []   Lithium or Amiodarone use    [x]    []   Recent severe illness      Denies palpitations or tremors     Lab Results   Component Value Date    TSH 0.049 (L) 12/19/2023    TSH 0.072 (L) 08/09/2023    TSH 0.109 (L) 06/12/2023    FREET4 1.16 12/19/2023    FREET4 1.02 08/09/2023    FREET4 1.20 06/12/2023    THYROPEROXID < 6.0 01/23/2012     Now Regarding diabetes:  -Patient was initially diagnosed with Type 2 diabetes mellitus in 2009  Denies DKA  -Diabetic complications present  She needs eye exam; has appt   Neuropathy :tolerable  Nephropathy :+  CAD: denies    Diabetes History in Family  Father had Type 2  Diabetes Mellitus    -Denies history of pancreatitis, medullary thyroid cancer, recurrent UTI or recurrent fungal infection     Current diabetic medications include:   Metformin  mg twice daily (tries to increase and has severe diarrhea)  Mounjaro 7.5 mg weekly (no intolerable side effects) or Mounjaro 10 mg weekly if they have in pharmacy -depends on what dose she can get  Glimepiride 1 mg daily -did not cut in half but only taking a couple days a week because she forgets    Denies missing doses of above medications    Other medications tried  Ozempic prescribed but did not start  Does not remember being on insulin  States she is  and cannot start insulin    Blood Sugar Range   3-4 times a week  Had ron but not covered  No blood sugars >180    Hypoglycemic Episodes:  Denies and denies s/s of hypoglycemia    Diet  Meals are: She has changed her diet since her first visit with us. Has felt satiety effects from Mounjaro  Breakfast: cornflakes or oatmeal; tuna with crackers   Lunch: beans and rice or spaghetti   Dinner: skipped or same as lunch  Eating 3 meals per day  Snacks: protein packs   Drinking diet coke, powerade, water and lemon tea     Does patient count carbohydrates? no    Exercise: She bought a treadmill in the evenings 3 days a week for 30 minutes but not using yet  Walks, [place]    Diabetes education on 2/23/2023    Screening for Complications:    Dyslipidemia   Statin: Taking Atorvastatin 20 mg daily, LDL above goal at 145  Nephropathy  ACE/ARB: Taking Benazepril 20 mg daily    Lab Results   Component Value Date    HGBA1C 5.9 (H) 12/19/2023    HGBA1C 6.3 (H) 04/21/2023    HGBA1C 10.4 (H) 01/28/2023     Diabetes Management Status  Screening or Prevention Patient's value Goal Complete/Controlled?   HgA1C Testing and Control   Lab Results   Component Value Date    HGBA1C 5.9 (H) 12/19/2023      Annually/Less than 8% No   Lipid profile : 12/19/2023 Annually Yes   LDL control Lab  Results   Component Value Date    LDLCALC 80.4 12/19/2023    Annually/Less than 100 mg/dl  No   Nephropathy screening Lab Results   Component Value Date    LABMICR 680.0 01/28/2023     Lab Results   Component Value Date    PROTEINUA Negative 04/19/2012    Annually Yes   Blood pressure BP Readings from Last 1 Encounters:   01/03/24 136/86    Less than 140/90 No   Dilated retinal exam : 09/24/2018 Annually No   Foot exam   : 01/26/2023 Annually No     With regards to the hypercalcemia,    Lab Results   Component Value Date    PTH 53 04/19/2012    CALCIUM 10.3 12/19/2023    CAION 1.11 03/27/2012    PHOS 3.1 01/18/2019     Lab Results   Component Value Date    ALBUMIN 3.9 12/19/2023      Latest Reference Range & Units 01/23/12 11:28 09/10/16 09:59 01/28/23 09:16   Vit D, 25-Hydroxy 30 - 96 ng/mL 18 (L) 43 52   (L): Data is abnormally low        Daily intake of calcium is: none  Taking vitamin D: OTC 1000 IU daily     Latest Reference Range & Units 12/19/23 09:20   Vit D, 25-Hydroxy 30 - 96 ng/mL 96       Denies constipation, depression, polyuria,   + muscle aches or pains in knees.    Last bmd was: denies   Denies fractures, height loss or kidney stones  Denies history of renal disease.   There is no family history of hyperparathyroidism or calcium problems as far as she knows.  Denies HCTZ., lithium,   + chlorthiadone use.    5 indications for surgery:  CKD  <50 yoa  + kidney stones  Serum Ca > 11.5  Urine Ca >400   Osteoporosis    With regards to dyslipidemia,     She is on atorvastatin 80 mg daily and zetia 10 mg daily   Latest Reference Range & Units 08/03/18 16:20 02/18/22 14:41 01/28/23 09:16 12/19/23 09:20   Cholesterol Total 120 - 199 mg/dL 222 (H) 250 (H) 180 160   HDL 40 - 75 mg/dL 66 73 70 66   HDL/Cholesterol Ratio 20.0 - 50.0 % 29.7 29.2 38.9 41.3   Non-HDL Cholesterol mg/dL 156 177 110 94   Total Cholesterol/HDL Ratio 2.0 - 5.0  3.4 3.4 2.6 2.4   Triglycerides 30 - 150 mg/dL 191 (H) 157 (H) 102 68   LDL  Cholesterol 63.0 - 159.0 mg/dL 117.8 145.6 89.6 80.4   (H): Data is abnormally high      Review of Systems   Constitutional:  Negative for fatigue and unexpected weight change.   Eyes:  Negative for visual disturbance.   Endocrine: Negative for polydipsia, polyphagia and polyuria.      as above    Objective:     Vitals:    01/03/24 1347   BP: 136/86   Pulse: (!) 54     BP Readings from Last 5 Encounters:   01/03/24 136/86   12/19/23 126/80   11/09/23 (!) 142/77   11/06/23 137/65   09/07/23 (!) 188/86     Wt Readings from Last 3 Encounters:   01/03/24 1347 89.8 kg (198 lb 1.3 oz)   12/22/23 0817 88.5 kg (195 lb)   12/19/23 0820 88.9 kg (195 lb 15.8 oz)     Physical Exam  Constitutional:       Appearance: She is obese.   Neck:      Comments: No thyroid tissue  Pulmonary:      Effort: Pulmonary effort is normal.   Neurological:      Mental Status: She is alert.     Diabetes Foot Exam:   No sores or lesions to bilateral feet  Shoes appropriate  DM foot exam done in Jan 2023    Wt Readings from Last 10 Encounters:   01/03/24 1347 89.8 kg (198 lb 1.3 oz)   12/22/23 0817 88.5 kg (195 lb)   12/19/23 0820 88.9 kg (195 lb 15.8 oz)   11/17/23 0959 87.1 kg (192 lb 0.3 oz)   11/09/23 1149 87.1 kg (192 lb 0.3 oz)   11/09/23 1008 88.5 kg (195 lb)   11/06/23 1243 88.5 kg (195 lb)   09/07/23 1105 92.5 kg (204 lb)   09/05/23 1157 92.8 kg (204 lb 9.6 oz)   08/31/23 0845 91.8 kg (202 lb 7.9 oz)   08/17/23 1303 91 kg (200 lb 11.7 oz)     Lab Results   Component Value Date    HGBA1C 5.9 (H) 12/19/2023     Lab Results   Component Value Date    CHOL 160 12/19/2023    HDL 66 12/19/2023    LDLCALC 80.4 12/19/2023    TRIG 68 12/19/2023    CHOLHDL 41.3 12/19/2023     Lab Results   Component Value Date     12/19/2023    K 3.6 12/19/2023     12/19/2023    CO2 26 12/19/2023     12/19/2023    BUN 13 12/19/2023    CREATININE 0.8 12/19/2023    CALCIUM 10.3 12/19/2023    PROT 7.7 12/19/2023    ALBUMIN 3.9 12/19/2023    BILITOT 0.3  12/19/2023    ALKPHOS 61 12/19/2023    AST 17 12/19/2023    ALT 20 12/19/2023    ANIONGAP 8 12/19/2023    ESTGFRAFRICA >60.0 02/18/2022    EGFRNONAA >60.0 02/18/2022    TSH 0.049 (L) 12/19/2023      Lab Results   Component Value Date    MICALBCREAT 309.1 (H) 01/28/2023     Assessment/Plan:     1. Controlled type 2 diabetes mellitus without complication, without long-term current use of insulin        2. Postoperative hypothyroidism  TSH    Thyroglobulin    US Soft Tissue Head Neck Thyroid    levothyroxine (SYNTHROID) 112 MCG tablet      3. History of papillary adenocarcinoma of thyroid  TSH    Thyroglobulin    US Soft Tissue Head Neck Thyroid    levothyroxine (SYNTHROID) 112 MCG tablet      4. Mixed hyperlipidemia  evolocumab (REPATHA PUSHTRONEX) 420 mg/3.5 mL Injt      5. Essential hypertension        6. Hypercalcemia          Controlled type 2 diabetes mellitus, without long-term current use of insulin  -- Reviewed goals of therapy are to get the best control we can without hypoglycemia  A1c at goal   I suspect she is having hypoglycemia      Medication Changes   Continue Metformin  mg twice daily  Increase Mounjaro 10 mg weekly when you find supply or stay on Mounjaro 7.5 mg weekly  Stop glimepiride    -- Advised frequent self blood glucose monitoring.  Patient encouraged to document glucose results and bring them to every clinic visit    -- Hypoglycemia precautions discussed. Instructed on precautions before driving.    -- Call for Bg repeatedly < 90 or > 180.   -- Close adherence to lifestyle changes recommended.   -- Periodic follow ups for eye evaluations, foot care and dental care suggested.    Postoperative hypothyroidism  - Will repeat TSH and target a goal of 0.5-2    Check thyroid US in April 2024      Check thyroid US in April 2024     She is taking Levothyroxine 125 mcg daily  TSH suppressed- too much medication   Denies hyperthyroid symptoms.  Decrease Levothyroxine to 112 mcg daily    Check  labs in 8 weeks         History of papillary adenocarcinoma of thyroid  -Stage 1 papillary thyroid cancer s/p thyroidectomy and radioiodine in 2012   -History of elevated thyroglobulin and ultrasound with possible residual thyroid tissue but no lymphadenopathy   TG trending downward and stable. Repeat TG   US in 2023 stable 0.4 cm in right bed and 1.5 cm in left bed  Repeat US in one year in April 2024      Mixed hyperlipidemia  LDL above goal of less than 70  Continue atorvastatin 80 mg daily and zetia 10 mg daily  Add Repatha 420 mg monthly patch      Essential hypertension  On ACEI    Hypercalcemia              On chlorthalidone               Stay hydrated              Watch calcium level    STOP VITAMIN D    Follow up in about 4 months (around 5/3/2024).  Labs in 8 weeks

## 2024-01-02 NOTE — ASSESSMENT & PLAN NOTE
-Stage 1 papillary thyroid cancer s/p thyroidectomy and radioiodine in 2012   -History of elevated thyroglobulin and ultrasound with possible residual thyroid tissue but no lymphadenopathy   TG trending downward and stable. Repeat TG   US in 2023 stable 0.4 cm in right bed and 1.5 cm in left bed  Repeat US in one year in April 2024

## 2024-01-02 NOTE — ASSESSMENT & PLAN NOTE
-- Reviewed goals of therapy are to get the best control we can without hypoglycemia  A1c at goal   I suspect she is having hypoglycemia      Medication Changes   Continue Metformin  mg twice daily  Increase Mounjaro 10 mg weekly when you find supply or stay on Mounjaro 7.5 mg weekly  Stop glimepiride    -- Advised frequent self blood glucose monitoring.  Patient encouraged to document glucose results and bring them to every clinic visit    -- Hypoglycemia precautions discussed. Instructed on precautions before driving.    -- Call for Bg repeatedly < 90 or > 180.   -- Close adherence to lifestyle changes recommended.   -- Periodic follow ups for eye evaluations, foot care and dental care suggested.

## 2024-01-02 NOTE — ASSESSMENT & PLAN NOTE
LDL above goal of less than 70  Continue atorvastatin 80 mg daily and zetia 10 mg daily  Add Repatha 420 mg monthly patch

## 2024-01-02 NOTE — ASSESSMENT & PLAN NOTE
- Will repeat TSH and target a goal of 0.5-2    Check thyroid US in April 2024      Check thyroid US in April 2024     She is taking Levothyroxine 125 mcg daily  TSH suppressed- too much medication   Denies hyperthyroid symptoms.  Decrease Levothyroxine to 112 mcg daily    Check labs in 8 weeks

## 2024-01-03 ENCOUNTER — OFFICE VISIT (OUTPATIENT)
Dept: ENDOCRINOLOGY | Facility: CLINIC | Age: 64
End: 2024-01-03
Payer: COMMERCIAL

## 2024-01-03 VITALS
OXYGEN SATURATION: 98 % | HEART RATE: 54 BPM | BODY MASS INDEX: 27.73 KG/M2 | DIASTOLIC BLOOD PRESSURE: 86 MMHG | SYSTOLIC BLOOD PRESSURE: 136 MMHG | HEIGHT: 71 IN | WEIGHT: 198.06 LBS

## 2024-01-03 DIAGNOSIS — E89.0 POSTOPERATIVE HYPOTHYROIDISM: ICD-10-CM

## 2024-01-03 DIAGNOSIS — E78.2 MIXED HYPERLIPIDEMIA: ICD-10-CM

## 2024-01-03 DIAGNOSIS — I10 ESSENTIAL HYPERTENSION: ICD-10-CM

## 2024-01-03 DIAGNOSIS — E83.52 HYPERCALCEMIA: ICD-10-CM

## 2024-01-03 DIAGNOSIS — E11.9 CONTROLLED TYPE 2 DIABETES MELLITUS WITHOUT COMPLICATION, WITHOUT LONG-TERM CURRENT USE OF INSULIN: Primary | ICD-10-CM

## 2024-01-03 DIAGNOSIS — Z85.850 HISTORY OF PAPILLARY ADENOCARCINOMA OF THYROID: ICD-10-CM

## 2024-01-03 PROCEDURE — 99214 OFFICE O/P EST MOD 30 MIN: CPT | Mod: S$GLB,,, | Performed by: NURSE PRACTITIONER

## 2024-01-03 PROCEDURE — 1159F MED LIST DOCD IN RCRD: CPT | Mod: CPTII,S$GLB,, | Performed by: NURSE PRACTITIONER

## 2024-01-03 PROCEDURE — 1160F RVW MEDS BY RX/DR IN RCRD: CPT | Mod: CPTII,S$GLB,, | Performed by: NURSE PRACTITIONER

## 2024-01-03 PROCEDURE — 99999 PR PBB SHADOW E&M-EST. PATIENT-LVL V: CPT | Mod: PBBFAC,,, | Performed by: NURSE PRACTITIONER

## 2024-01-03 PROCEDURE — 3079F DIAST BP 80-89 MM HG: CPT | Mod: CPTII,S$GLB,, | Performed by: NURSE PRACTITIONER

## 2024-01-03 PROCEDURE — 3008F BODY MASS INDEX DOCD: CPT | Mod: CPTII,S$GLB,, | Performed by: NURSE PRACTITIONER

## 2024-01-03 PROCEDURE — 3075F SYST BP GE 130 - 139MM HG: CPT | Mod: CPTII,S$GLB,, | Performed by: NURSE PRACTITIONER

## 2024-01-03 RX ORDER — LEVOTHYROXINE SODIUM 112 UG/1
112 TABLET ORAL
Qty: 30 TABLET | Refills: 11 | Status: SHIPPED | OUTPATIENT
Start: 2024-01-03 | End: 2025-01-02

## 2024-01-03 RX ORDER — EVOLOCUMAB 420 MG/3.5
420 KIT SUBCUTANEOUS
Qty: 3.5 ML | Refills: 0 | Status: ACTIVE | OUTPATIENT
Start: 2024-01-03 | End: 2024-01-31

## 2024-01-03 NOTE — ASSESSMENT & PLAN NOTE
On chlorthalidone               Stay hydrated              Watch calcium level    STOP VITAMIN D

## 2024-01-03 NOTE — PATIENT INSTRUCTIONS
Thyroid Cancer  Check thyroid US in April 2024     She is taking Levothyroxine 125 mcg daily  TSH suppressed- too much medication   Denies hyperthyroid symptoms.  Decrease Levothyroxine to 112 mcg daily    Check labs in 8 weeks     Diabetes  A1c at goal   I suspect she is having hypoglycemia      Medication Changes   Continue Metformin  mg twice daily  Increase Mounjaro 10 mg weekly when you find supply or stay on Mounjaro 7.5 mg weekly  Stop glimepiride      Discussed we may stop glimepiride prior to next visit     Borderline High Calcium              On chlorthalidone               Stay hydrated              Watch calcium level    STOP VITAMIN D    Cholesterol    LDL above goal of less than 70   Continue atorvastatin 80 mg daily and zetia 10 mg daily   Add Repatha 420 mg monthly patch [phone]

## 2024-01-05 LAB
ACID FAST MOD KINY STN SPEC: NORMAL
ACID FAST MOD KINY STN SPEC: NORMAL
MYCOBACTERIUM SPEC QL CULT: NORMAL
MYCOBACTERIUM SPEC QL CULT: NORMAL

## 2024-01-08 ENCOUNTER — PATIENT MESSAGE (OUTPATIENT)
Dept: ORTHOPEDICS | Facility: CLINIC | Age: 64
End: 2024-01-08
Payer: COMMERCIAL

## 2024-01-18 DIAGNOSIS — E11.65 UNCONTROLLED TYPE 2 DIABETES MELLITUS WITH HYPERGLYCEMIA, WITHOUT LONG-TERM CURRENT USE OF INSULIN: ICD-10-CM

## 2024-01-18 RX ORDER — TIRZEPATIDE 7.5 MG/.5ML
INJECTION, SOLUTION SUBCUTANEOUS
Qty: 4 PEN | Refills: 3 | Status: SHIPPED | OUTPATIENT
Start: 2024-01-18

## 2024-02-14 ENCOUNTER — TELEPHONE (OUTPATIENT)
Dept: ENDOCRINOLOGY | Facility: CLINIC | Age: 64
End: 2024-02-14
Payer: COMMERCIAL

## 2024-03-14 DIAGNOSIS — E78.2 MIXED HYPERLIPIDEMIA: Primary | ICD-10-CM

## 2024-03-18 DIAGNOSIS — E78.2 MIXED HYPERLIPIDEMIA: Primary | ICD-10-CM

## 2024-04-03 DIAGNOSIS — E11.9 TYPE 2 DIABETES MELLITUS WITHOUT COMPLICATION: ICD-10-CM

## 2024-04-03 DIAGNOSIS — E11.9 TYPE 2 DIABETES MELLITUS WITHOUT COMPLICATION, UNSPECIFIED WHETHER LONG TERM INSULIN USE: ICD-10-CM

## 2024-04-05 DIAGNOSIS — E89.0 POSTOPERATIVE HYPOTHYROIDISM: ICD-10-CM

## 2024-04-05 DIAGNOSIS — E11.65 UNCONTROLLED TYPE 2 DIABETES MELLITUS WITH HYPERGLYCEMIA, WITHOUT LONG-TERM CURRENT USE OF INSULIN: ICD-10-CM

## 2024-04-05 DIAGNOSIS — Z85.850 HISTORY OF PAPILLARY ADENOCARCINOMA OF THYROID: ICD-10-CM

## 2024-04-05 RX ORDER — ATORVASTATIN CALCIUM 80 MG/1
80 TABLET, FILM COATED ORAL DAILY
Qty: 90 TABLET | Refills: 3 | Status: SHIPPED | OUTPATIENT
Start: 2024-04-05 | End: 2025-04-05

## 2024-04-05 RX ORDER — LEVOTHYROXINE SODIUM 112 UG/1
112 TABLET ORAL
Qty: 30 TABLET | Refills: 11 | Status: SHIPPED | OUTPATIENT
Start: 2024-04-05 | End: 2025-04-05

## 2024-04-05 RX ORDER — BENAZEPRIL HYDROCHLORIDE 20 MG/1
TABLET ORAL
Qty: 30 TABLET | Refills: 3 | Status: SHIPPED | OUTPATIENT
Start: 2024-04-05

## 2024-04-08 ENCOUNTER — PATIENT MESSAGE (OUTPATIENT)
Dept: ADMINISTRATIVE | Facility: HOSPITAL | Age: 64
End: 2024-04-08
Payer: COMMERCIAL

## 2024-04-08 DIAGNOSIS — E11.65 UNCONTROLLED TYPE 2 DIABETES MELLITUS WITH HYPERGLYCEMIA, WITHOUT LONG-TERM CURRENT USE OF INSULIN: Primary | ICD-10-CM

## 2024-04-08 DIAGNOSIS — E78.2 MIXED HYPERLIPIDEMIA: ICD-10-CM

## 2024-04-08 RX ORDER — EVOLOCUMAB 140 MG/ML
140 INJECTION, SOLUTION SUBCUTANEOUS
Qty: 6 ML | Refills: 3 | Status: ACTIVE | OUTPATIENT
Start: 2024-04-08 | End: 2024-06-13

## 2024-06-10 ENCOUNTER — PATIENT MESSAGE (OUTPATIENT)
Dept: INTERNAL MEDICINE | Facility: CLINIC | Age: 64
End: 2024-06-10
Payer: COMMERCIAL

## 2024-07-19 DIAGNOSIS — Z85.850 HISTORY OF PAPILLARY ADENOCARCINOMA OF THYROID: ICD-10-CM

## 2024-07-22 DIAGNOSIS — Z85.850 HISTORY OF PAPILLARY ADENOCARCINOMA OF THYROID: ICD-10-CM

## 2024-07-22 DIAGNOSIS — E89.0 POSTOPERATIVE HYPOTHYROIDISM: ICD-10-CM

## 2024-07-22 RX ORDER — LEVOTHYROXINE SODIUM 112 UG/1
112 TABLET ORAL
Qty: 30 TABLET | Refills: 11 | Status: SHIPPED | OUTPATIENT
Start: 2024-07-22 | End: 2025-07-22

## 2024-07-22 RX ORDER — LEVOTHYROXINE SODIUM 125 UG/1
125 TABLET ORAL
Qty: 90 TABLET | Refills: 3 | Status: SHIPPED | OUTPATIENT
Start: 2024-07-22 | End: 2024-07-22

## 2024-08-12 DIAGNOSIS — E11.65 UNCONTROLLED TYPE 2 DIABETES MELLITUS WITH HYPERGLYCEMIA, WITHOUT LONG-TERM CURRENT USE OF INSULIN: ICD-10-CM

## 2024-08-12 RX ORDER — BENAZEPRIL HYDROCHLORIDE 20 MG/1
TABLET ORAL
Qty: 90 TABLET | Refills: 1 | Status: SHIPPED | OUTPATIENT
Start: 2024-08-12

## 2024-08-21 DIAGNOSIS — E11.9 TYPE 2 DIABETES MELLITUS WITHOUT COMPLICATION: ICD-10-CM

## 2024-08-26 DIAGNOSIS — E78.2 MIXED HYPERLIPIDEMIA: ICD-10-CM

## 2024-08-26 RX ORDER — EZETIMIBE 10 MG/1
10 TABLET ORAL
Qty: 90 TABLET | Refills: 1 | Status: SHIPPED | OUTPATIENT
Start: 2024-08-26

## 2024-10-02 DIAGNOSIS — Z12.31 OTHER SCREENING MAMMOGRAM: ICD-10-CM

## 2024-10-02 DIAGNOSIS — Z78.0 MENOPAUSE: ICD-10-CM

## 2024-12-17 ENCOUNTER — PATIENT MESSAGE (OUTPATIENT)
Dept: ADMINISTRATIVE | Facility: HOSPITAL | Age: 64
End: 2024-12-17
Payer: COMMERCIAL

## 2025-01-13 ENCOUNTER — TELEPHONE (OUTPATIENT)
Dept: ENDOCRINOLOGY | Facility: CLINIC | Age: 65
End: 2025-01-13
Payer: MEDICARE

## 2025-01-16 DIAGNOSIS — I10 ESSENTIAL HYPERTENSION: ICD-10-CM

## 2025-01-16 DIAGNOSIS — E11.9 TYPE 2 DIABETES MELLITUS WITHOUT COMPLICATION: ICD-10-CM

## 2025-01-17 ENCOUNTER — TELEPHONE (OUTPATIENT)
Dept: ENDOCRINOLOGY | Facility: CLINIC | Age: 65
End: 2025-01-17
Payer: MEDICARE

## 2025-01-27 ENCOUNTER — TELEPHONE (OUTPATIENT)
Dept: ENDOCRINOLOGY | Facility: CLINIC | Age: 65
End: 2025-01-27
Payer: MEDICARE

## 2025-01-27 NOTE — PROGRESS NOTES
Subjective:      Chief Complaint: post surgical hypothyroidism and diabetes mellitus type 2    HPI: Rand Kaye is a 64 y.o. female who is here for a follow up evaluation of papillary thyroid cancer and diabetes mellitus type 2. Frequently lost to follow up.  Previous patient of Dr. Whitten and myself. Last visit in 1/3/2024    Lost to follow up.  She has been taking care of her granddaughter who has been having seizures.     At her last visit we decreased her levothyroxine to 112 mcg daily.     Regarding her history of thyroid cancer:  -She had thyroidectomy in 2012 for multi-nodular goiter with compressive symptoms. Incidentally found to have a 1 cm papillary thyroid cancer in left lobe after surgery.   She received 30 mCi iodine after surgery, but did not show for WBS.      Latest Reference Range & Units 01/28/23 09:16 03/09/23 15:04   Thyroglobulin Interpretation  SEE BELOW SEE BELOW   Thyroglobulin Antibody Screen <1.8 IU/mL <1.8 <1.8   Thyroglobulin, Tumor Marker ng/mL 14 (H) 2.5 (H)   (H): Data is abnormally high    Lab Results   Component Value Date    TSH 0.049 (L) 12/19/2023    FREET4 1.16 12/19/2023     Ultrasound Neck 4/2023  FINDINGS:  THYROID GLAND has been surgically removed.   Sonographic examination of neck compartments II through V was carried out.  No pathologic lymphadenopathy identified.   There is a 0.4 x 0.6 x 0.4 cm hypoechoic focus in the right inferior thyroid bed.  There is no vascularity present, borders are well defined.  No microcalcification's are seen. This focus measured 0.5 x 0.5 x 0.4 cm on previous ultrasound.   There is a 1.5 x 1.0 x 0.93 cm hypoechoic focus in the left middle thyroid bed, likely residual thyroid tissue.  Vascularity is peripheral, borders are well defined.  No microcalcification's are seen.  This focus measured 1.5 x 0.8 x 0.4 cm on previous ultrasound.   Impression:   Status post thyroidectomy.   No sonographic evidence of malignancy.   Recommend to  continue correlation with thyroglobulin levels and repeat neck ultrasound in 1 year to follow the 0.4 cm hypoechoic focus in the right inferior thyroid bed and the 1.5 cm hypoechoic focus in the left middle thyroid bed.    CT neck   9/2018  1.2 cm enhancing structure in the left thyroid resection bed.  Findings are nonspecific and may represent residual thyroid tissue or vascular structure, however in this patient with a history of papillary thyroid carcinoma continued surveillance is recommended.     Pathology      Denies difficulty breathing or swallowing  Denies swollen lymph nodes in neck.    Now regarding Post-Surgical Hypothyroidism  -Patient presents for evaluation of thyroid function. She had thyroidectomy in 2012 for multi-nodular goiter with compressive symptoms. Incidentally found to have a 1 cm papillary thyroid cancer in left lobe after surgery. She received 30 mCi iodine after surgery, but did not show for WBS.     -Current medication is levothyroxine 112 mcg daily (we called pharmacy to verify)  She is taking on empty stomach and waiting 30 minutes before eating, drinking, or taking other medications.   Not missing doses   -Current symptoms:         No   Yes    [x]    []   Weight gain    [x]    []   Fatigue    [x]    []   Constipation    [x]    []   Hair loss    [x]    []   Brittle nails    [x]    []   Mental fog    [x]    []   Cold intolerance    [x]    []   Memory impair    [x]    []   Muscle weakness    [x]    []   Periorbital edema    [x]    []   Lithium or Amiodarone use    [x]    []   Recent severe illness      Denies palpitations or tremors     Lab Results   Component Value Date    TSH 0.049 (L) 12/19/2023    TSH 0.072 (L) 08/09/2023    TSH 0.109 (L) 06/12/2023    FREET4 1.16 12/19/2023    FREET4 1.02 08/09/2023    FREET4 1.20 06/12/2023    THYROPEROXID < 6.0 01/23/2012     Now Regarding diabetes:  -Patient was initially diagnosed with Type 2 diabetes mellitus in 2009  Denies DKA  -Diabetic  complications present  She needs eye exam; needs appt  Neuropathy :tolerable  Nephropathy :+  CAD: denies    Diabetes History in Family  Father had Type 2 Diabetes Mellitus    -Denies history of pancreatitis, medullary thyroid cancer, recurrent UTI or recurrent fungal infection     Current diabetic medications include:   Metformin  mg twice daily (tries to increase and has severe diarrhea)  Mounjaro 7.5 mg weekly     Has missed doses of mounjaro when she was busy    Other medications tried  Ozempic prescribed but did not start  Does not remember being on insulin  States she is  and cannot start insulin  Glimepiride     Blood Sugar Range   She is not checking blood sugars  Had ron but not covered  No blood sugars >180    Hypoglycemic Episodes:  Denies and denies s/s of hypoglycemia    Diet  Meals are: She feels she is following diabetic diet  She has changed her diet since her first visit with us. Has felt satiety effects from Mounjaro  Breakfast: cornflakes or oatmeal; tuna with crackers   Lunch: beans and rice or spaghetti   Dinner: skipped or same as lunch  Eating 3 meals per day  Snacks: protein packs   Drinking diet coke, powerade, water and lemon tea     Does patient count carbohydrates? no    Exercise: She is doing [employer] videos for exercise   Walks, [employer]    Diabetes education on 2/23/2023; politely declines     Screening for Complications:    Dyslipidemia   Statin: Taking Atorvastatin 80 mg daily and zetia 10 mg daily  Nephropathy  ACE/ARB: Taking Benazepril 20 mg daily    Lab Results   Component Value Date    HGBA1C 5.9 (H) 12/19/2023    HGBA1C 6.3 (H) 04/21/2023    HGBA1C 10.4 (H) 01/28/2023     Diabetes Management Status  Screening or Prevention Patient's value Goal Complete/Controlled?   HgA1C Testing and Control   Lab Results   Component Value Date    HGBA1C 5.9 (H) 12/19/2023      Annually/Less than 8% No   Lipid profile : 12/19/2023 Annually Yes   LDL control Lab Results    Component Value Date    LDLCALC 80.4 12/19/2023    Annually/Less than 100 mg/dl  No   Nephropathy screening Lab Results   Component Value Date    LABMICR 680.0 01/28/2023     Lab Results   Component Value Date    PROTEINUA Negative 04/19/2012    Annually Yes   Blood pressure BP Readings from Last 1 Encounters:   01/29/25 132/78    Less than 140/90 No   Dilated retinal exam : 09/24/2018 Annually No   Foot exam   : 01/26/2023 Annually No     With regards to the hypercalcemia,    Lab Results   Component Value Date    PTH 53 04/19/2012    CALCIUM 10.3 12/19/2023    CAION 1.11 03/27/2012    PHOS 3.1 01/18/2019     Lab Results   Component Value Date    ALBUMIN 3.9 12/19/2023      Latest Reference Range & Units 01/23/12 11:28 09/10/16 09:59 01/28/23 09:16   Vit D, 25-Hydroxy 30 - 96 ng/mL 18 (L) 43 52   (L): Data is abnormally low        Daily intake of calcium is: MVI  Taking vitamin D: OTC 1000 IU daily- instructed to to stop last time but she is still taking      Latest Reference Range & Units 12/19/23 09:20   Vit D, 25-Hydroxy 30 - 96 ng/mL 96     Denies constipation, depression, polyuria,   + muscle aches or pains in knees.    Last bmd was: denies but ordering today   Denies fractures, height loss or kidney stones  Denies history of renal disease.   There is no family history of hyperparathyroidism or calcium problems as far as she knows.  Denies HCTZ., lithium,   + chlorthiadone use.    5 indications for surgery:  CKD  <50 yoa  + kidney stones  Serum Ca > 11.5  Urine Ca >400   Osteoporosis    With regards to dyslipidemia,     She is on atorvastatin 80 mg daily and zetia 10 mg daily  We tried to add repatha last time but she did not hear anything   Latest Reference Range & Units 08/03/18 16:20 02/18/22 14:41 01/28/23 09:16 12/19/23 09:20   Cholesterol Total 120 - 199 mg/dL 222 (H) 250 (H) 180 160   HDL 40 - 75 mg/dL 66 73 70 66   HDL/Cholesterol Ratio 20.0 - 50.0 % 29.7 29.2 38.9 41.3   Non-HDL Cholesterol mg/dL 156  177 110 94   Total Cholesterol/HDL Ratio 2.0 - 5.0  3.4 3.4 2.6 2.4   Triglycerides 30 - 150 mg/dL 191 (H) 157 (H) 102 68   LDL Cholesterol 63.0 - 159.0 mg/dL 117.8 145.6 89.6 80.4   (H): Data is abnormally high    Review of Systems   Constitutional:  Negative for fatigue and unexpected weight change.   Eyes:  Positive for visual disturbance.   Endocrine: Negative for polydipsia, polyphagia and polyuria.      as above    Objective:     Vitals:    01/29/25 1408   BP: 132/78   Pulse: 65       BP Readings from Last 5 Encounters:   01/29/25 132/78   01/03/24 136/86   12/19/23 126/80   11/09/23 (!) 142/77   11/06/23 137/65     Wt Readings from Last 3 Encounters:   01/29/25 1408 94.8 kg (209 lb 1.7 oz)   01/03/24 1347 89.8 kg (198 lb 1.3 oz)   12/22/23 0817 88.5 kg (195 lb)     Physical Exam  Constitutional:       Appearance: She is obese.   Neck:      Comments: No thyroid tissue  Pulmonary:      Effort: Pulmonary effort is normal.   Neurological:      Mental Status: She is alert.     Diabetes Foot Exam:   No sores or lesions to bilateral feet  Shoes appropriate  DM foot exam done in Jan 2023    Wt Readings from Last 10 Encounters:   01/29/25 1408 94.8 kg (209 lb 1.7 oz)   01/03/24 1347 89.8 kg (198 lb 1.3 oz)   12/22/23 0817 88.5 kg (195 lb)   12/19/23 0820 88.9 kg (195 lb 15.8 oz)   11/17/23 0959 87.1 kg (192 lb 0.3 oz)   11/09/23 1149 87.1 kg (192 lb 0.3 oz)   11/09/23 1008 88.5 kg (195 lb)   11/06/23 1243 88.5 kg (195 lb)   09/07/23 1105 92.5 kg (204 lb)   09/05/23 1157 92.8 kg (204 lb 9.6 oz)   08/31/23 0845 91.8 kg (202 lb 7.9 oz)     Lab Results   Component Value Date    HGBA1C 5.9 (H) 12/19/2023     Lab Results   Component Value Date    CHOL 160 12/19/2023    HDL 66 12/19/2023    LDLCALC 80.4 12/19/2023    TRIG 68 12/19/2023    CHOLHDL 41.3 12/19/2023     Lab Results   Component Value Date     12/19/2023    K 3.6 12/19/2023     12/19/2023    CO2 26 12/19/2023     12/19/2023    BUN 13 12/19/2023     CREATININE 0.8 12/19/2023    CALCIUM 10.3 12/19/2023    PROT 7.7 12/19/2023    ALBUMIN 3.9 12/19/2023    BILITOT 0.3 12/19/2023    ALKPHOS 61 12/19/2023    AST 17 12/19/2023    ALT 20 12/19/2023    ANIONGAP 8 12/19/2023    ESTGFRAFRICA >60.0 02/18/2022    EGFRNONAA >60.0 02/18/2022    TSH 0.049 (L) 12/19/2023      Lab Results   Component Value Date    MICALBCREAT 309.1 (H) 01/28/2023     Assessment/Plan:     1. Controlled type 2 diabetes mellitus without complication, without long-term current use of insulin  Comprehensive Metabolic Panel    Hemoglobin A1C    Lipid Panel    Microalbumin/Creatinine Ratio, Urine      2. Postoperative hypothyroidism  TSH    Thyroglobulin    US Soft Tissue Head Neck      3. History of papillary adenocarcinoma of thyroid  TSH    Thyroglobulin    US Soft Tissue Head Neck      4. Hypercalcemia        5. Mixed hyperlipidemia        6. Essential hypertension        7. Class 1 obesity due to excess calories with serious comorbidity and body mass index (BMI) of 34.0 to 34.9 in adult        8. Type II diabetes mellitus with neurological manifestations        9. Vitamin D deficiency disease  Vitamin D      10. Uncontrolled type 2 diabetes mellitus with hyperglycemia, without long-term current use of insulin  metFORMIN (GLUCOPHAGE-XR) 500 MG ER 24hr tablet    tirzepatide 10 mg/0.5 mL PnIj    DISCONTINUED: metFORMIN (GLUCOPHAGE-XR) 500 MG ER 24hr tablet      11. Postmenopausal  DEXA Bone Density Axial Skeleton 1 or more Site W TBS XPD        Controlled type 2 diabetes mellitus, without long-term current use of insulin  -- Reviewed goals of therapy are to get the best control we can without hypoglycemia  No logs or labs for review  Check A1c   Consider CGMS     Medication Changes   Continue Metformin 500 mg twice daily   Increase Mounjaro to 10 mg weekly     -- Advised frequent self blood glucose monitoring.  Patient encouraged to document glucose results and bring them to every clinic visit     -- Hypoglycemia precautions discussed. Instructed on precautions before driving.    -- Call for Bg repeatedly < 90 or > 180.   -- Close adherence to lifestyle changes recommended.   -- Periodic follow ups for eye evaluations, foot care and dental care suggested.    Postoperative hypothyroidism  - TSH target a goal of 0.5-2  Check TSH  Continue Levothyroxine 112 mcg daily        History of papillary adenocarcinoma of thyroid  -Stage 1 papillary thyroid cancer s/p thyroidectomy and radioiodine in 2012   -History of elevated thyroglobulin and ultrasound with possible residual thyroid tissue but no lymphadenopathy   TG trending downward and stable.   Repeat TG   US in 2023 stable 0.4 cm in right bed and 1.5 cm in left bed  Lost to follow up     Repeat US      Hypercalcemia              On chlorthalidone               Stay hydrated              Watch calcium level    STOP VITAMIN D    Mixed hyperlipidemia  LDL above goal of less than 70  Continue atorvastatin 80 mg daily and zetia 10 mg daily  Check LP      Essential hypertension  On ACEI  BP goals discussed today     Obesity  -- encouraged dietary and lifestyle modifications   -- emphasized weight loss goals   Increase Mounjaro as above     Type II diabetes mellitus with neurological manifestations  Optimize glucose control    Postmenopausal  Check bone density    Visit today included increased complexity associated with the care of episodic problems type 2 diabetes, postoperative hypothyroidism, papillary thyroid cancer, hyperlipidemia, obesity, hypertension, hypercalcemia addressed and managing longitudinal care of the patient due to serious managed problems type 2 diabetes, postoperative hypothyroidism, papillary thyroid cancer, hyperlipidemia, obesity, hypertension, hypercalcemia     Follow up in about 5 months (around 6/29/2025).

## 2025-01-28 DIAGNOSIS — E11.65 UNCONTROLLED TYPE 2 DIABETES MELLITUS WITH HYPERGLYCEMIA, WITHOUT LONG-TERM CURRENT USE OF INSULIN: Primary | ICD-10-CM

## 2025-01-28 DIAGNOSIS — E89.0 POSTOPERATIVE HYPOTHYROIDISM: ICD-10-CM

## 2025-01-28 PROBLEM — E11.49 TYPE II DIABETES MELLITUS WITH NEUROLOGICAL MANIFESTATIONS: Status: ACTIVE | Noted: 2025-01-28

## 2025-01-28 NOTE — ASSESSMENT & PLAN NOTE
LDL above goal of less than 70  Continue atorvastatin 80 mg daily and zetia 10 mg daily  Check LP

## 2025-01-28 NOTE — ASSESSMENT & PLAN NOTE
-- encouraged dietary and lifestyle modifications   -- emphasized weight loss goals   Increase Mounjaro as above

## 2025-01-28 NOTE — ASSESSMENT & PLAN NOTE
-- Reviewed goals of therapy are to get the best control we can without hypoglycemia  No logs or labs for review  Check A1c   Consider CGMS     Medication Changes   Continue Metformin 500 mg twice daily   Increase Mounjaro to 10 mg weekly     -- Advised frequent self blood glucose monitoring.  Patient encouraged to document glucose results and bring them to every clinic visit    -- Hypoglycemia precautions discussed. Instructed on precautions before driving.    -- Call for Bg repeatedly < 90 or > 180.   -- Close adherence to lifestyle changes recommended.   -- Periodic follow ups for eye evaluations, foot care and dental care suggested.

## 2025-01-28 NOTE — ASSESSMENT & PLAN NOTE
-Stage 1 papillary thyroid cancer s/p thyroidectomy and radioiodine in 2012   -History of elevated thyroglobulin and ultrasound with possible residual thyroid tissue but no lymphadenopathy   TG trending downward and stable.   Repeat TG   US in 2023 stable 0.4 cm in right bed and 1.5 cm in left bed  Lost to follow up     Repeat US

## 2025-01-29 ENCOUNTER — OFFICE VISIT (OUTPATIENT)
Dept: ENDOCRINOLOGY | Facility: CLINIC | Age: 65
End: 2025-01-29
Payer: MEDICARE

## 2025-01-29 VITALS
OXYGEN SATURATION: 98 % | SYSTOLIC BLOOD PRESSURE: 132 MMHG | WEIGHT: 209.13 LBS | HEIGHT: 71 IN | HEART RATE: 65 BPM | DIASTOLIC BLOOD PRESSURE: 78 MMHG | BODY MASS INDEX: 29.28 KG/M2

## 2025-01-29 DIAGNOSIS — E83.52 HYPERCALCEMIA: ICD-10-CM

## 2025-01-29 DIAGNOSIS — Z85.850 HISTORY OF PAPILLARY ADENOCARCINOMA OF THYROID: ICD-10-CM

## 2025-01-29 DIAGNOSIS — E66.09 CLASS 1 OBESITY DUE TO EXCESS CALORIES WITH SERIOUS COMORBIDITY AND BODY MASS INDEX (BMI) OF 34.0 TO 34.9 IN ADULT: ICD-10-CM

## 2025-01-29 DIAGNOSIS — I10 ESSENTIAL HYPERTENSION: ICD-10-CM

## 2025-01-29 DIAGNOSIS — E11.9 CONTROLLED TYPE 2 DIABETES MELLITUS WITHOUT COMPLICATION, WITHOUT LONG-TERM CURRENT USE OF INSULIN: Primary | ICD-10-CM

## 2025-01-29 DIAGNOSIS — E55.9 VITAMIN D DEFICIENCY DISEASE: ICD-10-CM

## 2025-01-29 DIAGNOSIS — E66.811 CLASS 1 OBESITY DUE TO EXCESS CALORIES WITH SERIOUS COMORBIDITY AND BODY MASS INDEX (BMI) OF 34.0 TO 34.9 IN ADULT: ICD-10-CM

## 2025-01-29 DIAGNOSIS — E89.0 POSTOPERATIVE HYPOTHYROIDISM: ICD-10-CM

## 2025-01-29 DIAGNOSIS — Z78.0 POSTMENOPAUSAL: ICD-10-CM

## 2025-01-29 DIAGNOSIS — E11.49 TYPE II DIABETES MELLITUS WITH NEUROLOGICAL MANIFESTATIONS: ICD-10-CM

## 2025-01-29 DIAGNOSIS — E11.65 UNCONTROLLED TYPE 2 DIABETES MELLITUS WITH HYPERGLYCEMIA, WITHOUT LONG-TERM CURRENT USE OF INSULIN: Chronic | ICD-10-CM

## 2025-01-29 DIAGNOSIS — E78.2 MIXED HYPERLIPIDEMIA: ICD-10-CM

## 2025-01-29 PROCEDURE — 3288F FALL RISK ASSESSMENT DOCD: CPT | Mod: CPTII,S$GLB,, | Performed by: NURSE PRACTITIONER

## 2025-01-29 PROCEDURE — 99214 OFFICE O/P EST MOD 30 MIN: CPT | Mod: S$GLB,,, | Performed by: NURSE PRACTITIONER

## 2025-01-29 PROCEDURE — 99999 PR PBB SHADOW E&M-EST. PATIENT-LVL V: CPT | Mod: PBBFAC,,, | Performed by: NURSE PRACTITIONER

## 2025-01-29 PROCEDURE — 1160F RVW MEDS BY RX/DR IN RCRD: CPT | Mod: CPTII,S$GLB,, | Performed by: NURSE PRACTITIONER

## 2025-01-29 PROCEDURE — 3075F SYST BP GE 130 - 139MM HG: CPT | Mod: CPTII,S$GLB,, | Performed by: NURSE PRACTITIONER

## 2025-01-29 PROCEDURE — 3008F BODY MASS INDEX DOCD: CPT | Mod: CPTII,S$GLB,, | Performed by: NURSE PRACTITIONER

## 2025-01-29 PROCEDURE — G2211 COMPLEX E/M VISIT ADD ON: HCPCS | Mod: S$GLB,,, | Performed by: NURSE PRACTITIONER

## 2025-01-29 PROCEDURE — 4010F ACE/ARB THERAPY RXD/TAKEN: CPT | Mod: CPTII,S$GLB,, | Performed by: NURSE PRACTITIONER

## 2025-01-29 PROCEDURE — 1101F PT FALLS ASSESS-DOCD LE1/YR: CPT | Mod: CPTII,S$GLB,, | Performed by: NURSE PRACTITIONER

## 2025-01-29 PROCEDURE — 1159F MED LIST DOCD IN RCRD: CPT | Mod: CPTII,S$GLB,, | Performed by: NURSE PRACTITIONER

## 2025-01-29 PROCEDURE — 1126F AMNT PAIN NOTED NONE PRSNT: CPT | Mod: CPTII,S$GLB,, | Performed by: NURSE PRACTITIONER

## 2025-01-29 PROCEDURE — 3078F DIAST BP <80 MM HG: CPT | Mod: CPTII,S$GLB,, | Performed by: NURSE PRACTITIONER

## 2025-01-29 RX ORDER — METFORMIN HYDROCHLORIDE 500 MG/1
500 TABLET, EXTENDED RELEASE ORAL 2 TIMES DAILY
Qty: 180 TABLET | Refills: 3 | Status: SHIPPED | OUTPATIENT
Start: 2025-01-29 | End: 2026-01-24

## 2025-01-29 RX ORDER — METFORMIN HYDROCHLORIDE 500 MG/1
1000 TABLET, EXTENDED RELEASE ORAL 2 TIMES DAILY
Qty: 360 TABLET | Refills: 3 | Status: SHIPPED | OUTPATIENT
Start: 2025-01-29 | End: 2025-01-29

## 2025-01-29 NOTE — PATIENT INSTRUCTIONS
Thyroid Cancer  Check thyroid US and TG level    Thyroid Hormone    Check TSH    Continue Levothyroxine 112 mcg daily    Check labs in 8 weeks     Diabetes  No logs or labs for review  Check A1c   Consider CGMS     Medication Changes   Continue Metformin 500 mg twice daily   Increase Mounjaro to 10 mg weekly     Borderline High Calcium              On chlorthalidone               Stay hydrated              Watch calcium level                  STOP VITAMIN D     Cholesterol               LDL above goal of less than 70              Continue atorvastatin 80 mg daily and zetia 10 mg daily              Check LP

## 2025-02-19 ENCOUNTER — RESULTS FOLLOW-UP (OUTPATIENT)
Dept: ENDOCRINOLOGY | Facility: CLINIC | Age: 65
End: 2025-02-19
Payer: MEDICARE

## 2025-02-19 ENCOUNTER — HOSPITAL ENCOUNTER (OUTPATIENT)
Dept: ENDOCRINOLOGY | Facility: CLINIC | Age: 65
Discharge: HOME | End: 2025-02-19
Attending: NURSE PRACTITIONER
Payer: MEDICARE

## 2025-02-19 ENCOUNTER — HOSPITAL ENCOUNTER (OUTPATIENT)
Dept: RADIOLOGY | Facility: CLINIC | Age: 65
Discharge: HOME | End: 2025-02-19
Attending: NURSE PRACTITIONER
Payer: MEDICARE

## 2025-02-19 DIAGNOSIS — Z78.0 POSTMENOPAUSAL: ICD-10-CM

## 2025-02-19 DIAGNOSIS — Z85.850 HISTORY OF PAPILLARY ADENOCARCINOMA OF THYROID: ICD-10-CM

## 2025-02-19 DIAGNOSIS — E89.0 POSTOPERATIVE HYPOTHYROIDISM: ICD-10-CM

## 2025-02-19 DIAGNOSIS — E89.0 POSTOPERATIVE HYPOTHYROIDISM: Primary | ICD-10-CM

## 2025-02-19 PROCEDURE — 77080 DXA BONE DENSITY AXIAL: CPT | Mod: TC

## 2025-02-19 RX ORDER — LEVOTHYROXINE SODIUM 100 UG/1
100 TABLET ORAL
Qty: 30 TABLET | Refills: 11 | Status: SHIPPED | OUTPATIENT
Start: 2025-02-19 | End: 2026-02-19

## 2025-04-01 DIAGNOSIS — E11.65 UNCONTROLLED TYPE 2 DIABETES MELLITUS WITH HYPERGLYCEMIA, WITHOUT LONG-TERM CURRENT USE OF INSULIN: ICD-10-CM

## 2025-04-01 DIAGNOSIS — E78.2 MIXED HYPERLIPIDEMIA: ICD-10-CM

## 2025-04-02 RX ORDER — BENAZEPRIL HYDROCHLORIDE 20 MG/1
20 TABLET ORAL
Qty: 90 TABLET | Refills: 1 | Status: SHIPPED | OUTPATIENT
Start: 2025-04-02

## 2025-04-02 RX ORDER — EZETIMIBE 10 MG/1
10 TABLET ORAL
Qty: 90 TABLET | Refills: 1 | Status: SHIPPED | OUTPATIENT
Start: 2025-04-02

## 2025-04-02 RX ORDER — ATORVASTATIN CALCIUM 80 MG/1
80 TABLET, FILM COATED ORAL DAILY
Qty: 90 TABLET | Refills: 3 | Status: SHIPPED | OUTPATIENT
Start: 2025-04-02 | End: 2026-04-02

## 2025-04-30 DIAGNOSIS — E11.9 TYPE 2 DIABETES MELLITUS WITHOUT COMPLICATION, UNSPECIFIED WHETHER LONG TERM INSULIN USE: ICD-10-CM
